# Patient Record
Sex: MALE | Race: WHITE | NOT HISPANIC OR LATINO | Employment: PART TIME | URBAN - METROPOLITAN AREA
[De-identification: names, ages, dates, MRNs, and addresses within clinical notes are randomized per-mention and may not be internally consistent; named-entity substitution may affect disease eponyms.]

---

## 2023-01-01 ENCOUNTER — HOSPITAL ENCOUNTER (INPATIENT)
Facility: HOSPITAL | Age: 63
LOS: 3 days | Discharge: HOME | DRG: 322 | End: 2023-12-08
Attending: EMERGENCY MEDICINE | Admitting: HOSPITALIST

## 2023-01-01 ENCOUNTER — TELEPHONE (OUTPATIENT)
Dept: GASTROENTEROLOGY | Facility: CLINIC | Age: 63
End: 2023-01-01

## 2023-01-01 ENCOUNTER — OFFICE VISIT (OUTPATIENT)
Dept: CARDIOLOGY | Facility: CLINIC | Age: 63
End: 2023-01-01

## 2023-01-01 ENCOUNTER — ANESTHESIA EVENT (OUTPATIENT)
Dept: GASTROENTEROLOGY | Facility: HOSPITAL | Age: 63
DRG: 322 | End: 2023-01-01

## 2023-01-01 ENCOUNTER — APPOINTMENT (OUTPATIENT)
Dept: CARDIOLOGY | Facility: HOSPITAL | Age: 63
DRG: 322 | End: 2023-01-01

## 2023-01-01 ENCOUNTER — ANESTHESIA (OUTPATIENT)
Dept: GASTROENTEROLOGY | Facility: HOSPITAL | Age: 63
End: 2023-01-01

## 2023-01-01 ENCOUNTER — ANESTHESIA EVENT (OUTPATIENT)
Dept: GASTROENTEROLOGY | Facility: HOSPITAL | Age: 63
End: 2023-01-01

## 2023-01-01 ENCOUNTER — APPOINTMENT (OUTPATIENT)
Dept: GASTROENTEROLOGY | Facility: HOSPITAL | Age: 63
DRG: 322 | End: 2023-01-01

## 2023-01-01 ENCOUNTER — APPOINTMENT (OUTPATIENT)
Dept: RADIOLOGY | Facility: HOSPITAL | Age: 63
DRG: 322 | End: 2023-01-01

## 2023-01-01 ENCOUNTER — LAB (OUTPATIENT)
Dept: LAB | Facility: LAB | Age: 63
End: 2023-01-01

## 2023-01-01 ENCOUNTER — ANESTHESIA (OUTPATIENT)
Dept: GASTROENTEROLOGY | Facility: HOSPITAL | Age: 63
DRG: 322 | End: 2023-01-01

## 2023-01-01 VITALS
TEMPERATURE: 98.8 F | DIASTOLIC BLOOD PRESSURE: 68 MMHG | BODY MASS INDEX: 26.48 KG/M2 | HEART RATE: 108 BPM | SYSTOLIC BLOOD PRESSURE: 137 MMHG | WEIGHT: 184.97 LBS | RESPIRATION RATE: 18 BRPM | OXYGEN SATURATION: 94 % | HEIGHT: 70 IN

## 2023-01-01 VITALS
HEART RATE: 68 BPM | BODY MASS INDEX: 26.53 KG/M2 | DIASTOLIC BLOOD PRESSURE: 62 MMHG | WEIGHT: 185.3 LBS | SYSTOLIC BLOOD PRESSURE: 116 MMHG | HEIGHT: 70 IN

## 2023-01-01 DIAGNOSIS — R42 DIZZINESS: ICD-10-CM

## 2023-01-01 DIAGNOSIS — R09.89 CAROTID BRUIT, UNSPECIFIED LATERALITY: ICD-10-CM

## 2023-01-01 DIAGNOSIS — Z95.5 PRESENCE OF STENT IN CORONARY ARTERY: ICD-10-CM

## 2023-01-01 DIAGNOSIS — Z98.61 CAD S/P PERCUTANEOUS CORONARY ANGIOPLASTY: ICD-10-CM

## 2023-01-01 DIAGNOSIS — Z86.73 HISTORY OF CVA (CEREBROVASCULAR ACCIDENT): ICD-10-CM

## 2023-01-01 DIAGNOSIS — F17.200 CURRENT SMOKER: ICD-10-CM

## 2023-01-01 DIAGNOSIS — I10 ESSENTIAL HYPERTENSION: ICD-10-CM

## 2023-01-01 DIAGNOSIS — I21.4 NSTEMI (NON-ST ELEVATED MYOCARDIAL INFARCTION) (MULTI): ICD-10-CM

## 2023-01-01 DIAGNOSIS — I65.29 CAROTID ATHEROSCLEROSIS, UNSPECIFIED LATERALITY: ICD-10-CM

## 2023-01-01 DIAGNOSIS — I25.10 CAD S/P PERCUTANEOUS CORONARY ANGIOPLASTY: ICD-10-CM

## 2023-01-01 DIAGNOSIS — D50.9 IRON DEFICIENCY ANEMIA, UNSPECIFIED IRON DEFICIENCY ANEMIA TYPE: ICD-10-CM

## 2023-01-01 DIAGNOSIS — D64.9 ANEMIA, UNSPECIFIED TYPE: ICD-10-CM

## 2023-01-01 DIAGNOSIS — N18.31 STAGE 3A CHRONIC KIDNEY DISEASE (MULTI): ICD-10-CM

## 2023-01-01 DIAGNOSIS — I10 ESSENTIAL (PRIMARY) HYPERTENSION: ICD-10-CM

## 2023-01-01 DIAGNOSIS — E78.2 MIXED HYPERLIPIDEMIA: ICD-10-CM

## 2023-01-01 DIAGNOSIS — E78.5 DYSLIPIDEMIA: ICD-10-CM

## 2023-01-01 DIAGNOSIS — I21.4 NSTEMI (NON-ST ELEVATED MYOCARDIAL INFARCTION) (MULTI): Primary | ICD-10-CM

## 2023-01-01 DIAGNOSIS — I25.2 OLD MYOCARDIAL INFARCTION: ICD-10-CM

## 2023-01-01 LAB
ALBUMIN SERPL BCP-MCNC: 4.4 G/DL (ref 3.4–5)
ALP SERPL-CCNC: 76 U/L (ref 33–136)
ALT SERPL W P-5'-P-CCNC: 23 U/L (ref 10–52)
ANION GAP SERPL CALC-SCNC: 11 MMOL/L (ref 10–20)
ANION GAP SERPL CALC-SCNC: 12 MMOL/L (ref 10–20)
ANION GAP SERPL CALC-SCNC: 13 MMOL/L (ref 10–20)
ANION GAP SERPL CALC-SCNC: 13 MMOL/L (ref 10–20)
ANION GAP SERPL CALC-SCNC: 8 MMOL/L (ref 10–20)
AORTIC VALVE MEAN GRADIENT: 9
AORTIC VALVE PEAK VELOCITY: 1.97
APTT PPP: 35 SECONDS (ref 27–38)
AST SERPL W P-5'-P-CCNC: 17 U/L (ref 9–39)
ATRIAL RATE: 75 BPM
ATRIAL RATE: 83 BPM
ATRIAL RATE: 88 BPM
AV PEAK GRADIENT: 15.5
AVA (PEAK VEL): 1.9
AVA (VTI): 1.99
BASOPHILS # BLD AUTO: 0.02 X10*3/UL (ref 0–0.1)
BASOPHILS NFR BLD AUTO: 0.3 %
BILIRUB SERPL-MCNC: 0.4 MG/DL (ref 0–1.2)
BNP SERPL-MCNC: 30 PG/ML (ref 0–99)
BUN SERPL-MCNC: 14 MG/DL (ref 6–23)
BUN SERPL-MCNC: 15 MG/DL (ref 6–23)
BUN SERPL-MCNC: 15 MG/DL (ref 6–23)
BUN SERPL-MCNC: 18 MG/DL (ref 6–23)
BUN SERPL-MCNC: 20 MG/DL (ref 6–23)
CALCIUM SERPL-MCNC: 8.9 MG/DL (ref 8.6–10.3)
CALCIUM SERPL-MCNC: 9 MG/DL (ref 8.6–10.3)
CALCIUM SERPL-MCNC: 9 MG/DL (ref 8.6–10.3)
CALCIUM SERPL-MCNC: 9.2 MG/DL (ref 8.6–10.3)
CALCIUM SERPL-MCNC: 9.4 MG/DL (ref 8.6–10.3)
CARDIAC TROPONIN I PNL SERPL HS: 223 NG/L (ref 0–20)
CARDIAC TROPONIN I PNL SERPL HS: 273 NG/L (ref 0–20)
CARDIAC TROPONIN I PNL SERPL HS: 477 NG/L (ref 0–20)
CHLORIDE SERPL-SCNC: 107 MMOL/L (ref 98–107)
CHLORIDE SERPL-SCNC: 108 MMOL/L (ref 98–107)
CHLORIDE SERPL-SCNC: 110 MMOL/L (ref 98–107)
CHOLEST SERPL-MCNC: 114 MG/DL (ref 0–199)
CHOLESTEROL/HDL RATIO: 3.7
CO2 SERPL-SCNC: 21 MMOL/L (ref 21–32)
CO2 SERPL-SCNC: 21 MMOL/L (ref 21–32)
CO2 SERPL-SCNC: 22 MMOL/L (ref 21–32)
CO2 SERPL-SCNC: 22 MMOL/L (ref 21–32)
CO2 SERPL-SCNC: 24 MMOL/L (ref 21–32)
CREAT SERPL-MCNC: 1.18 MG/DL (ref 0.5–1.3)
CREAT SERPL-MCNC: 1.27 MG/DL (ref 0.5–1.3)
CREAT SERPL-MCNC: 1.32 MG/DL (ref 0.5–1.3)
CREAT SERPL-MCNC: 1.39 MG/DL (ref 0.5–1.3)
CREAT SERPL-MCNC: 1.4 MG/DL (ref 0.5–1.3)
D DIMER PPP FEU-MCNC: 442 NG/ML FEU
EJECTION FRACTION APICAL 4 CHAMBER: 64.7
EJECTION FRACTION: 63
EOSINOPHIL # BLD AUTO: 0 X10*3/UL (ref 0–0.7)
EOSINOPHIL NFR BLD AUTO: 0 %
ERYTHROCYTE [DISTWIDTH] IN BLOOD BY AUTOMATED COUNT: 16.3 % (ref 11.5–14.5)
ERYTHROCYTE [DISTWIDTH] IN BLOOD BY AUTOMATED COUNT: 16.4 % (ref 11.5–14.5)
ERYTHROCYTE [DISTWIDTH] IN BLOOD BY AUTOMATED COUNT: 16.5 % (ref 11.5–14.5)
ERYTHROCYTE [DISTWIDTH] IN BLOOD BY AUTOMATED COUNT: 22.5 % (ref 11.5–14.5)
FERRITIN SERPL-MCNC: 11 NG/ML (ref 20–300)
FOLATE SERPL-MCNC: >22.3 NG/ML
GFR SERPL CREATININE-BSD FRML MDRD: 57 ML/MIN/1.73M*2
GFR SERPL CREATININE-BSD FRML MDRD: 57 ML/MIN/1.73M*2
GFR SERPL CREATININE-BSD FRML MDRD: 61 ML/MIN/1.73M*2
GFR SERPL CREATININE-BSD FRML MDRD: 64 ML/MIN/1.73M*2
GFR SERPL CREATININE-BSD FRML MDRD: 70 ML/MIN/1.73M*2
GLUCOSE SERPL-MCNC: 116 MG/DL (ref 74–99)
GLUCOSE SERPL-MCNC: 129 MG/DL (ref 74–99)
GLUCOSE SERPL-MCNC: 83 MG/DL (ref 74–99)
GLUCOSE SERPL-MCNC: 92 MG/DL (ref 74–99)
GLUCOSE SERPL-MCNC: 94 MG/DL (ref 74–99)
HCT VFR BLD AUTO: 27.6 % (ref 41–52)
HCT VFR BLD AUTO: 30.6 % (ref 41–52)
HCT VFR BLD AUTO: 32.3 % (ref 41–52)
HCT VFR BLD AUTO: 32.6 % (ref 41–52)
HCT VFR BLD AUTO: 33.8 % (ref 41–52)
HCT VFR BLD AUTO: 44.5 % (ref 41–52)
HDLC SERPL-MCNC: 31 MG/DL
HGB BLD-MCNC: 13 G/DL (ref 13.5–17.5)
HGB BLD-MCNC: 8.2 G/DL (ref 13.5–17.5)
HGB BLD-MCNC: 9.1 G/DL (ref 13.5–17.5)
HGB BLD-MCNC: 9.5 G/DL (ref 13.5–17.5)
HGB BLD-MCNC: 9.6 G/DL (ref 13.5–17.5)
HGB BLD-MCNC: 9.8 G/DL (ref 13.5–17.5)
HOLD SPECIMEN: NORMAL
IMM GRANULOCYTES # BLD AUTO: 0.02 X10*3/UL (ref 0–0.7)
IMM GRANULOCYTES NFR BLD AUTO: 0.3 % (ref 0–0.9)
INR PPP: 1.1 (ref 0.9–1.1)
IRON SATN MFR SERPL: 4 % (ref 25–45)
IRON SERPL-MCNC: 14 UG/DL (ref 35–150)
LABORATORY COMMENT REPORT: NORMAL
LDLC SERPL CALC-MCNC: 59 MG/DL
LEFT ATRIUM VOLUME AREA LENGTH INDEX BSA: 30
LEFT VENTRICLE INTERNAL DIMENSION DIASTOLE: 4.79 (ref 3.5–6)
LEFT VENTRICULAR OUTFLOW TRACT DIAMETER: 2
LYMPHOCYTES # BLD AUTO: 0.99 X10*3/UL (ref 1.2–4.8)
LYMPHOCYTES NFR BLD AUTO: 13 %
MAGNESIUM SERPL-MCNC: 1.84 MG/DL (ref 1.6–2.4)
MAGNESIUM SERPL-MCNC: 2.01 MG/DL (ref 1.6–2.4)
MAGNESIUM SERPL-MCNC: 2.03 MG/DL (ref 1.6–2.4)
MAGNESIUM SERPL-MCNC: 2.05 MG/DL (ref 1.6–2.4)
MCH RBC QN AUTO: 22.6 PG (ref 26–34)
MCH RBC QN AUTO: 22.8 PG (ref 26–34)
MCH RBC QN AUTO: 22.9 PG (ref 26–34)
MCH RBC QN AUTO: 23 PG (ref 26–34)
MCH RBC QN AUTO: 23 PG (ref 26–34)
MCH RBC QN AUTO: 24.6 PG (ref 26–34)
MCHC RBC AUTO-ENTMCNC: 29 G/DL (ref 32–36)
MCHC RBC AUTO-ENTMCNC: 29.1 G/DL (ref 32–36)
MCHC RBC AUTO-ENTMCNC: 29.2 G/DL (ref 32–36)
MCHC RBC AUTO-ENTMCNC: 29.7 G/DL (ref 32–36)
MCV RBC AUTO: 77 FL (ref 80–100)
MCV RBC AUTO: 78 FL (ref 80–100)
MCV RBC AUTO: 84 FL (ref 80–100)
MITRAL VALVE E/A RATIO: 1.07
MITRAL VALVE E/E' RATIO: 6.93
MONOCYTES # BLD AUTO: 0.55 X10*3/UL (ref 0.1–1)
MONOCYTES NFR BLD AUTO: 7.2 %
NEUTROPHILS # BLD AUTO: 6.02 X10*3/UL (ref 1.2–7.7)
NEUTROPHILS NFR BLD AUTO: 79.2 %
NON HDL CHOLESTEROL: 83 MG/DL (ref 0–149)
NRBC BLD-RTO: 0 /100 WBCS (ref 0–0)
P AXIS: 49 DEGREES
P AXIS: 53 DEGREES
P AXIS: 68 DEGREES
P OFFSET: 188 MS
P OFFSET: 194 MS
P OFFSET: 199 MS
P ONSET: 143 MS
P ONSET: 149 MS
P ONSET: 152 MS
PATH REPORT.FINAL DX SPEC: NORMAL
PATH REPORT.GROSS SPEC: NORMAL
PATH REPORT.TOTAL CANCER: NORMAL
PLATELET # BLD AUTO: 178 X10*3/UL (ref 150–450)
PLATELET # BLD AUTO: 203 X10*3/UL (ref 150–450)
PLATELET # BLD AUTO: 204 X10*3/UL (ref 150–450)
PLATELET # BLD AUTO: 207 X10*3/UL (ref 150–450)
PLATELET # BLD AUTO: 209 X10*3/UL (ref 150–450)
PLATELET # BLD AUTO: 230 X10*3/UL (ref 150–450)
POTASSIUM SERPL-SCNC: 3.6 MMOL/L (ref 3.5–5.3)
POTASSIUM SERPL-SCNC: 3.9 MMOL/L (ref 3.5–5.3)
POTASSIUM SERPL-SCNC: 3.9 MMOL/L (ref 3.5–5.3)
POTASSIUM SERPL-SCNC: 4 MMOL/L (ref 3.5–5.3)
POTASSIUM SERPL-SCNC: 4 MMOL/L (ref 3.5–5.3)
PR INTERVAL: 148 MS
PR INTERVAL: 158 MS
PR INTERVAL: 166 MS
PROT SERPL-MCNC: 6.8 G/DL (ref 6.4–8.2)
PROTHROMBIN TIME: 12.2 SECONDS (ref 9.8–12.8)
Q ONSET: 226 MS
Q ONSET: 226 MS
Q ONSET: 228 MS
QRS COUNT: 12 BEATS
QRS COUNT: 14 BEATS
QRS COUNT: 15 BEATS
QRS DURATION: 130 MS
QRS DURATION: 134 MS
QRS DURATION: 138 MS
QT INTERVAL: 400 MS
QT INTERVAL: 402 MS
QT INTERVAL: 426 MS
QTC CALCULATION(BAZETT): 470 MS
QTC CALCULATION(BAZETT): 475 MS
QTC CALCULATION(BAZETT): 486 MS
QTC FREDERICIA: 446 MS
QTC FREDERICIA: 456 MS
QTC FREDERICIA: 458 MS
R AXIS: -18 DEGREES
R AXIS: -43 DEGREES
R AXIS: -52 DEGREES
RBC # BLD AUTO: 3.56 X10*6/UL (ref 4.5–5.9)
RBC # BLD AUTO: 3.98 X10*6/UL (ref 4.5–5.9)
RBC # BLD AUTO: 4.17 X10*6/UL (ref 4.5–5.9)
RBC # BLD AUTO: 4.17 X10*6/UL (ref 4.5–5.9)
RBC # BLD AUTO: 4.33 X10*6/UL (ref 4.5–5.9)
RBC # BLD AUTO: 5.28 X10*6/UL (ref 4.5–5.9)
RIGHT VENTRICLE FREE WALL PEAK S': 14.3
RIGHT VENTRICLE PEAK SYSTOLIC PRESSURE: 34.4
SODIUM SERPL-SCNC: 136 MMOL/L (ref 136–145)
SODIUM SERPL-SCNC: 137 MMOL/L (ref 136–145)
SODIUM SERPL-SCNC: 138 MMOL/L (ref 136–145)
SODIUM SERPL-SCNC: 138 MMOL/L (ref 136–145)
SODIUM SERPL-SCNC: 139 MMOL/L (ref 136–145)
T AXIS: -27 DEGREES
T AXIS: -47 DEGREES
T AXIS: 1 DEGREES
T OFFSET: 426 MS
T OFFSET: 427 MS
T OFFSET: 441 MS
TIBC SERPL-MCNC: 340 UG/DL (ref 240–445)
TRICUSPID ANNULAR PLANE SYSTOLIC EXCURSION: 2.3
TRIGL SERPL-MCNC: 122 MG/DL (ref 0–149)
UFH PPP CHRO-ACNC: 0.1 IU/ML
UFH PPP CHRO-ACNC: 0.2 IU/ML
UFH PPP CHRO-ACNC: 0.3 IU/ML
UFH PPP CHRO-ACNC: 0.6 IU/ML
UIBC SERPL-MCNC: 326 UG/DL (ref 110–370)
VENTRICULAR RATE: 75 BPM
VENTRICULAR RATE: 83 BPM
VENTRICULAR RATE: 88 BPM
VIT B12 SERPL-MCNC: 637 PG/ML (ref 211–911)
VLDL: 24 MG/DL (ref 0–40)
WBC # BLD AUTO: 6 X10*3/UL (ref 4.4–11.3)
WBC # BLD AUTO: 6.4 X10*3/UL (ref 4.4–11.3)
WBC # BLD AUTO: 6.6 X10*3/UL (ref 4.4–11.3)
WBC # BLD AUTO: 7.6 X10*3/UL (ref 4.4–11.3)
WBC # BLD AUTO: 7.6 X10*3/UL (ref 4.4–11.3)
WBC # BLD AUTO: 7.7 X10*3/UL (ref 4.4–11.3)

## 2023-01-01 PROCEDURE — 0DBC8ZX EXCISION OF ILEOCECAL VALVE, VIA NATURAL OR ARTIFICIAL OPENING ENDOSCOPIC, DIAGNOSTIC: ICD-10-PCS | Performed by: INTERNAL MEDICINE

## 2023-01-01 PROCEDURE — 80048 BASIC METABOLIC PNL TOTAL CA: CPT | Performed by: NURSE PRACTITIONER

## 2023-01-01 PROCEDURE — 36415 COLL VENOUS BLD VENIPUNCTURE: CPT | Performed by: HOSPITALIST

## 2023-01-01 PROCEDURE — 80061 LIPID PANEL: CPT | Performed by: NURSE PRACTITIONER

## 2023-01-01 PROCEDURE — S4991 NICOTINE PATCH NONLEGEND: HCPCS | Performed by: INTERNAL MEDICINE

## 2023-01-01 PROCEDURE — 2500000002 HC RX 250 W HCPCS SELF ADMINISTERED DRUGS (ALT 637 FOR MEDICARE OP, ALT 636 FOR OP/ED): Performed by: INTERNAL MEDICINE

## 2023-01-01 PROCEDURE — 99222 1ST HOSP IP/OBS MODERATE 55: CPT | Performed by: NURSE PRACTITIONER

## 2023-01-01 PROCEDURE — 96366 THER/PROPH/DIAG IV INF ADDON: CPT

## 2023-01-01 PROCEDURE — C1760 CLOSURE DEV, VASC: HCPCS | Performed by: INTERNAL MEDICINE

## 2023-01-01 PROCEDURE — 93010 ELECTROCARDIOGRAM REPORT: CPT | Performed by: INTERNAL MEDICINE

## 2023-01-01 PROCEDURE — 2500000005 HC RX 250 GENERAL PHARMACY W/O HCPCS: Performed by: INTERNAL MEDICINE

## 2023-01-01 PROCEDURE — 2500000004 HC RX 250 GENERAL PHARMACY W/ HCPCS (ALT 636 FOR OP/ED): Performed by: INTERNAL MEDICINE

## 2023-01-01 PROCEDURE — 99152 MOD SED SAME PHYS/QHP 5/>YRS: CPT | Performed by: INTERNAL MEDICINE

## 2023-01-01 PROCEDURE — 82728 ASSAY OF FERRITIN: CPT | Mod: ELYLAB | Performed by: NURSE PRACTITIONER

## 2023-01-01 PROCEDURE — 3078F DIAST BP <80 MM HG: CPT | Performed by: INTERNAL MEDICINE

## 2023-01-01 PROCEDURE — 3074F SYST BP LT 130 MM HG: CPT | Performed by: INTERNAL MEDICINE

## 2023-01-01 PROCEDURE — 85520 HEPARIN ASSAY: CPT | Performed by: INTERNAL MEDICINE

## 2023-01-01 PROCEDURE — 85027 COMPLETE CBC AUTOMATED: CPT | Performed by: NURSE PRACTITIONER

## 2023-01-01 PROCEDURE — 4A023N7 MEASUREMENT OF CARDIAC SAMPLING AND PRESSURE, LEFT HEART, PERCUTANEOUS APPROACH: ICD-10-PCS | Performed by: INTERNAL MEDICINE

## 2023-01-01 PROCEDURE — 2550000001 HC RX 255 CONTRASTS: Performed by: INTERNAL MEDICINE

## 2023-01-01 PROCEDURE — 85347 COAGULATION TIME ACTIVATED: CPT | Performed by: INTERNAL MEDICINE

## 2023-01-01 PROCEDURE — 36415 COLL VENOUS BLD VENIPUNCTURE: CPT

## 2023-01-01 PROCEDURE — 99232 SBSQ HOSP IP/OBS MODERATE 35: CPT | Performed by: HOSPITALIST

## 2023-01-01 PROCEDURE — 93005 ELECTROCARDIOGRAM TRACING: CPT

## 2023-01-01 PROCEDURE — 85027 COMPLETE CBC AUTOMATED: CPT | Performed by: HOSPITALIST

## 2023-01-01 PROCEDURE — 2500000004 HC RX 250 GENERAL PHARMACY W/ HCPCS (ALT 636 FOR OP/ED): Performed by: HOSPITALIST

## 2023-01-01 PROCEDURE — 0DBP8ZX EXCISION OF RECTUM, VIA NATURAL OR ARTIFICIAL OPENING ENDOSCOPIC, DIAGNOSTIC: ICD-10-PCS | Performed by: INTERNAL MEDICINE

## 2023-01-01 PROCEDURE — 82374 ASSAY BLOOD CARBON DIOXIDE: CPT | Performed by: HOSPITALIST

## 2023-01-01 PROCEDURE — B2111ZZ FLUOROSCOPY OF MULTIPLE CORONARY ARTERIES USING LOW OSMOLAR CONTRAST: ICD-10-PCS | Performed by: INTERNAL MEDICINE

## 2023-01-01 PROCEDURE — 0DB78ZX EXCISION OF STOMACH, PYLORUS, VIA NATURAL OR ARTIFICIAL OPENING ENDOSCOPIC, DIAGNOSTIC: ICD-10-PCS | Performed by: INTERNAL MEDICINE

## 2023-01-01 PROCEDURE — 80048 BASIC METABOLIC PNL TOTAL CA: CPT | Performed by: HOSPITALIST

## 2023-01-01 PROCEDURE — 83735 ASSAY OF MAGNESIUM: CPT | Performed by: HOSPITALIST

## 2023-01-01 PROCEDURE — 99291 CRITICAL CARE FIRST HOUR: CPT | Mod: 25 | Performed by: EMERGENCY MEDICINE

## 2023-01-01 PROCEDURE — 99223 1ST HOSP IP/OBS HIGH 75: CPT | Performed by: INTERNAL MEDICINE

## 2023-01-01 PROCEDURE — B2151ZZ FLUOROSCOPY OF LEFT HEART USING LOW OSMOLAR CONTRAST: ICD-10-PCS | Performed by: INTERNAL MEDICINE

## 2023-01-01 PROCEDURE — 85610 PROTHROMBIN TIME: CPT | Performed by: EMERGENCY MEDICINE

## 2023-01-01 PROCEDURE — 2720000007 HC OR 272 NO HCPCS: Performed by: INTERNAL MEDICINE

## 2023-01-01 PROCEDURE — 83735 ASSAY OF MAGNESIUM: CPT | Performed by: EMERGENCY MEDICINE

## 2023-01-01 PROCEDURE — 2500000001 HC RX 250 WO HCPCS SELF ADMINISTERED DRUGS (ALT 637 FOR MEDICARE OP): Performed by: INTERNAL MEDICINE

## 2023-01-01 PROCEDURE — 94760 N-INVAS EAR/PLS OXIMETRY 1: CPT

## 2023-01-01 PROCEDURE — 3700000002 HC GENERAL ANESTHESIA TIME - EACH INCREMENTAL 1 MINUTE

## 2023-01-01 PROCEDURE — 99233 SBSQ HOSP IP/OBS HIGH 50: CPT | Performed by: INTERNAL MEDICINE

## 2023-01-01 PROCEDURE — 71045 X-RAY EXAM CHEST 1 VIEW: CPT | Mod: FY

## 2023-01-01 PROCEDURE — 84484 ASSAY OF TROPONIN QUANT: CPT | Performed by: EMERGENCY MEDICINE

## 2023-01-01 PROCEDURE — C1874 STENT, COATED/COV W/DEL SYS: HCPCS | Performed by: INTERNAL MEDICINE

## 2023-01-01 PROCEDURE — 2780000003 HC OR 278 NO HCPCS: Performed by: INTERNAL MEDICINE

## 2023-01-01 PROCEDURE — 45380 COLONOSCOPY AND BIOPSY: CPT | Performed by: INTERNAL MEDICINE

## 2023-01-01 PROCEDURE — 85025 COMPLETE CBC W/AUTO DIFF WBC: CPT | Performed by: EMERGENCY MEDICINE

## 2023-01-01 PROCEDURE — 82607 VITAMIN B-12: CPT | Performed by: NURSE PRACTITIONER

## 2023-01-01 PROCEDURE — 85520 HEPARIN ASSAY: CPT | Performed by: EMERGENCY MEDICINE

## 2023-01-01 PROCEDURE — 027034Z DILATION OF CORONARY ARTERY, ONE ARTERY WITH DRUG-ELUTING INTRALUMINAL DEVICE, PERCUTANEOUS APPROACH: ICD-10-PCS | Performed by: INTERNAL MEDICINE

## 2023-01-01 PROCEDURE — 0DB48ZX EXCISION OF ESOPHAGOGASTRIC JUNCTION, VIA NATURAL OR ARTIFICIAL OPENING ENDOSCOPIC, DIAGNOSTIC: ICD-10-PCS | Performed by: INTERNAL MEDICINE

## 2023-01-01 PROCEDURE — 1200000002 HC GENERAL ROOM WITH TELEMETRY DAILY

## 2023-01-01 PROCEDURE — 99153 MOD SED SAME PHYS/QHP EA: CPT | Performed by: INTERNAL MEDICINE

## 2023-01-01 PROCEDURE — 3700000001 HC GENERAL ANESTHESIA TIME - INITIAL BASE CHARGE

## 2023-01-01 PROCEDURE — 93306 TTE W/DOPPLER COMPLETE: CPT

## 2023-01-01 PROCEDURE — 83880 ASSAY OF NATRIURETIC PEPTIDE: CPT | Performed by: EMERGENCY MEDICINE

## 2023-01-01 PROCEDURE — 36415 COLL VENOUS BLD VENIPUNCTURE: CPT | Performed by: EMERGENCY MEDICINE

## 2023-01-01 PROCEDURE — 96375 TX/PRO/DX INJ NEW DRUG ADDON: CPT

## 2023-01-01 PROCEDURE — 2500000001 HC RX 250 WO HCPCS SELF ADMINISTERED DRUGS (ALT 637 FOR MEDICARE OP): Performed by: NURSE PRACTITIONER

## 2023-01-01 PROCEDURE — 36415 COLL VENOUS BLD VENIPUNCTURE: CPT | Performed by: NURSE PRACTITIONER

## 2023-01-01 PROCEDURE — 2500000001 HC RX 250 WO HCPCS SELF ADMINISTERED DRUGS (ALT 637 FOR MEDICARE OP): Performed by: EMERGENCY MEDICINE

## 2023-01-01 PROCEDURE — 93458 L HRT ARTERY/VENTRICLE ANGIO: CPT | Performed by: INTERNAL MEDICINE

## 2023-01-01 PROCEDURE — C1769 GUIDE WIRE: HCPCS | Performed by: INTERNAL MEDICINE

## 2023-01-01 PROCEDURE — C1725 CATH, TRANSLUMIN NON-LASER: HCPCS | Performed by: INTERNAL MEDICINE

## 2023-01-01 PROCEDURE — 36415 COLL VENOUS BLD VENIPUNCTURE: CPT | Performed by: INTERNAL MEDICINE

## 2023-01-01 PROCEDURE — 71045 X-RAY EXAM CHEST 1 VIEW: CPT | Performed by: RADIOLOGY

## 2023-01-01 PROCEDURE — 3008F BODY MASS INDEX DOCD: CPT | Performed by: INTERNAL MEDICINE

## 2023-01-01 PROCEDURE — 93880 EXTRACRANIAL BILAT STUDY: CPT | Performed by: RADIOLOGY

## 2023-01-01 PROCEDURE — 2500000004 HC RX 250 GENERAL PHARMACY W/ HCPCS (ALT 636 FOR OP/ED): Performed by: EMERGENCY MEDICINE

## 2023-01-01 PROCEDURE — 43239 EGD BIOPSY SINGLE/MULTIPLE: CPT | Performed by: INTERNAL MEDICINE

## 2023-01-01 PROCEDURE — 96365 THER/PROPH/DIAG IV INF INIT: CPT

## 2023-01-01 PROCEDURE — 80053 COMPREHEN METABOLIC PANEL: CPT | Performed by: EMERGENCY MEDICINE

## 2023-01-01 PROCEDURE — 88305 TISSUE EXAM BY PATHOLOGIST: CPT | Performed by: PATHOLOGY

## 2023-01-01 PROCEDURE — 85730 THROMBOPLASTIN TIME PARTIAL: CPT | Performed by: EMERGENCY MEDICINE

## 2023-01-01 PROCEDURE — 2500000004 HC RX 250 GENERAL PHARMACY W/ HCPCS (ALT 636 FOR OP/ED): Performed by: NURSE ANESTHETIST, CERTIFIED REGISTERED

## 2023-01-01 PROCEDURE — G0269 OCCLUSIVE DEVICE IN VEIN ART: HCPCS | Mod: TC | Performed by: INTERNAL MEDICINE

## 2023-01-01 PROCEDURE — 88305 TISSUE EXAM BY PATHOLOGIST: CPT | Mod: TC,SUR,ELYLAB | Performed by: INTERNAL MEDICINE

## 2023-01-01 PROCEDURE — 85379 FIBRIN DEGRADATION QUANT: CPT | Performed by: EMERGENCY MEDICINE

## 2023-01-01 PROCEDURE — 99214 OFFICE O/P EST MOD 30 MIN: CPT | Performed by: INTERNAL MEDICINE

## 2023-01-01 PROCEDURE — 84484 ASSAY OF TROPONIN QUANT: CPT | Performed by: INTERNAL MEDICINE

## 2023-01-01 PROCEDURE — 7100000010 HC PHASE TWO TIME - EACH INCREMENTAL 1 MINUTE: Performed by: INTERNAL MEDICINE

## 2023-01-01 PROCEDURE — 7100000009 HC PHASE TWO TIME - INITIAL BASE CHARGE: Performed by: INTERNAL MEDICINE

## 2023-01-01 PROCEDURE — C9601 PERC DRUG-EL COR STENT BRAN: HCPCS | Performed by: INTERNAL MEDICINE

## 2023-01-01 PROCEDURE — 85027 COMPLETE CBC AUTOMATED: CPT

## 2023-01-01 PROCEDURE — 92928 PRQ TCAT PLMT NTRAC ST 1 LES: CPT | Performed by: INTERNAL MEDICINE

## 2023-01-01 PROCEDURE — 85520 HEPARIN ASSAY: CPT | Performed by: HOSPITALIST

## 2023-01-01 PROCEDURE — 96376 TX/PRO/DX INJ SAME DRUG ADON: CPT

## 2023-01-01 PROCEDURE — G0378 HOSPITAL OBSERVATION PER HR: HCPCS

## 2023-01-01 PROCEDURE — 93306 TTE W/DOPPLER COMPLETE: CPT | Performed by: INTERNAL MEDICINE

## 2023-01-01 PROCEDURE — 99239 HOSP IP/OBS DSCHRG MGMT >30: CPT | Performed by: HOSPITALIST

## 2023-01-01 PROCEDURE — 82746 ASSAY OF FOLIC ACID SERUM: CPT | Performed by: NURSE PRACTITIONER

## 2023-01-01 PROCEDURE — C1887 CATHETER, GUIDING: HCPCS | Performed by: INTERNAL MEDICINE

## 2023-01-01 PROCEDURE — 83540 ASSAY OF IRON: CPT | Performed by: NURSE PRACTITIONER

## 2023-01-01 PROCEDURE — 93880 EXTRACRANIAL BILAT STUDY: CPT

## 2023-01-01 DEVICE — STENT ONYXNG35022UX ONYX 3.50X22RX
Type: IMPLANTABLE DEVICE | Status: FUNCTIONAL
Brand: ONYX FRONTIER™

## 2023-01-01 RX ORDER — LABETALOL HYDROCHLORIDE 5 MG/ML
20 INJECTION, SOLUTION INTRAVENOUS ONCE
Status: COMPLETED | OUTPATIENT
Start: 2023-01-01 | End: 2023-01-01

## 2023-01-01 RX ORDER — HEPARIN SODIUM 10000 [USP'U]/100ML
0-4000 INJECTION, SOLUTION INTRAVENOUS CONTINUOUS
Status: DISCONTINUED | OUTPATIENT
Start: 2023-01-01 | End: 2023-01-01

## 2023-01-01 RX ORDER — AMLODIPINE BESYLATE 10 MG/1
TABLET ORAL
Qty: 90 TABLET | Refills: 3 | Status: ON HOLD | OUTPATIENT
Start: 2023-01-01 | End: 2023-01-01 | Stop reason: SDUPTHER

## 2023-01-01 RX ORDER — ASPIRIN 81 MG/1
81 TABLET ORAL DAILY
Status: DISCONTINUED | OUTPATIENT
Start: 2023-01-01 | End: 2023-01-01 | Stop reason: HOSPADM

## 2023-01-01 RX ORDER — CLOPIDOGREL BISULFATE 75 MG/1
75 TABLET ORAL DAILY
Status: DISCONTINUED | OUTPATIENT
Start: 2023-01-01 | End: 2023-01-01 | Stop reason: HOSPADM

## 2023-01-01 RX ORDER — PROPOFOL 10 MG/ML
INJECTION, EMULSION INTRAVENOUS AS NEEDED
Status: DISCONTINUED | OUTPATIENT
Start: 2023-01-01 | End: 2023-01-01

## 2023-01-01 RX ORDER — POLYETHYLENE GLYCOL 3350, SODIUM CHLORIDE, SODIUM BICARBONATE, POTASSIUM CHLORIDE 420; 11.2; 5.72; 1.48 G/4L; G/4L; G/4L; G/4L
4000 POWDER, FOR SOLUTION ORAL ONCE
Status: COMPLETED | OUTPATIENT
Start: 2023-01-01 | End: 2023-01-01

## 2023-01-01 RX ORDER — MULTIVITAMIN
1 TABLET ORAL DAILY
COMMUNITY
Start: 2022-01-25

## 2023-01-01 RX ORDER — FENTANYL CITRATE 50 UG/ML
INJECTION, SOLUTION INTRAMUSCULAR; INTRAVENOUS AS NEEDED
Status: DISCONTINUED | OUTPATIENT
Start: 2023-01-01 | End: 2023-01-01 | Stop reason: HOSPADM

## 2023-01-01 RX ORDER — ATORVASTATIN CALCIUM 40 MG/1
40 TABLET, FILM COATED ORAL DAILY
COMMUNITY
End: 2024-01-01

## 2023-01-01 RX ORDER — NAPROXEN SODIUM 220 MG/1
324 TABLET, FILM COATED ORAL ONCE
Status: COMPLETED | OUTPATIENT
Start: 2023-01-01 | End: 2023-01-01

## 2023-01-01 RX ORDER — MIDAZOLAM HYDROCHLORIDE 1 MG/ML
INJECTION INTRAMUSCULAR; INTRAVENOUS AS NEEDED
Status: DISCONTINUED | OUTPATIENT
Start: 2023-01-01 | End: 2023-01-01 | Stop reason: HOSPADM

## 2023-01-01 RX ORDER — CLOPIDOGREL BISULFATE 75 MG/1
75 TABLET ORAL DAILY
Qty: 30 TABLET | Refills: 11 | Status: SHIPPED | OUTPATIENT
Start: 2023-01-01 | End: 2024-01-01 | Stop reason: SDUPTHER

## 2023-01-01 RX ORDER — ACETAMINOPHEN 325 MG/1
650 TABLET ORAL EVERY 4 HOURS PRN
Status: DISCONTINUED | OUTPATIENT
Start: 2023-01-01 | End: 2023-01-01 | Stop reason: HOSPADM

## 2023-01-01 RX ORDER — ACETAMINOPHEN 160 MG/5ML
650 SOLUTION ORAL EVERY 4 HOURS PRN
Status: DISCONTINUED | OUTPATIENT
Start: 2023-01-01 | End: 2023-01-01 | Stop reason: HOSPADM

## 2023-01-01 RX ORDER — CLOPIDOGREL BISULFATE 300 MG/1
TABLET, FILM COATED ORAL AS NEEDED
Status: DISCONTINUED | OUTPATIENT
Start: 2023-01-01 | End: 2023-01-01 | Stop reason: HOSPADM

## 2023-01-01 RX ORDER — HEPARIN SODIUM 1000 [USP'U]/ML
INJECTION, SOLUTION INTRAVENOUS; SUBCUTANEOUS AS NEEDED
Status: DISCONTINUED | OUTPATIENT
Start: 2023-01-01 | End: 2023-01-01 | Stop reason: HOSPADM

## 2023-01-01 RX ORDER — LIDOCAINE HYDROCHLORIDE 20 MG/ML
INJECTION, SOLUTION INFILTRATION; PERINEURAL AS NEEDED
Status: DISCONTINUED | OUTPATIENT
Start: 2023-01-01 | End: 2023-01-01 | Stop reason: HOSPADM

## 2023-01-01 RX ORDER — NITROGLYCERIN 0.4 MG/1
0.4 TABLET SUBLINGUAL EVERY 5 MIN PRN
Qty: 25 TABLET | Refills: 5 | Status: SHIPPED | OUTPATIENT
Start: 2023-01-01 | End: 2024-01-01

## 2023-01-01 RX ORDER — AMLODIPINE BESYLATE 10 MG/1
5 TABLET ORAL DAILY
Qty: 30 TABLET | Refills: 5 | Status: SHIPPED | OUTPATIENT
Start: 2023-01-01 | End: 2024-01-01

## 2023-01-01 RX ORDER — CLOPIDOGREL BISULFATE 75 MG/1
75 TABLET ORAL DAILY
Status: ON HOLD | COMMUNITY
End: 2023-01-01 | Stop reason: SDUPTHER

## 2023-01-01 RX ORDER — AMLODIPINE BESYLATE 10 MG/1
1 TABLET ORAL DAILY
Status: ON HOLD | COMMUNITY
Start: 2023-01-01 | End: 2023-01-01 | Stop reason: WASHOUT

## 2023-01-01 RX ORDER — LOSARTAN POTASSIUM 50 MG/1
50 TABLET ORAL DAILY
COMMUNITY
Start: 2019-01-14 | End: 2024-01-01 | Stop reason: ALTCHOICE

## 2023-01-01 RX ORDER — ACETAMINOPHEN 650 MG/1
650 SUPPOSITORY RECTAL EVERY 4 HOURS PRN
Status: DISCONTINUED | OUTPATIENT
Start: 2023-01-01 | End: 2023-01-01 | Stop reason: HOSPADM

## 2023-01-01 RX ORDER — ATORVASTATIN CALCIUM 20 MG/1
40 TABLET, FILM COATED ORAL DAILY
Status: DISCONTINUED | OUTPATIENT
Start: 2023-01-01 | End: 2023-01-01 | Stop reason: HOSPADM

## 2023-01-01 RX ORDER — HEPARIN SODIUM 5000 [USP'U]/ML
2000-4000 INJECTION, SOLUTION INTRAVENOUS; SUBCUTANEOUS EVERY 4 HOURS PRN
Status: DISCONTINUED | OUTPATIENT
Start: 2023-01-01 | End: 2023-01-01

## 2023-01-01 RX ORDER — NAPROXEN SODIUM 220 MG/1
TABLET, FILM COATED ORAL AS NEEDED
Status: DISCONTINUED | OUTPATIENT
Start: 2023-01-01 | End: 2023-01-01 | Stop reason: HOSPADM

## 2023-01-01 RX ORDER — SODIUM CHLORIDE 9 MG/ML
100 INJECTION, SOLUTION INTRAVENOUS CONTINUOUS
Status: ACTIVE | OUTPATIENT
Start: 2023-01-01 | End: 2023-01-01

## 2023-01-01 RX ORDER — ASPIRIN 81 MG/1
81 TABLET ORAL DAILY
Status: DISCONTINUED | OUTPATIENT
Start: 2023-01-01 | End: 2023-01-01

## 2023-01-01 RX ORDER — NITROGLYCERIN 0.4 MG/1
0.4 TABLET SUBLINGUAL EVERY 5 MIN PRN
Status: DISCONTINUED | OUTPATIENT
Start: 2023-01-01 | End: 2023-01-01 | Stop reason: HOSPADM

## 2023-01-01 RX ORDER — ASPIRIN 81 MG/1
1 TABLET ORAL DAILY
COMMUNITY
Start: 2022-01-25

## 2023-01-01 RX ORDER — SODIUM CHLORIDE, SODIUM LACTATE, POTASSIUM CHLORIDE, CALCIUM CHLORIDE 600; 310; 30; 20 MG/100ML; MG/100ML; MG/100ML; MG/100ML
INJECTION, SOLUTION INTRAVENOUS CONTINUOUS PRN
Status: DISCONTINUED | OUTPATIENT
Start: 2023-01-01 | End: 2023-01-01

## 2023-01-01 RX ORDER — HEPARIN SODIUM 5000 [USP'U]/ML
4000 INJECTION, SOLUTION INTRAVENOUS; SUBCUTANEOUS ONCE
Status: COMPLETED | OUTPATIENT
Start: 2023-01-01 | End: 2023-01-01

## 2023-01-01 RX ORDER — POLYETHYLENE GLYCOL 3350 17 G/17G
238 POWDER, FOR SOLUTION ORAL ONCE AS NEEDED
Status: DISCONTINUED | OUTPATIENT
Start: 2023-01-01 | End: 2023-01-01 | Stop reason: HOSPADM

## 2023-01-01 RX ORDER — POLYETHYLENE GLYCOL 3350 17 G/17G
17 POWDER, FOR SOLUTION ORAL DAILY PRN
Status: DISCONTINUED | OUTPATIENT
Start: 2023-01-01 | End: 2023-01-01 | Stop reason: HOSPADM

## 2023-01-01 RX ORDER — NITROGLYCERIN 0.4 MG/1
0.4 TABLET SUBLINGUAL EVERY 5 MIN PRN
COMMUNITY
Start: 2022-01-25 | End: 2023-01-01 | Stop reason: SDUPTHER

## 2023-01-01 RX ORDER — METOPROLOL TARTRATE 25 MG/1
25 TABLET, FILM COATED ORAL 2 TIMES DAILY
Status: DISCONTINUED | OUTPATIENT
Start: 2023-01-01 | End: 2023-01-01 | Stop reason: HOSPADM

## 2023-01-01 RX ORDER — METOPROLOL TARTRATE 25 MG/1
25 TABLET, FILM COATED ORAL 2 TIMES DAILY
COMMUNITY
End: 2024-01-01

## 2023-01-01 RX ORDER — ALUMINUM HYDROXIDE, MAGNESIUM HYDROXIDE, AND SIMETHICONE 1200; 120; 1200 MG/30ML; MG/30ML; MG/30ML
30 SUSPENSION ORAL 4 TIMES DAILY PRN
Status: DISCONTINUED | OUTPATIENT
Start: 2023-01-01 | End: 2023-01-01 | Stop reason: HOSPADM

## 2023-01-01 RX ORDER — IBUPROFEN 200 MG
1 TABLET ORAL DAILY
Status: DISCONTINUED | OUTPATIENT
Start: 2023-01-01 | End: 2023-01-01 | Stop reason: HOSPADM

## 2023-01-01 RX ORDER — ONDANSETRON HYDROCHLORIDE 2 MG/ML
4 INJECTION, SOLUTION INTRAVENOUS EVERY 4 HOURS PRN
Status: DISCONTINUED | OUTPATIENT
Start: 2023-01-01 | End: 2023-01-01 | Stop reason: HOSPADM

## 2023-01-01 RX ORDER — PHENYLEPHRINE HCL IN 0.9% NACL 1 MG/10 ML
SYRINGE (ML) INTRAVENOUS AS NEEDED
Status: DISCONTINUED | OUTPATIENT
Start: 2023-01-01 | End: 2023-01-01

## 2023-01-01 RX ORDER — FERROUS GLUCONATE 325 MG
38 TABLET ORAL
Qty: 30 TABLET | Refills: 0 | Status: SHIPPED | OUTPATIENT
Start: 2023-01-01 | End: 2024-01-01

## 2023-01-01 RX ADMIN — METOPROLOL TARTRATE 25 MG: 25 TABLET, FILM COATED ORAL at 20:31

## 2023-01-01 RX ADMIN — PROPOFOL 100 MG: 10 INJECTION, EMULSION INTRAVENOUS at 08:52

## 2023-01-01 RX ADMIN — CLOPIDOGREL BISULFATE 75 MG: 75 TABLET, FILM COATED ORAL at 09:46

## 2023-01-01 RX ADMIN — Medication 200 MCG: at 08:45

## 2023-01-01 RX ADMIN — METOPROLOL TARTRATE 25 MG: 25 TABLET, FILM COATED ORAL at 22:57

## 2023-01-01 RX ADMIN — METOPROLOL TARTRATE 25 MG: 25 TABLET, FILM COATED ORAL at 19:55

## 2023-01-01 RX ADMIN — TICAGRELOR 180 MG: 90 TABLET ORAL at 16:37

## 2023-01-01 RX ADMIN — ATORVASTATIN CALCIUM 40 MG: 20 TABLET, FILM COATED ORAL at 09:07

## 2023-01-01 RX ADMIN — NICOTINE 1 PATCH: 14 PATCH, EXTENDED RELEASE TRANSDERMAL at 08:20

## 2023-01-01 RX ADMIN — HEPARIN SODIUM 2000 UNITS: 5000 INJECTION INTRAVENOUS; SUBCUTANEOUS at 02:35

## 2023-01-01 RX ADMIN — PROPOFOL 200 MG: 10 INJECTION, EMULSION INTRAVENOUS at 08:35

## 2023-01-01 RX ADMIN — ATORVASTATIN CALCIUM 40 MG: 20 TABLET, FILM COATED ORAL at 08:20

## 2023-01-01 RX ADMIN — METOPROLOL TARTRATE 25 MG: 25 TABLET, FILM COATED ORAL at 08:20

## 2023-01-01 RX ADMIN — METOPROLOL TARTRATE 25 MG: 25 TABLET, FILM COATED ORAL at 22:10

## 2023-01-01 RX ADMIN — Medication 200 MCG: at 08:56

## 2023-01-01 RX ADMIN — NICOTINE 1 PATCH: 14 PATCH, EXTENDED RELEASE TRANSDERMAL at 09:07

## 2023-01-01 RX ADMIN — SODIUM CHLORIDE, SODIUM LACTATE, POTASSIUM CHLORIDE, AND CALCIUM CHLORIDE: 600; 310; 30; 20 INJECTION, SOLUTION INTRAVENOUS at 08:31

## 2023-01-01 RX ADMIN — Medication 200 MCG: at 09:00

## 2023-01-01 RX ADMIN — Medication 100 MCG: at 08:42

## 2023-01-01 RX ADMIN — POLYETHYLENE GLYCOL 3350, SODIUM CHLORIDE, SODIUM BICARBONATE AND POTASSIUM CHLORIDE WITH LEMON FLAVOR 4000 ML: 420; 11.2; 5.72; 1.48 POWDER, FOR SOLUTION ORAL at 16:28

## 2023-01-01 RX ADMIN — CLOPIDOGREL BISULFATE 75 MG: 75 TABLET, FILM COATED ORAL at 09:07

## 2023-01-01 RX ADMIN — CLOPIDOGREL BISULFATE 75 MG: 75 TABLET, FILM COATED ORAL at 08:20

## 2023-01-01 RX ADMIN — NICOTINE 1 PATCH: 14 PATCH, EXTENDED RELEASE TRANSDERMAL at 09:46

## 2023-01-01 RX ADMIN — LABETALOL HYDROCHLORIDE 20 MG: 5 INJECTION, SOLUTION INTRAVENOUS at 17:43

## 2023-01-01 RX ADMIN — PROPOFOL 50 MG: 10 INJECTION, EMULSION INTRAVENOUS at 08:46

## 2023-01-01 RX ADMIN — ASPIRIN 324 MG: 81 TABLET, CHEWABLE ORAL at 16:35

## 2023-01-01 RX ADMIN — METOPROLOL TARTRATE 25 MG: 25 TABLET, FILM COATED ORAL at 09:46

## 2023-01-01 RX ADMIN — HEPARIN SODIUM 1000 UNITS/HR: 10000 INJECTION, SOLUTION INTRAVENOUS at 16:45

## 2023-01-01 RX ADMIN — METOPROLOL TARTRATE 25 MG: 25 TABLET, FILM COATED ORAL at 09:07

## 2023-01-01 RX ADMIN — IRON SUCROSE 100 MG: 20 INJECTION, SOLUTION INTRAVENOUS at 12:13

## 2023-01-01 RX ADMIN — ATORVASTATIN CALCIUM 40 MG: 20 TABLET, FILM COATED ORAL at 13:05

## 2023-01-01 RX ADMIN — PROPOFOL 100 MG: 10 INJECTION, EMULSION INTRAVENOUS at 08:38

## 2023-01-01 RX ADMIN — METOPROLOL TARTRATE 25 MG: 25 TABLET, FILM COATED ORAL at 13:05

## 2023-01-01 RX ADMIN — NICOTINE 1 PATCH: 14 PATCH, EXTENDED RELEASE TRANSDERMAL at 13:05

## 2023-01-01 RX ADMIN — HEPARIN SODIUM 4000 UNITS: 5000 INJECTION INTRAVENOUS; SUBCUTANEOUS at 16:42

## 2023-01-01 RX ADMIN — NITROGLYCERIN 1 INCH: 20 OINTMENT TOPICAL at 16:36

## 2023-01-01 RX ADMIN — Medication 300 MCG: at 08:49

## 2023-01-01 RX ADMIN — ATORVASTATIN CALCIUM 40 MG: 20 TABLET, FILM COATED ORAL at 09:46

## 2023-01-01 RX ADMIN — ASPIRIN 81 MG: 81 TABLET, COATED ORAL at 09:07

## 2023-01-01 RX ADMIN — HEPARIN SODIUM 1200 UNITS/HR: 10000 INJECTION, SOLUTION INTRAVENOUS at 22:52

## 2023-01-01 SDOH — SOCIAL STABILITY: SOCIAL INSECURITY: WITHIN THE LAST YEAR, HAVE YOU BEEN HUMILIATED OR EMOTIONALLY ABUSED IN OTHER WAYS BY YOUR PARTNER OR EX-PARTNER?: NO

## 2023-01-01 SDOH — HEALTH STABILITY: PHYSICAL HEALTH: ON AVERAGE, HOW MANY MINUTES DO YOU ENGAGE IN EXERCISE AT THIS LEVEL?: 30 MIN

## 2023-01-01 SDOH — SOCIAL STABILITY: SOCIAL INSECURITY: ARE YOU OR HAVE YOU BEEN THREATENED OR ABUSED PHYSICALLY, EMOTIONALLY, OR SEXUALLY BY ANYONE?: NO

## 2023-01-01 SDOH — ECONOMIC STABILITY: FOOD INSECURITY: WITHIN THE PAST 12 MONTHS, THE FOOD YOU BOUGHT JUST DIDN'T LAST AND YOU DIDN'T HAVE MONEY TO GET MORE.: NEVER TRUE

## 2023-01-01 SDOH — SOCIAL STABILITY: SOCIAL INSECURITY
WITHIN THE LAST YEAR, HAVE TO BEEN RAPED OR FORCED TO HAVE ANY KIND OF SEXUAL ACTIVITY BY YOUR PARTNER OR EX-PARTNER?: NO

## 2023-01-01 SDOH — ECONOMIC STABILITY: GENERAL

## 2023-01-01 SDOH — ECONOMIC STABILITY: INCOME INSECURITY: HOW HARD IS IT FOR YOU TO PAY FOR THE VERY BASICS LIKE FOOD, HOUSING, MEDICAL CARE, AND HEATING?: NOT HARD AT ALL

## 2023-01-01 SDOH — SOCIAL STABILITY: SOCIAL NETWORK: ARE YOU MARRIED, WIDOWED, DIVORCED, SEPARATED, NEVER MARRIED, OR LIVING WITH A PARTNER?: LIVING WITH PARTNER

## 2023-01-01 SDOH — HEALTH STABILITY: MENTAL HEALTH: CURRENT SMOKER: 1

## 2023-01-01 SDOH — SOCIAL STABILITY: SOCIAL INSECURITY: DO YOU FEEL ANYONE HAS EXPLOITED OR TAKEN ADVANTAGE OF YOU FINANCIALLY OR OF YOUR PERSONAL PROPERTY?: NO

## 2023-01-01 SDOH — SOCIAL STABILITY: SOCIAL INSECURITY: WITHIN THE LAST YEAR, HAVE YOU BEEN AFRAID OF YOUR PARTNER OR EX-PARTNER?: NO

## 2023-01-01 SDOH — ECONOMIC STABILITY: HOUSING INSECURITY
IN THE LAST 12 MONTHS, WAS THERE A TIME WHEN YOU DID NOT HAVE A STEADY PLACE TO SLEEP OR SLEPT IN A SHELTER (INCLUDING NOW)?: NO

## 2023-01-01 SDOH — ECONOMIC STABILITY: FOOD INSECURITY: WITHIN THE PAST 12 MONTHS, YOU WORRIED THAT YOUR FOOD WOULD RUN OUT BEFORE YOU GOT MONEY TO BUY MORE.: NEVER TRUE

## 2023-01-01 SDOH — SOCIAL STABILITY: SOCIAL INSECURITY: ARE THERE ANY APPARENT SIGNS OF INJURIES/BEHAVIORS THAT COULD BE RELATED TO ABUSE/NEGLECT?: NO

## 2023-01-01 SDOH — ECONOMIC STABILITY: TRANSPORTATION INSECURITY
IN THE PAST 12 MONTHS, HAS THE LACK OF TRANSPORTATION KEPT YOU FROM MEDICAL APPOINTMENTS OR FROM GETTING MEDICATIONS?: NO

## 2023-01-01 SDOH — ECONOMIC STABILITY: INCOME INSECURITY: IN THE LAST 12 MONTHS, WAS THERE A TIME WHEN YOU WERE NOT ABLE TO PAY THE MORTGAGE OR RENT ON TIME?: NO

## 2023-01-01 SDOH — ECONOMIC STABILITY: HOUSING INSECURITY: IN THE LAST 12 MONTHS, HOW MANY PLACES HAVE YOU LIVED?: 1

## 2023-01-01 SDOH — SOCIAL STABILITY: SOCIAL INSECURITY
WITHIN THE LAST YEAR, HAVE YOU BEEN KICKED, HIT, SLAPPED, OR OTHERWISE PHYSICALLY HURT BY YOUR PARTNER OR EX-PARTNER?: NO

## 2023-01-01 SDOH — SOCIAL STABILITY: SOCIAL INSECURITY: WERE YOU ABLE TO COMPLETE ALL THE BEHAVIORAL HEALTH SCREENINGS?: YES

## 2023-01-01 SDOH — SOCIAL STABILITY: SOCIAL INSECURITY: HAS ANYONE EVER THREATENED TO HURT YOUR FAMILY OR YOUR PETS?: NO

## 2023-01-01 SDOH — HEALTH STABILITY: PHYSICAL HEALTH: ON AVERAGE, HOW MANY DAYS PER WEEK DO YOU ENGAGE IN MODERATE TO STRENUOUS EXERCISE (LIKE A BRISK WALK)?: 4 DAYS

## 2023-01-01 SDOH — ECONOMIC STABILITY: TRANSPORTATION INSECURITY
IN THE PAST 12 MONTHS, HAS LACK OF TRANSPORTATION KEPT YOU FROM MEETINGS, WORK, OR FROM GETTING THINGS NEEDED FOR DAILY LIVING?: NO

## 2023-01-01 SDOH — SOCIAL STABILITY: SOCIAL INSECURITY: DO YOU FEEL UNSAFE GOING BACK TO THE PLACE WHERE YOU ARE LIVING?: NO

## 2023-01-01 SDOH — SOCIAL STABILITY: SOCIAL INSECURITY: ABUSE: ADULT

## 2023-01-01 SDOH — SOCIAL STABILITY: SOCIAL INSECURITY: HAVE YOU HAD THOUGHTS OF HARMING ANYONE ELSE?: NO

## 2023-01-01 SDOH — SOCIAL STABILITY: SOCIAL INSECURITY: DOES ANYONE TRY TO KEEP YOU FROM HAVING/CONTACTING OTHER FRIENDS OR DOING THINGS OUTSIDE YOUR HOME?: NO

## 2023-01-01 SDOH — SOCIAL STABILITY: SOCIAL NETWORK

## 2023-01-01 ASSESSMENT — PAIN SCALES - GENERAL
PAINLEVEL_OUTOF10: 4
PAINLEVEL_OUTOF10: 0 - NO PAIN
PAINLEVEL_OUTOF10: 1
PAINLEVEL_OUTOF10: 0 - NO PAIN
PAIN_LEVEL: 0
PAINLEVEL_OUTOF10: 0 - NO PAIN
PAINLEVEL_OUTOF10: 0 - NO PAIN
PAINLEVEL_OUTOF10: 2
PAINLEVEL_OUTOF10: 0 - NO PAIN

## 2023-01-01 ASSESSMENT — HEART SCORE
AGE: 45-64
ECG: NON-SPECIFIC REPOLARIZATION DISTURBANCE
RISK FACTORS: >2 RISK FACTORS OR HX OF ATHEROSCLEROTIC DISEASE
HISTORY: HIGHLY SUSPICIOUS
TROPONIN: 1-3 TIMES NORMAL LIMIT
HEART SCORE: 7

## 2023-01-01 ASSESSMENT — LIFESTYLE VARIABLES
SUBSTANCE_ABUSE_PAST_12_MONTHS: NO
AUDIT-C TOTAL SCORE: 1
HOW MANY STANDARD DRINKS CONTAINING ALCOHOL DO YOU HAVE ON A TYPICAL DAY: 1 OR 2
HOW OFTEN DO YOU HAVE A DRINK CONTAINING ALCOHOL: MONTHLY OR LESS
REASON UNABLE TO ASSESS: NO
SKIP TO QUESTIONS 9-10: 1
EVER FELT BAD OR GUILTY ABOUT YOUR DRINKING: NO
HAVE PEOPLE ANNOYED YOU BY CRITICIZING YOUR DRINKING: NO
PRESCIPTION_ABUSE_PAST_12_MONTHS: NO
AUDIT-C TOTAL SCORE: 1
HOW OFTEN DO YOU HAVE 6 OR MORE DRINKS ON ONE OCCASION: NEVER
HAVE YOU EVER FELT YOU SHOULD CUT DOWN ON YOUR DRINKING: NO
EVER HAD A DRINK FIRST THING IN THE MORNING TO STEADY YOUR NERVES TO GET RID OF A HANGOVER: NO

## 2023-01-01 ASSESSMENT — COGNITIVE AND FUNCTIONAL STATUS - GENERAL
MOBILITY SCORE: 24
DAILY ACTIVITIY SCORE: 24
PATIENT BASELINE BEDBOUND: NO
MOBILITY SCORE: 24
DAILY ACTIVITIY SCORE: 24
MOBILITY SCORE: 24
DAILY ACTIVITIY SCORE: 24
MOBILITY SCORE: 24
MOBILITY SCORE: 24
DAILY ACTIVITIY SCORE: 24
MOBILITY SCORE: 24
MOBILITY SCORE: 24

## 2023-01-01 ASSESSMENT — ACTIVITIES OF DAILY LIVING (ADL)
HEARING - LEFT EAR: FUNCTIONAL
GROOMING: INDEPENDENT
PATIENT'S MEMORY ADEQUATE TO SAFELY COMPLETE DAILY ACTIVITIES?: YES
BATHING: INDEPENDENT
WALKS IN HOME: INDEPENDENT
ASSISTIVE_DEVICE: DENTURES UPPER;DENTURES LOWER
DRESSING YOURSELF: INDEPENDENT
JUDGMENT_ADEQUATE_SAFELY_COMPLETE_DAILY_ACTIVITIES: YES
ADEQUATE_TO_COMPLETE_ADL: YES
TOILETING: INDEPENDENT
HEARING - RIGHT EAR: FUNCTIONAL
LACK_OF_TRANSPORTATION: NO
LACK_OF_TRANSPORTATION: NO
FEEDING YOURSELF: INDEPENDENT

## 2023-01-01 ASSESSMENT — PAIN DESCRIPTION - LOCATION
LOCATION: CHEST
LOCATION: CHEST

## 2023-01-01 ASSESSMENT — PATIENT HEALTH QUESTIONNAIRE - PHQ9
1. LITTLE INTEREST OR PLEASURE IN DOING THINGS: NOT AT ALL
SUM OF ALL RESPONSES TO PHQ9 QUESTIONS 1 & 2: 0
2. FEELING DOWN, DEPRESSED OR HOPELESS: NOT AT ALL

## 2023-01-01 ASSESSMENT — COLUMBIA-SUICIDE SEVERITY RATING SCALE - C-SSRS
2. HAVE YOU ACTUALLY HAD ANY THOUGHTS OF KILLING YOURSELF?: NO
1. IN THE PAST MONTH, HAVE YOU WISHED YOU WERE DEAD OR WISHED YOU COULD GO TO SLEEP AND NOT WAKE UP?: NO
6. HAVE YOU EVER DONE ANYTHING, STARTED TO DO ANYTHING, OR PREPARED TO DO ANYTHING TO END YOUR LIFE?: NO

## 2023-01-01 ASSESSMENT — PAIN DESCRIPTION - DESCRIPTORS: DESCRIPTORS: TIGHTNESS

## 2023-01-01 ASSESSMENT — PAIN DESCRIPTION - PAIN TYPE: TYPE: ACUTE PAIN

## 2023-02-14 ENCOUNTER — HOSPITAL ENCOUNTER (OUTPATIENT)
Dept: MRI IMAGING | Age: 63
Discharge: HOME | End: 2023-02-16
Payer: COMMERCIAL

## 2023-02-14 DIAGNOSIS — S66.912S STRAIN OF LEFT WRIST, SEQUELA: ICD-10-CM

## 2023-02-14 PROCEDURE — 73218 MRI UPPER EXTREMITY W/O DYE: CPT

## 2023-10-13 PROBLEM — I10 ESSENTIAL HYPERTENSION: Status: ACTIVE | Noted: 2023-01-01

## 2023-10-13 PROBLEM — I25.10 CAD S/P PERCUTANEOUS CORONARY ANGIOPLASTY: Status: ACTIVE | Noted: 2023-01-01

## 2023-10-13 PROBLEM — N18.30 CHRONIC KIDNEY DISEASE, STAGE 3 (MULTI): Status: ACTIVE | Noted: 2023-01-01

## 2023-10-13 PROBLEM — Z86.73 HISTORY OF CVA (CEREBROVASCULAR ACCIDENT): Status: ACTIVE | Noted: 2023-01-01

## 2023-10-13 PROBLEM — T14.8XXA BRUISING: Status: ACTIVE | Noted: 2023-01-01

## 2023-10-13 PROBLEM — Z98.61 CAD S/P PERCUTANEOUS CORONARY ANGIOPLASTY: Status: ACTIVE | Noted: 2023-01-01

## 2023-10-13 PROBLEM — Z95.5 PRESENCE OF STENT IN CORONARY ARTERY: Status: ACTIVE | Noted: 2023-01-01

## 2023-10-13 PROBLEM — E78.5 DYSLIPIDEMIA: Status: ACTIVE | Noted: 2023-01-01

## 2023-10-13 PROBLEM — I65.29 CAROTID ATHEROSCLEROSIS: Status: ACTIVE | Noted: 2023-01-01

## 2023-10-13 PROBLEM — I73.9 PAD (PERIPHERAL ARTERY DISEASE) (CMS-HCC): Status: ACTIVE | Noted: 2023-01-01

## 2023-10-13 PROBLEM — E78.2 MIXED HYPERLIPIDEMIA: Status: ACTIVE | Noted: 2023-01-01

## 2023-10-13 PROBLEM — R07.9 CHEST PAIN: Status: ACTIVE | Noted: 2023-01-01

## 2023-10-13 PROBLEM — Z86.73 HISTORY OF TRANSIENT ISCHEMIC ATTACK: Status: ACTIVE | Noted: 2023-01-01

## 2023-10-13 PROBLEM — R94.30 ABNORMAL RESULTS OF CARDIOVASCULAR FUNCTION STUDIES: Status: ACTIVE | Noted: 2023-01-01

## 2023-10-13 PROBLEM — R42 DIZZINESS: Status: ACTIVE | Noted: 2023-01-01

## 2023-10-13 PROBLEM — T82.898A: Status: ACTIVE | Noted: 2023-01-01

## 2023-10-13 PROBLEM — I25.2 HISTORY OF ACUTE MYOCARDIAL INFARCTION: Status: ACTIVE | Noted: 2023-01-01

## 2023-10-13 PROBLEM — I73.9 INTERMITTENT CLAUDICATION (CMS-HCC): Status: ACTIVE | Noted: 2023-01-01

## 2023-10-13 PROBLEM — R47.81 SLURRED SPEECH: Status: ACTIVE | Noted: 2023-01-01

## 2023-10-13 PROBLEM — F17.200 CURRENT SMOKER: Status: ACTIVE | Noted: 2023-01-01

## 2023-10-13 PROBLEM — R09.89 CAROTID BRUIT: Status: ACTIVE | Noted: 2023-01-01

## 2023-12-04 PROBLEM — I21.4 NSTEMI (NON-ST ELEVATED MYOCARDIAL INFARCTION) (MULTI): Status: ACTIVE | Noted: 2023-01-01

## 2023-12-04 NOTE — Clinical Note
Multiple views of the left coronary artery obtained using power injection. Rate = 4 mL/sec. Total volume = 8 mL.

## 2023-12-04 NOTE — H&P
Medical Group History and Physical    ASSESSMENT/PLAN:     NSTEMI  CAD s/p PCI  -typical central chest pain radiating to left arm occurring at relative rest  -trops elevated 223-273  -received asa, brilinta load in ED  -cont heparin drip  -metoprolol  -cardiology consultation, discussed w/ pt likely need for LHC  -echo (please order in am after seen by cards)    HTN urgency  -improved with IV tx in ED  -resume home orals, adjust as needed    HLD  Carotid stenosis  -resume home meds once verified    Tobacco use disorder  -nicotine patch  -counseled cessation      VTE Prophylaxis: heparin      HISTORY OF PRESENT ILLNESS:   Chief Complaint: chest pain    History Of Present Illness:    Ranjit Zurita is a 63 y.o. male with a significant past medical history of CAD status post multiple prior PCI, hypertension, hyperlipidemia, who is presenting to the emergency department with chest pain.  Says that pain began after lunch while he was at work earlier today.  The pain was central and radiated into his left arm.  He says that he was not exerting himself at the time.  He had some associated shortness of breath and the chest pain was bad.  Pain and dyspnea have now resolved after nitro in the emergency department.  He reports that for the past 2 months he has had intermittent epigastric pain that does not radiate.  It usually occurs 30 minutes after eating and he thought that this was GERD or something similar so he did not pay much attention to it.  He has not experienced any recent exertional chest pain leading up to the event today.  Denies any lower extremity swelling.  He takes aspirin and Plavix at home and denies missing any doses.  Otherwise he denies any abdominal pain, nausea vomiting, diarrhea.  No neurologic complaints.  No fever.       Review of systems: 10 point review of systems is otherwise negative except as mentioned above.    PAST HISTORIES:       Past Medical History:  He has a past medical history of  Atherosclerotic heart disease of native coronary artery without angina pectoris, Encounter for screening for malignant neoplasm of colon, Neuralgia and neuritis, unspecified, Pain in leg, unspecified, Pain in unspecified foot, Pain in unspecified shoulder, Personal history of other diseases of the circulatory system, Personal history of other diseases of urinary system, Personal history of other endocrine, nutritional and metabolic disease, Personal history of other endocrine, nutritional and metabolic disease, and Personal history of other specified conditions.    Past Surgical History:  He has a past surgical history that includes Other surgical history (05/28/2019).      Social History:  He has no history on file for tobacco use, alcohol use, and drug use.  Smokes 1/2-1ppd; no etoh or drugs; active/independent at baseline    Family History:  No family history on file.     Allergies:  Cyclobenzaprine and Losartan      OBJECTIVE:       Last Recorded Vitals:  Vitals:    12/04/23 1635 12/04/23 1702 12/04/23 1744 12/04/23 1759   BP: (!) 209/87 (!) 185/80 (!) 194/70 143/67   Pulse: 95 90 93 82   Resp:  16 15 15   SpO2:  99% 98% 97%   Weight:       Height:           Last I/O:  No intake/output data recorded.    Physical Exam  Vitals reviewed.   Constitutional:       Appearance: Normal appearance.   HENT:      Head: Normocephalic and atraumatic.      Nose: Nose normal.      Mouth/Throat:      Mouth: Mucous membranes are moist.      Pharynx: Oropharynx is clear.   Eyes:      Extraocular Movements: Extraocular movements intact.      Conjunctiva/sclera: Conjunctivae normal.      Pupils: Pupils are equal, round, and reactive to light.   Cardiovascular:      Rate and Rhythm: Regular rhythm.      Heart sounds: No murmur heard.     No gallop.   Pulmonary:      Effort: Pulmonary effort is normal. No respiratory distress.      Breath sounds: Normal breath sounds. No wheezing, rhonchi or rales.   Abdominal:      General: Abdomen  is flat. Bowel sounds are normal. There is no distension.      Palpations: Abdomen is soft.      Tenderness: There is no abdominal tenderness. There is no guarding or rebound.   Musculoskeletal:         General: Normal range of motion.   Skin:     General: Skin is warm and dry.   Neurological:      General: No focal deficit present.      Mental Status: He is alert and oriented to person, place, and time.      Cranial Nerves: No cranial nerve deficit.      Sensory: No sensory deficit.      Motor: No weakness.   Psychiatric:         Mood and Affect: Mood normal.         Behavior: Behavior normal.           Inpatient Medications:     PRN medications: heparin    Outpatient Medications:  Prior to Admission medications    Medication Sig Start Date End Date Taking? Authorizing Provider   amLODIPine (Norvasc) 10 mg tablet TAKE 1 TABLET MY MOUTH DAILY AS DIRECTED. 10/13/23   Mac Lomas MD   amLODIPine (Norvasc) 10 mg tablet Take 1 tablet (10 mg) by mouth once daily. 10/7/23   Historical Provider, MD   aspirin 81 mg EC tablet Take 1 tablet (81 mg) by mouth once daily. 1/25/22   Historical Provider, MD   atorvastatin (Lipitor) 40 mg tablet Take 1 tablet (40 mg) by mouth once daily.    Historical Provider, MD   clopidogrel (Plavix) 75 mg tablet Take 1 tablet (75 mg) by mouth once daily.    Historical Provider, MD   metoprolol tartrate (Lopressor) 25 mg tablet Take 1 tablet (25 mg) by mouth 2 times a day.    Historical Provider, MD   multivitamin tablet Take 1 tablet by mouth once daily. 1/25/22   Historical Provider, MD   nitroglycerin (Nitrostat) 0.4 mg SL tablet Place 1 tablet (0.4 mg) under the tongue every 5 minutes if needed for chest pain (up to 3 doses). 1/25/22   Historical Provider, MD       LABS AND IMAGING:     Last Labs:  CBC - 12/4/2023:  2:31 PM  7.6 9.5 204    32.6      CMP - 12/4/2023:  2:31 PM  9.4 6.8 17 --- 0.4   _ 4.4 23 76      PTT - 12/4/2023:  4:13 PM  1.1   12.2 35     Troponin I, High  Sensitivity   Date/Time Value Ref Range Status   12/04/2023 04:13  (HH) 0 - 20 ng/L Final     Comment:     Previous result verified on 12/4/2023 1516 on specimen/case [id number] called with component [facility] for procedure Troponin I, High Sensitivity, Initial with value 223 ng/L.   12/04/2023 02:31  (HH) 0 - 20 ng/L Final     BNP   Date/Time Value Ref Range Status   12/04/2023 02:31 PM 30 0 - 99 pg/mL Final

## 2023-12-04 NOTE — Clinical Note
Single view of the right femoral artery obtained using power injection. Rate = 4 mL/sec. Total volume = 8 mL.

## 2023-12-04 NOTE — ED PROCEDURE NOTE
Procedure  Critical Care    Performed by: Massimo BROOKS MD  Authorized by: Massimo BROOKS MD    Critical care provider statement:     Critical care time (minutes):  32    Critical care time was exclusive of:  Separately billable procedures and treating other patients    Critical care was necessary to treat or prevent imminent or life-threatening deterioration of the following conditions:  Cardiac failure (NSTEMI)    Critical care was time spent personally by me on the following activities:  Blood draw for specimens, discussions with primary provider, evaluation of patient's response to treatment, examination of patient, ordering and performing treatments and interventions, ordering and review of laboratory studies, ordering and review of radiographic studies, pulse oximetry and re-evaluation of patient's condition    Care discussed with: admitting provider                 Massimo BROOKS MD  12/04/23 8997

## 2023-12-04 NOTE — Clinical Note
Single view of the right coronary artery obtained using power injection. Rate = 4 mL/sec. Total volume = 8 mL. Post stenting angio taken

## 2023-12-04 NOTE — Clinical Note
Single view of the right coronary artery obtained using power injection. Rate = 4 mL/sec. Total volume = 8 mL.

## 2023-12-04 NOTE — Clinical Note
Single view of the left ventricle obtained using power injection. Rate = 20 mL/sec. Total volume = 10 mL.

## 2023-12-04 NOTE — ED PROVIDER NOTES
HPI   Chief Complaint   Patient presents with    Chest Pain     Started 30 min ago took nitroglycerinHAS STENTS         History provided by:  Patient  63-year-old male presents with 1 day history of central chest pain radiating to his left shoulder.  This started approximately 1 hour ago while working.  He denies any strenuous activity.  Patient took 2 nitroglycerin at home which has eased up the pain.  No fever, chills or cough.  No shortness of breath.  No back or flank pain.  No nausea or vomiting.  No lightheadedness or dizziness.  No abdominal pain.  No leg swelling.  No trauma or travel.  No sick contacts.                    No data recorded HEART Score: 7                Patient History   Past Medical History:   Diagnosis Date    Atherosclerotic heart disease of native coronary artery without angina pectoris     3-vessel CAD    Encounter for screening for malignant neoplasm of colon     Encounter for screening colonoscopy    Neuralgia and neuritis, unspecified     Neuralgia    Pain in leg, unspecified     Lower extremity pain    Pain in unspecified foot     Foot pain    Pain in unspecified shoulder     Shoulder pain    Personal history of other diseases of the circulatory system     History of hypertension    Personal history of other diseases of urinary system     History of chronic kidney disease    Personal history of other endocrine, nutritional and metabolic disease     History of high cholesterol    Personal history of other endocrine, nutritional and metabolic disease     History of hyperlipidemia    Personal history of other specified conditions     History of fatigue     Past Surgical History:   Procedure Laterality Date    OTHER SURGICAL HISTORY  05/28/2019    Coronary artery stent placement     No family history on file.  Social History     Tobacco Use    Smoking status: Not on file    Smokeless tobacco: Not on file   Substance Use Topics    Alcohol use: Not on file    Drug use: Not on file        Physical Exam   ED Triage Vitals [12/04/23 1419]   Temp Heart Rate Resp BP   -- 99 -- 119/72      SpO2 Temp src Heart Rate Source Patient Position   95 % -- -- --      BP Location FiO2 (%)     -- --       Physical Exam  Physical Exam:    ED Triage Vitals [12/04/23 1419]   Temp Heart Rate Resp BP   -- 99 -- 119/72      SpO2 Temp src Heart Rate Source Patient Position   95 % -- -- --      BP Location FiO2 (%)     -- --         Constitutional: Vital signs per nursing notes.  Well developed, well nourished.  No acute distress.    Psychiatric: alert and oriented to person, place, and time; no abnormalities of mood or affect; memory intact  Eyes: PERRL; conjunctivae and lids normal; EOMI  ENT: otoscopic exam of external canal and TM´s normal; nasal mucosa, turbinates, and septum normal; mouth, tongue, and pharynx normal; pharynx without edema, exudate, or injection  Respiratory: normal respiratory effort and excursion; no rales, rhonchi, or wheezes; equal but diminished air entry  Cardiovascular: regular rate and rhythm; no murmurs, rubs or gallops; symmetric pulses; no edema; normal capillary refill; distal pulses present  Neurological: normal speech; CN II-XII grossly intact; normal motor and sensory function; no nystagmus  GI: no masses, tenderness, rebound or guarding; no palpable, pulsatile mass; no organomegaly; no hernia; normal bowel sounds; (-) Cm´s sign; (-) McBurney´s sign; (-) CVA tenderness  Lymphatic: no adenopathy of neck  Musculoskeletal: normal gait and station; normal digits and nails; no gross tendon or ligament injury; normal to palpation; normal strength/tone; neurovascular status intact; (-) Aranza´s sign  Skin: normal to inspection; normal to palpation; no rash  GCS: 15    ED Course & MDM   Diagnoses as of 12/04/23 1735   NSTEMI (non-ST elevated myocardial infarction) (CMS/HCC)   Anemia, unspecified type       Medical Decision Making  Medical Decision Making:    Differential Diagnoses  Considered: ACS, arrhythmia, pneumonia, PE, GERD     EKG: My interpretation of EKG is sinus tachycardia 103 bpm with right bundle branch block and nonspecific ST-T changes           My interpretation of second EKG is normal sinus rhythm at 95 bpm with right bundle branch block and nonspecific ST-T changes.  There is no significant change from the initial EKG.    Labs Reviewed   CBC WITH AUTO DIFFERENTIAL - Abnormal       Result Value    WBC 7.6      nRBC 0.0      RBC 4.17 (*)     Hemoglobin 9.5 (*)     Hematocrit 32.6 (*)     MCV 78 (*)     MCH 22.8 (*)     MCHC 29.1 (*)     RDW 16.3 (*)     Platelets 204      Neutrophils % 79.2      Immature Granulocytes %, Automated 0.3      Lymphocytes % 13.0      Monocytes % 7.2      Eosinophils % 0.0      Basophils % 0.3      Neutrophils Absolute 6.02      Immature Granulocytes Absolute, Automated 0.02      Lymphocytes Absolute 0.99 (*)     Monocytes Absolute 0.55      Eosinophils Absolute 0.00      Basophils Absolute 0.02     COMPREHENSIVE METABOLIC PANEL - Abnormal    Glucose 129 (*)     Sodium 138      Potassium 3.6      Chloride 107      Bicarbonate 22      Anion Gap 13      Urea Nitrogen 20      Creatinine 1.40 (*)     eGFR 57 (*)     Calcium 9.4      Albumin 4.4      Alkaline Phosphatase 76      Total Protein 6.8      AST 17      Bilirubin, Total 0.4      ALT 23     SERIAL TROPONIN-INITIAL - Abnormal    Troponin I, High Sensitivity 223 (*)     Narrative:     Less than 99th percentile of normal range cutoff-  Female and children under 18 years old <14 ng/L; Male <21 ng/L: Negative  Repeat testing should be performed if clinically indicated.     Female and children under 18 years old 14-50 ng/L; Male 21-50 ng/L:  Consistent with possible cardiac damage and possible increased clinical   risk. Serial measurements may help to assess extent of myocardial damage.     >50 ng/L: Consistent with cardiac damage, increased clinical risk and  myocardial infarction. Serial measurements  may help assess extent of   myocardial damage.      NOTE: Children less than 1 year old may have higher baseline troponin   levels and results should be interpreted in conjunction with the overall   clinical context.     NOTE: Troponin I testing is performed using a different   testing methodology at [facility] than at other   [facility]. Direct result comparisons should only   be made within the same method.   SERIAL TROPONIN, 1 HOUR - Abnormal    Troponin I, High Sensitivity 273 (*)     Narrative:     Less than 99th percentile of normal range cutoff-  Female and children under 18 years old <14 ng/L; Male <21 ng/L: Negative  Repeat testing should be performed if clinically indicated.     Female and children under 18 years old 14-50 ng/L; Male 21-50 ng/L:  Consistent with possible cardiac damage and possible increased clinical   risk. Serial measurements may help to assess extent of myocardial damage.     >50 ng/L: Consistent with cardiac damage, increased clinical risk and  myocardial infarction. Serial measurements may help assess extent of   myocardial damage.      NOTE: Children less than 1 year old may have higher baseline troponin   levels and results should be interpreted in conjunction with the overall   clinical context.     NOTE: Troponin I testing is performed using a different   testing methodology at [facility] than at other   [facility]. Direct result comparisons should only   be made within the same method.   MAGNESIUM - Normal    Magnesium 1.84     B-TYPE NATRIURETIC PEPTIDE - Normal    BNP 30      Narrative:        <100 pg/mL - Heart failure unlikely  100-299 pg/mL - Intermediate probability of acute heart                  failure exacerbation. Correlate with clinical                  context and patient history.    >=300 pg/mL - Heart Failure likely. Correlate with clinical                  context and patient history.    BNP testing is performed using  different testing methodology at [facility] than at other [facility]. Direct result comparisons should only be made within the same method.      PROTIME-INR - Normal    Protime 12.2      INR 1.1     D-DIMER, NON VTE - Normal    D-Dimer Non VTE, Quant (ng/mL FEU) 442      Narrative:     The D-Dimer assay is reported in ng/mL Fibrinogen Equivalent Units (FEU). The results of this assay should NOT be used for the exclusion of Deep Vein Thrombosis and/or Pulmonary Embolism.   APTT - Normal    aPTT 35      Narrative:     The APTT is no longer used for monitoring Unfractionated Heparin Therapy. For monitoring Heparin Therapy, use the Heparin Assay.   TROPONIN SERIES- (INITIAL, 1 HR)    Narrative:     The following orders were created for panel order Troponin I Series, High Sensitivity (0, 1 HR).  Procedure                               Abnormality         Status                     ---------                               -----------         ------                     Troponin I, High Sensiti...[[id number]]  Abnormal            Final result               Troponin, High Sensitivi...[[id number]]  Abnormal            Final result                 Please view results for these tests on the individual orders.       XR chest 1 view   Final Result   1.  No active cardiopulmonary process.             Signed by: Bam Manzanares 12/4/2023 3:58 PM   Dictation workstation:   [id number]          Diagnostic testing considered:         Review of recent and relevant records:    ED Medication Administration:   ED Medication Administration from 12/04/2023 1400 to 12/04/2023 1735         Date/Time Order Dose Route Action Action by     12/04/2023 1635 EST aspirin chewable tablet 324 mg 324 mg oral Given STEPHANE Naranjo     12/04/2023 1636 EST nitroglycerin (Bro-Bid) 2 % ointment 1 inch 1 inch transdermal Given Jose A N     12/04/2023 1637 EST ticagrelor (Brilinta) tablet 180 mg 180 mg oral Given Jose A N     12/04/2023 1642  EST heparin (porcine) injection 4,000 Units 4,000 Units intravenous Given STEPHANE Naranjo     12/04/2023 1645 EST heparin 25,000 Units in dextrose 5% 250 mL (100 Units/mL) infusion (premix) 1,000 Units/hr intravenous New Bag STEPHANE Naranjo            Prescription Medication Consideration/Given:     Social Determinants of Health Significantly Affecting Care:      Summary:    BP (!) 185/80 (12/04/23 1702)    Temp      Pulse 90 (12/04/23 1702)   Resp 16 (12/04/23 1702)    SpO2 99 % (12/04/23 1702)      Abnormal Labs Reviewed   CBC WITH AUTO DIFFERENTIAL - Abnormal; Notable for the following components:       Result Value    RBC 4.17 (*)     Hemoglobin 9.5 (*)     Hematocrit 32.6 (*)     MCV 78 (*)     MCH 22.8 (*)     MCHC 29.1 (*)     RDW 16.3 (*)     Lymphocytes Absolute 0.99 (*)     All other components within normal limits   COMPREHENSIVE METABOLIC PANEL - Abnormal; Notable for the following components:    Glucose 129 (*)     Creatinine 1.40 (*)     eGFR 57 (*)     All other components within normal limits   SERIAL TROPONIN-INITIAL - Abnormal; Notable for the following components:    Troponin I, High Sensitivity 223 (*)     All other components within normal limits    Narrative:     Less than 99th percentile of normal range cutoff-  Female and children under 18 years old <14 ng/L; Male <21 ng/L: Negative  Repeat testing should be performed if clinically indicated.     Female and children under 18 years old 14-50 ng/L; Male 21-50 ng/L:  Consistent with possible cardiac damage and possible increased clinical   risk. Serial measurements may help to assess extent of myocardial damage.     >50 ng/L: Consistent with cardiac damage, increased clinical risk and  myocardial infarction. Serial measurements may help assess extent of   myocardial damage.      NOTE: Children less than 1 year old may have higher baseline troponin   levels and results should be interpreted in conjunction with the overall   clinical context.     NOTE:  Troponin I testing is performed using a different   testing methodology at [facility] than at other   system hospitals. Direct result comparisons should only   be made within the same method.   SERIAL TROPONIN, 1 HOUR - Abnormal; Notable for the following components:    Troponin I, High Sensitivity 273 (*)     All other components within normal limits    Narrative:     Less than 99th percentile of normal range cutoff-  Female and children under 18 years old <14 ng/L; Male <21 ng/L: Negative  Repeat testing should be performed if clinically indicated.     Female and children under 18 years old 14-50 ng/L; Male 21-50 ng/L:  Consistent with possible cardiac damage and possible increased clinical   risk. Serial measurements may help to assess extent of myocardial damage.     >50 ng/L: Consistent with cardiac damage, increased clinical risk and  myocardial infarction. Serial measurements may help assess extent of   myocardial damage.      NOTE: Children less than 1 year old may have higher baseline troponin   levels and results should be interpreted in conjunction with the overall   clinical context.     NOTE: Troponin I testing is performed using a different   testing methodology at [facility] than at other   [facility] hospitals. Direct result comparisons should only   be made within the same method.     Diagnostic evaluation was completed.  BNP was in the normal range at 30 so do not suspect acute congestive heart failure.  Initial troponin is elevated at 223.  This makes me suspicious for acute coronary syndrome.  Metabolic panel was unremarkable except an elevated creatinine of 1.  4 0.  Liver function tests are in the normal range.  Sodium potassium is normal.  D-dimer is in the normal range so do not suspect PE.  INR is in the normal range so there is no evidence of coagulopathy.  CBC shows normal white blood cell count of 7.6 so do not suspect an overwhelming infectious process.  There is  some anemia with a hemoglobin of 9.5.  I do not suspect acute blood loss.  Chest x-ray shows no  active cardiopulmonary process.    I suspect the patient is likely suffering a non-ST elevation MI.  He has been treated with aspirin, nitroglycerin, Brilinta and heparin drip.  He will be hospitalized for further work-up and evaluation and possible cardiac catheterization after consultation with cardiology.    I discussed the results and plan for hospitalization with the patient and/or family/friend if present.  Questions were addressed.  Patient and/or family/friend expressed understanding.    The patient's condition requires ongoing treatment and evaluation necessitating hospital admission.  I have reviewed the patient's history, physical exam, and test information with the admitting physician,    Dr. Mares               , who agrees to hospitalize the patient.  He will consult interventional cardiology for possible cardiac catheterization.        Diagnoses as of 12/04/23 1735   NSTEMI (non-ST elevated myocardial infarction) (CMS/[facility])   Anemia, unspecified type         Procedure  Procedures     Massimo BROOKS MD  12/04/23 1719       Massimo BROOKS MD  12/04/23 1720       Massimo BROOKS MD  12/04/23 1735

## 2023-12-04 NOTE — Clinical Note
Angioplasty of the proximal right coronary artery lesion. Inflation 1: Pressure = 10 sandra; Duration = 30 sec.

## 2023-12-05 NOTE — CONSULTS
Inpatient consult to Cardiology  Consult performed by: Aditya Sanchez DO  Consult ordered by: Mohit Mares MD  Reason for consult: chest pain      Cardiology Consult Note      Date:   12/5/2023  Patient name:  Ranjit Zurita  Date of admission:  12/4/2023  2:32 PM  MRN:   [id number]  YOB: 1960  Time of Consult:  10:26 AM    Consulting Cardiologist: Dr. Aditya Valdez, APRN, CNP  Primary Cardiologist:  Dr. Mac Lomas    Referring Provider: Dr. Montenegro      Admission Diagnosis:     NSTEMI (non-ST elevated myocardial infarction) (CMS/[facility])  CAD      History of Present Illness:      63-year-old male with past medical history of CAD status post multiple prior PCI's, hypertension, hyperlipidemia, current nicotine abuse, carotid arthrosclerosis, CKD stage III, hyperlipidemia, hypertension who presents [facility] emergency department after symptoms of midsternal chest pain.  The patient reports that yesterday he was at work and he started having midsternal to left-sided chest pain.  He did take 2 nitro which seemed to help his pain.  He denied any shortness of breath.  He reports that his pain is similar to his previous heart attack.  He presented to the emergency department.  Initial EKG showed sinus tachycardia with a rate of 103 with a right bundle branch block and nonspecific ST-T changes.  Repeat EKG was similar.  Initial troponin was in the 200 range however this morning it viola to 400.  He was placed on a heparin infusion and admitted to the medical team.  He normally follows with Dr. Mac Lomas.  Cardiology was consulted for elevated troponin and chest pain.       Allergies:     Allergies   Allergen Reactions    Cyclobenzaprine Hives    Losartan Myalgia     Joint pain all over         Past Medical History:     Past Medical History:   Diagnosis Date    Atherosclerotic heart disease of native coronary artery without angina  pectoris     3-vessel CAD    Encounter for screening for malignant neoplasm of colon     Encounter for screening colonoscopy    Neuralgia and neuritis, unspecified     Neuralgia    Pain in leg, unspecified     Lower extremity pain    Pain in unspecified foot     Foot pain    Pain in unspecified shoulder     Shoulder pain    Personal history of other diseases of the circulatory system     History of hypertension    Personal history of other diseases of urinary system     History of chronic kidney disease    Personal history of other endocrine, nutritional and metabolic disease     History of high cholesterol    Personal history of other endocrine, nutritional and metabolic disease     History of hyperlipidemia    Personal history of other specified conditions     History of fatigue       Past Surgical History:     Past Surgical History:   Procedure Laterality Date    OTHER SURGICAL HISTORY  05/28/2019    Coronary artery stent placement       Family History:   Mother had history of malignant neoplasm of breast, father history of congestive heart failure and hypertension  Brother had history of cardiac device and atrial fibrillation    Social History:   Current nicotine abuse, smokes a half a pack of cigarettes per day.  Denies alcohol use  Denies any recreational drug abuse  Daily caffeine consumption    CURRENT INPATIENT MEDICATIONS    aspirin, 81 mg, oral, Daily  atorvastatin, 40 mg, oral, Daily  clopidogrel, 75 mg, oral, Daily  metoprolol tartrate, 25 mg, oral, BID  nicotine, 1 patch, transdermal, Daily      heparin, 0-4,000 Units/hr, Last Rate: 1,400 Units/hr (12/05/23 6991)      Current Outpatient Medications   Medication Instructions    amLODIPine (Norvasc) 10 mg tablet TAKE 1 TABLET MY MOUTH DAILY AS DIRECTED.    amLODIPine (Norvasc) 10 mg tablet Take 1 tablet (10 mg) by mouth once daily.    aspirin 81 mg EC tablet 1 tablet, oral, Daily    atorvastatin (LIPITOR) 40 mg, oral, Daily    clopidogrel (PLAVIX) 75 mg,  "oral, Daily    metoprolol tartrate (LOPRESSOR) 25 mg, oral, 2 times daily    multivitamin tablet 1 tablet, oral, Daily    nitroglycerin (Nitrostat) 0.4 mg SL tablet Place 1 tablet (0.4 mg) under the tongue every 5 minutes if needed for chest pain (up to 3 doses).        Review of Systems:      12 point review of systems was obtained in detail and is negative other than that detailed above.    Vital Signs:     Vitals:    12/04/23 2141 12/05/23 0035 12/05/23 0359 12/05/23 0748   BP: 136/68 136/61 119/66 144/63   BP Location:    Right arm   Patient Position:    Lying   Pulse: 82 74 72 72   Resp: 16 16 20 16   Temp: 36.9 °C (98.4 °F) 36.6 °C (97.9 °F) 36.2 °C (97.2 °F) 36.8 °C (98.2 °F)   TempSrc: Temporal   Temporal   SpO2: 97% 95% 95% 94%   Weight: 83.9 kg (184 lb 15.5 oz)      Height: 1.778 m (5' 10\")        No intake or output data in the 24 hours ending 12/05/23 1026    Wt Readings from Last 4 Encounters:   12/04/23 83.9 kg (184 lb 15.5 oz)   03/01/23 87.6 kg (193 lb 3 oz)   03/02/22 87.7 kg (193 lb 5 oz)       Physical Examination:     GENERAL APPEARANCE: Well developed, well nourished, in no acute distress.  CHEST: Symmetric and non-tender.  INTEGUMENT: Skin warm and dry, without gross excoriationis or lesions.  HEENT: No gross abnormalities of conjunctiva, teeth, gums, oral mucosa  NECK: Supple, no JVD, no bruit. Thyroid not palpable. Carotid upstrokes normal.  NEURO/PSHCY: Alert and oriented x3; appropriate behavior and responses and responses, grossly normal cerebellar function with normal balance and coordination  LUNGS: Clear to auscultation bilaterally; normal respiratory effort.  HEART: Rate and rhythm regular with no evident murmur; no gallop appreciated. There are no rubs, clicks or heaves. PMI nondisplaced.  ABDOMEN: Soft, nontender, no palpable hepatosplenomegaly, no mases, no bruits. Abdominal aorta not noted to be enlarged.  MUSCULOSKELETAL: Ambulatory with normal tandem gait.  EXTREMITIES: Warm with " good color, no clubbing or cyanois. There is no edema noted.  PERIPHERAL VASCULAR: Pulses present and equally palpable; 2+ throughout. No femoral bruits.      Lab:     CBC:   Results from last 7 days   Lab Units 12/04/23  1431   WBC AUTO x10*3/uL 7.6   RBC AUTO x10*6/uL 4.17*   HEMOGLOBIN g/dL 9.5*   HEMATOCRIT % 32.6*   MCV fL 78*   MCH pg 22.8*   MCHC g/dL 29.1*   RDW % 16.3*   PLATELETS AUTO x10*3/uL 204     CMP:    Results from last 7 days   Lab Units 12/04/23  1431   SODIUM mmol/L 138   POTASSIUM mmol/L 3.6   CHLORIDE mmol/L 107   CO2 mmol/L 22   BUN mg/dL 20   CREATININE mg/dL 1.40*   GLUCOSE mg/dL 129*   PROTEIN TOTAL g/dL 6.8   CALCIUM mg/dL 9.4   BILIRUBIN TOTAL mg/dL 0.4   ALK PHOS U/L 76   AST U/L 17   ALT U/L 23     BMP:    Results from last 7 days   Lab Units 12/04/23  1431   SODIUM mmol/L 138   POTASSIUM mmol/L 3.6   CHLORIDE mmol/L 107   CO2 mmol/L 22   BUN mg/dL 20   CREATININE mg/dL 1.40*   CALCIUM mg/dL 9.4   GLUCOSE mg/dL 129*     Magnesium:  Results from last 7 days   Lab Units 12/04/23  1431   MAGNESIUM mg/dL 1.84     Troponin:    Results from last 7 days   Lab Units 12/04/23  2209 12/04/23  1613 12/04/23  1431   TROPHS ng/L 477* 273* 223*     BNP:   Results from last 7 days   Lab Units 12/04/23  1431   BNP pg/mL 30     Lipid Panel:         Diagnostic Studies:       ECG 12 lead    Result Date: 12/5/2023  Normal sinus rhythm Right bundle branch block T wave abnormality, consider inferior ischemia Abnormal ECG When compared with ECG of 04-DEC-2023 16:19, T wave inversion now evident in Anterior leads    XR chest 1 view    Result Date: 12/4/2023  Interpreted By:  Bam Manzanares, STUDY: XR CHEST 1 VIEW;  12/4/2023 3:51 pm   INDICATION: Signs/Symptoms:Chest Pain.   COMPARISON: 10/27/2017   ACCESSION NUMBER(S): [id number]   ORDERING CLINICIAN: NERI HAUSROD   FINDINGS:   The cardiac silhouette is unremarkable. Costophrenic angles are sharp. Lungs are clear. The trachea is midline. There is no  pneumothorax. No acute osseous abnormality is seen.       1.  No active cardiopulmonary process.     Signed by: Bam Manzanares 12/4/2023 3:58 PM Dictation workstation:   [id number]      Radiology:     XR chest 1 view   Final Result   1.  No active cardiopulmonary process.             Signed by: Bam Manzanares 12/4/2023 3:58 PM   Dictation workstation:   [id number]      Cardiac catheterization - coronary    (Results Pending)   Transthoracic Echo (TTE) Complete    (Results Pending)   Vascular US Carotid Artery Duplex Bilateral    (Results Pending)       Problem List:     Patient Active Problem List   Diagnosis    Abnormal results of cardiovascular function studies    Bruising    CAD S/P percutaneous coronary angioplasty    Carotid atherosclerosis    Carotid bruit    Carotid stent occlusion (CMS/HCC)    Chest pain    Chronic kidney disease, stage 3 (CMS/HCC)    Current smoker    Dizziness    Dyslipidemia    Essential hypertension    History of acute myocardial infarction    History of CVA (cerebrovascular accident)    History of transient ischemic attack    Intermittent claudication (CMS/HCC)    Mixed hyperlipidemia    PAD (peripheral artery disease) (CMS/HCC)    Presence of stent in coronary artery    Slurred speech    NSTEMI (non-ST elevated myocardial infarction) (CMS/HCC)       Assessment:   63-year-old gentleman seen evaluate the bedside in conjunction with Gadiel Valdez RN, CNP in the telemetry unit.    Bedside examination evaluation interview were performed by me.    Chart was reviewed in detail discussed the patient at length including prior cardiovascular evaluations current symptomatology and findings and need for catheterization.    IMPRESSION:   1. Coronary artery disease, active angina with findings consistent with non-STEMI  2. Non-ST-segment elevation myocardial infarction in 2017.  3. Multivessel percutaneous coronary intervention including unprotected left main stenting with drug-eluting stent,  October 30, 2017.  4. Carotid artery disease, remote left internal carotid artery stenting, in-stent restenosis with subsequent balloon angioplasty with good results.  5. Moderate atherosclerotic disease and proximal left subclavian artery.  6. Remote cerebrovascular accident with no significant residual disease.  7. Mixed hyperlipidemia.  8. Essential hypertension.  9. Tobacco abuse  10. Overweight.  11. Chronic kidney disease, stage III.    Plan:   Commendation:  Admit to medicine team, continue telemetry monitoring per unit protocol  Supplemental O2  Monitor electrolytes, keep potassium greater than 4 and magnesium greater than 2  Continue aspirin, statin and beta-blocker  Continue heparin infusion, trend assays.  Monitor for signs of bleeding  Troponin elevation in the setting of acute chest pain similar to prior MI.  N.p.o.  Plan for [facility] today  Check echocardiogram  Patient was supposed to have a carotid ultrasound done however was unable to make appointment.  Will plan for carotid ultrasound was in the hospital due to his significant stenosis in the past.  Lipid panel  Nitro morphine for chest pain  Resume cardiac diet after heart catheterization and bedrest  Gentle hydration with known CKD stage III  Offer nicotine patch for smoking cessation  Further recommendations post [facility]    Discussion  This is a 63-year-old gentleman with a longstanding history of coronary disease prior multivessel angioplasty and stenting carotid disease hypertension hyperlipidemia and ongoing tobacco abuse.  He presents to the hospital with recurrent angina in the face of elevating troponin and abnormal EKG.  Catheterization has been recommended.  Patient agrees.  All risk complication alternatives were discussed.  Disposition will follow.    Aditya Sanchez DO,FACC    Gadiel Valdez [facility]  Adult Gerontology Acute Care Nurse Practitioner  [facility]  [phone]    Of note,  this documentation is completed using the Dragon Dictation system (voice recognition software). There may be spelling and/or grammatical errors that were not corrected prior to final submission.      Electronically signed by Aditya Sanchez DO on 12/5/2023 at 10:26 AM

## 2023-12-05 NOTE — CONSULTS
Department of Internal Medicine  Gastroenterology  Consult note      Reason for Consult: Cardiology request for further workup of anemia, needs cardiac stent    Chief Complaint: Chest pain    History of Present Illness      The patient is a 63 y.o. male with signficant past medical history of CAD status post multiple prior PCI- on ASA and PLAVIX at home, hypertension, hyperlipidemia who presents for chest pain with onset 12/4 radiating to the left arm with some associated SOB.    Patient noted to have an NSTEMI with elevated troponin.  He was given a loading dose of 324 mg ASA and Brilinta 12/4 and started on heparin drip in ED.  Patient had cardiac cath 12/5 noting 90% occlusion of the RCA, needs stenting. Cardiology requesting anemia workup since the patient will need to continue dual antiplatelet therapy at home. no abdominal pain, nausea vomiting, diarrhea.  No fever.  No reported hematemesis, melena or hematochezia.  Patient denies a history of anemia in the past.  Patient does report 8 pound unintentional weight loss over the past few weeks.  No constipation.  No regular NSAID use.  Current smoker.  Reports he used to drink anywhere from 6-24 beers daily for 35+ years, but quit about 10 years ago.    Heparin drip since discontinued.  Currently has to remain on Plavix per hospitalist.    Hemoglobin upon arrival 9.5->8.2-> 9.1 today, No previous labs available for comparison          Allergies  Cyclobenzaprine and Losartan    Current Medication    Current Facility-Administered Medications:     acetaminophen (Tylenol) tablet 650 mg, 650 mg, oral, q4h PRN **OR** acetaminophen (Tylenol) oral liquid 650 mg, 650 mg, oral, q4h PRN **OR** acetaminophen (Tylenol) suppository 650 mg, 650 mg, rectal, q4h PRN, Mohit Mares MD    [Held by provider] aspirin EC tablet 81 mg, 81 mg, oral, Daily, Mohit Mares MD, 81 mg at 12/05/23 0907    atorvastatin (Lipitor) tablet 40 mg, 40 mg, oral, Daily, Mohit Mares MD,  40 mg at 12/05/23 0907    clopidogrel (Plavix) tablet 75 mg, 75 mg, oral, Daily, Mohit Mares MD, 75 mg at 12/05/23 0907    metoprolol tartrate (Lopressor) tablet 25 mg, 25 mg, oral, BID, Mohit Mares MD, 25 mg at 12/05/23 0907    nicotine (Nicoderm CQ) 14 mg/24 hr patch 1 patch, 1 patch, transdermal, Daily, Mohit Mares MD, 1 patch at 12/05/23 0907    nitroglycerin (Nitrostat) SL tablet 0.4 mg, 0.4 mg, sublingual, q5 min PRN, Mohit Mares MD    polyethylene glycol (Glycolax, Miralax) packet 17 g, 17 g, oral, Daily PRN, Mohit Mares MD    sodium chloride 0.9% infusion, 100 mL/hr, intravenous, Continuous, Candace Kendrick, APRN-CNP    Past Medical History  Active Ambulatory Problems     Diagnosis Date Noted    Abnormal results of cardiovascular function studies 10/13/2023    Bruising 10/13/2023    CAD S/P percutaneous coronary angioplasty 10/13/2023    Carotid atherosclerosis 10/13/2023    Carotid bruit 10/13/2023    Carotid stent occlusion (CMS/HCC) 10/13/2023    Chest pain 10/13/2023    Chronic kidney disease, stage 3 (CMS/HCC) 10/13/2023    Current smoker 10/13/2023    Dizziness 10/13/2023    Dyslipidemia 10/13/2023    Essential hypertension 10/13/2023    History of acute myocardial infarction 10/13/2023    History of CVA (cerebrovascular accident) 10/13/2023    History of transient ischemic attack 10/13/2023    Intermittent claudication (CMS/HCC) 10/13/2023    Mixed hyperlipidemia 10/13/2023    PAD (peripheral artery disease) (CMS/HCC) 10/13/2023    Presence of stent in coronary artery 10/13/2023    Slurred speech 10/13/2023     Resolved Ambulatory Problems     Diagnosis Date Noted    No Resolved Ambulatory Problems     Past Medical History:   Diagnosis Date    Atherosclerotic heart disease of native coronary artery without angina pectoris     Encounter for screening for malignant neoplasm of colon     Neuralgia and neuritis, unspecified     Pain in leg, unspecified     Pain in unspecified  "foot     Pain in unspecified shoulder     Personal history of other diseases of the circulatory system     Personal history of other diseases of urinary system     Personal history of other endocrine, nutritional and metabolic disease     Personal history of other endocrine, nutritional and metabolic disease     Personal history of other specified conditions        Past Surgical History  Past Surgical History:   Procedure Laterality Date    OTHER SURGICAL HISTORY  05/28/2019    Coronary artery stent placement       Family History  No family history on file.  No family history of stomach or colon cancer    Social History  TOBACCO: Current smoker  ETOH: History of heavy alcohol use for 35+ years anywhere from 6-24 beers a day, but quit about 10 years ago now rarely drinks alcohol per patient  DRUGS:  has no history on file for drug use.  MARITAL STATUS:   OCCUPATION:    Review of Systems  All 10 systems reviewed with the patient/family and negative except for what is mentioned in HPI      PHYSICAL EXAM  VS: /78   Pulse 72   Temp 36.8 °C (98.2 °F) (Temporal)   Resp 14   Ht 1.778 m (5' 10\")   Wt 83.9 kg (184 lb 15.5 oz)   SpO2 99%   BMI 26.54 kg/m²  Body mass index is 26.54 kg/m².  Physical Exam  Vitals reviewed.   Constitutional:       General: He is not in acute distress.     Appearance: Normal appearance.   HENT:      Head: Normocephalic.      Nose: Nose normal.      Mouth/Throat:      Mouth: Mucous membranes are moist.      Pharynx: Oropharynx is clear.   Eyes:      Extraocular Movements: Extraocular movements intact.      Conjunctiva/sclera: Conjunctivae normal.      Pupils: Pupils are equal, round, and reactive to light.   Cardiovascular:      Rate and Rhythm: Normal rate and regular rhythm.      Pulses: Normal pulses.      Heart sounds: Normal heart sounds.   Pulmonary:      Effort: Pulmonary effort is normal.      Breath sounds: Normal breath sounds.   Abdominal:      General: There is no " distension.      Palpations: Abdomen is soft.      Tenderness: There is no abdominal tenderness. There is no guarding or rebound.   Musculoskeletal:         General: Normal range of motion.      Cervical back: Normal range of motion.   Skin:     General: Skin is warm and dry.   Neurological:      General: No focal deficit present.      Mental Status: He is alert and oriented to person, place, and time.   Psychiatric:         Mood and Affect: Mood normal.         Behavior: Behavior normal.          DATA  Recent blood work and relevant radiology and endoscopic studies were reviewed and discussed with the patient   Results from last 7 days   Lab Units 12/05/23  1219   WBC AUTO x10*3/uL 6.0   RBC AUTO x10*6/uL 3.56*   HEMOGLOBIN g/dL 8.2*   HEMATOCRIT % 27.6*   MCV fL 78*   MCHC g/dL 29.7*   RDW % 16.5*   PLATELETS AUTO x10*3/uL 178       Results from last 72 hours   Lab Units 12/05/23  1237 12/04/23  1431   SODIUM mmol/L 139 138   POTASSIUM mmol/L 3.9 3.6   CHLORIDE mmol/L 108* 107   CO2 mmol/L 24 22   BUN mg/dL 15 20   CREATININE mg/dL 1.18 1.40*   CALCIUM mg/dL 8.9 9.4   PROTEIN TOTAL g/dL  --  6.8   BILIRUBIN TOTAL mg/dL  --  0.4   ALK PHOS U/L  --  76   AST U/L  --  17   ALT U/L  --  23       Results from last 72 hours   Lab Units 12/04/23  1431   INR  1.1                 ENDOSCOPIC REVIEW  No prior EGD or colonoscopy per patient      IMPRESSION/RECOMMENDATIONS  The patient is a 63 y.o. male with signficant past medical history of CAD status post multiple prior PCI- on ASA and PLAVIX at home, hypertension, hyperlipidemia who presents for chest pain with onset 12/4. Patient noted to have an NSTEMI     He was given a loading dose of 324 mg ASA and Brilinta 12/4 and started on heparin drip in ED.  Cardiology requesting anemia workup since the patient will need to continue dual antiplatelet therapy at home after placement of Drug eluding stent.    Heparin drip since discontinued. Currently still on Plavix and has to  remain on this per hospitalist        #Microcytic anemia- iron studies consistent with iron deficiency anemia.  No overt GI bleeding.  Hemoglobin stable, hgb upon arrival 9.5->8.2-> 9.1 today, No previous labs available for comparison  Initially hypertensive, currently HDS  #Acute NSTEMI -90% occlusion of the RCA on cardiac cath 12/5, needs stenting  # History of CAD with multiple prior PCI- has already been on ASA and Plavix for 10+ years        Plan  - EGD and colonoscopy tomorrow  -trend hgb and hct  -monitor consistency and color of stool. Monitor for signs of overt GI bleeding  -tranfuse for hgb < 8.0  -avoid NSAIDs                Plan discussed with Dr. Bragg.  GI will continue to follow  Total of _50___spent on visit and overall professional care of the patient.    (Electronically signed byCON Stevenson on 12/5/2023 at 1:20 PM)      Inpatient consult to Gastroenterology  Consult performed by: CON Stevenson  Consult ordered by: Matt Del Valle MD

## 2023-12-05 NOTE — CONSULTS
Social Work Note    Attempted to meet with pt x2 today. Pt has been off the floor for medical procedure. Pt is listed as uninsured. SW to follow up to confirm insurance needs and provide resource information.    

## 2023-12-05 NOTE — PROGRESS NOTES
Patient is stable status post C under the care of Dr. Lomas.  Discussed results of procedure with patient.  Pictures provided.  Findings of the LHC revealed 90% stenosis in the proximal RCA, patent previous stent in the RCA and LAD.  Mild disease elsewhere and an LVEF of 75%.  Patient did not undergo PCI of the RCA today due to his anemia with hemoglobin of 9.8 and hypochromic microcytic indices on admission.  Repeat hemoglobin this afternoon reveals a hemoglobin of 8.2.  Will hold aspirin at this time.  Continue Plavix for now.  Dr. Lomas is recommending patient be evaluated for possible GI bleed/anemia workup prior to performing PCI of the RCA.  Hospitalist service notified of recommendations.  GI service consulted.  Further recommendations pending clinical course.  Continue to monitor on telemetry.  All questions answered.  Patient verbalized understanding.

## 2023-12-05 NOTE — POST-PROCEDURE NOTE
Physician Transition of Care Summary  Invasive Cardiovascular Lab    Procedure Date: 12/5/2023  Attending:    Beverly Lomas - Primary  Resident/Fellow/Other Assistant: Surgeon(s) and Role:    Pre Procedure Diagnosis:   CAD, NSTEMI    Post Procedure Diagnosis:   90% proximal large dominant RCA, patent stent of mid RCA, patent stent of proximal LAD, left ventricular ejection fraction 75%, recommend PCI of RCA depending on results of CBC that was drawn stat in the Cath Lab, hemoglobin was 9.8 with hypochromic microcytic indices on admission    Complications:   None    Stents/Implants:   None    Anticoagulation/Antiplatelet Plan:   As before    Estimated Blood Loss:   0 mL    Electronically signed by: Mac Lomsa MD, 12/5/2023 11:59 AM    Anesthesia: Moderate                            anesthesia Staff: None

## 2023-12-05 NOTE — PROGRESS NOTES
"PROGRESS NOTE    ASSESSMENT AND PLAN:    Non-ST elevation MI  Coronary artery disease status post PCI  Hypertensive urgency  -Presented to the emergency room with symptoms of angina.  -Underwent a cardiac cath and had a 90% stenosis to RCA.  -Given his anemia, plan for evaluation by GI prior to placement of drug-eluting stent  -Aspirin currently held, continue Plavix.  -Currently chest pain-free    Anemia  -Currently has no active bleeding.  -Seen by GI, advised continuation of trending hemoglobin hematocrit.  -Mildly low, TIBC within normal range.  Pending ferritin.        SUBJECTIVE:   Admit Date: 12/4/2023    Interval History: Feels okay, no active complaints.       OBJECTIVE:   Vitals: /63 (BP Location: Right arm, Patient Position: Lying)   Pulse 72   Temp 36.8 °C (98.2 °F) (Temporal)   Resp 16   Ht 1.778 m (5' 10\")   Wt 83.9 kg (184 lb 15.5 oz)   SpO2 94%   BMI 26.54 kg/m²    Wt Readings from Last 3 Encounters:   12/04/23 83.9 kg (184 lb 15.5 oz)   03/01/23 87.6 kg (193 lb 3 oz)   03/02/22 87.7 kg (193 lb 5 oz)      24HR INTAKE/OUTPUT:  No intake or output data in the 24 hours ending 12/05/23 1051    PHYSICAL EXAM:   GENERAL: Laying in bed, does not appear to be in any distress.   HEENT: HEAD: Normocephalic atraumatic.  Neck: Supple.  Eyes: Pupils are reactive to direct light.   CVS: S1, S2 heard. Regular rate and rhythm  LUNGS: Clear to auscultate bilaterally. No wheezing or rhonchi appreciated.  ABDOMEN: Soft, nontender to palpate. Positive bowel sounds. No guarding or rebound appreciated.  NEUROLOGICAL: No focal neurological deficits appreciated. Cranial nerves are grossly intact.  EXTREMITIES: No edema appreciated. R groin site intact.   SKIN:  Grossly intact, warm and dry.      LABS/IMAGING AND MEDICATIONS:   Scheduled Meds:aspirin, 81 mg, oral, Daily  atorvastatin, 40 mg, oral, Daily  clopidogrel, 75 mg, oral, Daily  metoprolol tartrate, 25 mg, oral, BID  nicotine, 1 patch, transdermal, " "Daily      PRN Meds:PRN medications: acetaminophen **OR** acetaminophen **OR** acetaminophen, heparin, nitroglycerin, polyethylene glycol    No lab exists for component: \"CBC\"   No lab exists for component: \"CMP\"   No lab exists for component: \"TROPONIN\"  Results from last 7 days   Lab Units 12/04/23  1431   BNP pg/mL 30     Results from last 7 days   Lab Units 12/04/23  1431   INR  1.1     No lab exists for component: \"LIPIDS\"       No lab exists for component: \"URINALYSIS\"          BMP:  Results from last 7 days   Lab Units 12/04/23  1431   SODIUM mmol/L 138   POTASSIUM mmol/L 3.6   CHLORIDE mmol/L 107   CO2 mmol/L 22   BUN mg/dL 20   CREATININE mg/dL 1.40*       CBC:  Results from last 7 days   Lab Units 12/04/23  1431   WBC AUTO x10*3/uL 7.6   RBC AUTO x10*6/uL 4.17*   HEMOGLOBIN g/dL 9.5*   HEMATOCRIT % 32.6*   MCV fL 78*   MCH pg 22.8*   MCHC g/dL 29.1*   RDW % 16.3*   PLATELETS AUTO x10*3/uL 204       Cardiac Enzymes:   Results from last 7 days   Lab Units 12/04/23  2209 12/04/23  1613 12/04/23  1431   TROPHS ng/L 477* 273* 223*         Hepatic Function Panel:  Results from last 7 days   Lab Units 12/04/23  1431   ALK PHOS U/L 76   ALT U/L 23   AST U/L 17   PROTEIN TOTAL g/dL 6.8   BILIRUBIN TOTAL mg/dL 0.4       Magnesium:  Results from last 7 days   Lab Units 12/04/23  1431   MAGNESIUM mg/dL 1.84       Pro-BNP:  No results found for: \"PROBNP\"    INR:  Results from last 7 days   Lab Units 12/04/23  1431   PROTIME seconds 12.2   INR  1.1       TSH:  No results found for: \"TSH\"    Lipid Profile:        No lab exists for component: \"LABVLDL\"    HgbA1C:        Lactate Level:  No lab exists for component: \"LACTA\"    CMP:  Results from last 7 days   Lab Units 12/04/23  1431   SODIUM mmol/L 138   POTASSIUM mmol/L 3.6   CHLORIDE mmol/L 107   CO2 mmol/L 22   BUN mg/dL 20   CREATININE mg/dL 1.40*   GLUCOSE mg/dL 129*   CALCIUM mg/dL 9.4   PROTEIN TOTAL g/dL 6.8   BILIRUBIN TOTAL mg/dL 0.4   ALK PHOS U/L 76   AST U/L " 17   ALT U/L 23

## 2023-12-06 NOTE — PROGRESS NOTES
12/06/23 0941   Discharge Planning   Living Arrangements Spouse/significant other   Support Systems Spouse/significant other   Assistance Needed none PTA   Type of Residence Private residence   Number of Stairs to Enter Residence 2   Number of Stairs Within Residence 12   Do you have animals or pets at home? Yes   Home or Post Acute Services None   Patient expects to be discharged to: HOME   Does the patient need discharge transport arranged? No   Financial Resource Strain   How hard is it for you to pay for the very basics like food, housing, medical care, and heating? Not hard   Housing Stability   In the last 12 months, was there a time when you were not able to pay the mortgage or rent on time? N   In the last 12 months, how many places have you lived? 1   In the last 12 months, was there a time when you did not have a steady place to sleep or slept in a shelter (including now)? N   Transportation Needs   In the past 12 months, has lack of transportation kept you from medical appointments or from getting medications? no   In the past 12 months, has lack of transportation kept you from meetings, work, or from getting things needed for daily living? No     Admitted for NSTEMI. Went for [facility] but was unable to have stent placed d/t possible GI bleed. GI consulted.    Listed as Self pay, SW aware. Plans for HOME no needs.

## 2023-12-06 NOTE — PROGRESS NOTES
[facility] Progress Note           Rounding LUANN/Cardiologist:  Aditya Sanchez DO, Dr. Aditya Sanchez  Primary Cardiologist: Dr. Mac Lomas    Date:  12/6/2023  Patient:  Ranjit Zurita  YOB: 1960  MRN:  [id number]   Admit Date:  12/4/2023      SUBJECTIVE:    Ranjit Zurita was seen and examined today at bedside.     He denies any chest pain or shortness of breath.     LHC showed lesion in the RCA however concern with GI bleed so no stent was placed    No active bleeding.  Hemoglobin stable        VITALS:     Vitals:    12/05/23 1858 12/06/23 0430 12/06/23 0742 12/06/23 0815   BP: 134/64 142/63 131/63 154/70   BP Location:   Right arm    Patient Position: Sitting  Lying    Pulse: 82 75 81 85   Resp: 19 19 20    Temp: 36.9 °C (98.4 °F) 36.7 °C (98.1 °F) 37.1 °C (98.8 °F)    TempSrc:   Temporal    SpO2: 94% 94% 92% 94%   Weight:       Height:           Intake/Output Summary (Last 24 hours) at 12/6/2023 1046  Last data filed at 12/6/2023 0742  Gross per 24 hour   Intake 1188.17 ml   Output 376 ml   Net 812.17 ml       Wt Readings from Last 4 Encounters:   12/04/23 83.9 kg (184 lb 15.5 oz)   03/01/23 87.6 kg (193 lb 3 oz)   03/02/22 87.7 kg (193 lb 5 oz)       CURRENT HOSPITAL MEDICATIONS:   [Held by provider] aspirin, 81 mg, oral, Daily  atorvastatin, 40 mg, oral, Daily  clopidogrel, 75 mg, oral, Daily  iron sucrose, 100 mg, intravenous, Once  metoprolol tartrate, 25 mg, oral, BID  nicotine, 1 patch, transdermal, Daily         Current Outpatient Medications   Medication Instructions    amLODIPine (Norvasc) 10 mg tablet TAKE 1 TABLET MY MOUTH DAILY AS DIRECTED.    amLODIPine (Norvasc) 10 mg tablet Take 1 tablet (10 mg) by mouth once daily.    aspirin 81 mg EC tablet 1 tablet, oral, Daily    atorvastatin (LIPITOR) 40 mg, oral, Daily    clopidogrel (PLAVIX) 75 mg, oral, Daily    metoprolol tartrate (LOPRESSOR) 25 mg, oral, 2 times daily    multivitamin tablet 1 tablet, oral, Daily     nitroglycerin (Nitrostat) 0.4 mg SL tablet Place 1 tablet (0.4 mg) under the tongue every 5 minutes if needed for chest pain (up to 3 doses).        PHYSICAL EXAMINATION:   GENERAL:  Well developed, well nourished, in no acute distress.  CHEST:  Symmetric and nontender.  NEURO/PSYCH:  Alert and oriented times three with approppriate behavior and responses.  NECK:  Supple, no JVD, no bruit.  LUNGS:  Clear to auscultation bilaterally, normal respiratory effort.  HEART:  Rate and rhythm regular with no evident murmur, no gallop appreciated.        There are no rubs, clicks or heaves.  EXTREMITIES:  Warm with good color, no clubbing or cyanosis.  There is no edema noted.  PERIPHERAL VASCULAR:  Pulses present and equally palpable; 2+ throughout.      LAB DATA:     CBC:   Results from last 7 days   Lab Units 12/06/23  0611 12/05/23  1219 12/04/23  1431   WBC AUTO x10*3/uL 6.4 6.0 7.6   RBC AUTO x10*6/uL 3.98* 3.56* 4.17*   HEMOGLOBIN g/dL 9.1* 8.2* 9.5*   HEMATOCRIT % 30.6* 27.6* 32.6*   MCV fL 77* 78* 78*   MCH pg 22.9* 23.0* 22.8*   MCHC g/dL 29.7* 29.7* 29.1*   RDW % 16.4* 16.5* 16.3*   PLATELETS AUTO x10*3/uL 203 178 204     CMP:    Results from last 7 days   Lab Units 12/06/23  0611 12/05/23  1237 12/04/23  1431   SODIUM mmol/L 136 139 138   POTASSIUM mmol/L 3.9 3.9 3.6   CHLORIDE mmol/L 110* 108* 107   CO2 mmol/L 22 24 22   BUN mg/dL 15 15 20   CREATININE mg/dL 1.27 1.18 1.40*   GLUCOSE mg/dL 116* 83 129*   PROTEIN TOTAL g/dL  --   --  6.8   CALCIUM mg/dL 9.0 8.9 9.4   BILIRUBIN TOTAL mg/dL  --   --  0.4   ALK PHOS U/L  --   --  76   AST U/L  --   --  17   ALT U/L  --   --  23     BMP:    Results from last 7 days   Lab Units 12/06/23  0611 12/05/23  1237 12/04/23  1431   SODIUM mmol/L 136 139 138   POTASSIUM mmol/L 3.9 3.9 3.6   CHLORIDE mmol/L 110* 108* 107   CO2 mmol/L 22 24 22   BUN mg/dL 15 15 20   CREATININE mg/dL 1.27 1.18 1.40*   CALCIUM mg/dL 9.0 8.9 9.4   GLUCOSE mg/dL 116* 83 129*      Magnesium:  Results from last 7 days   Lab Units 12/06/23  0611 12/04/23  1431   MAGNESIUM mg/dL 2.03 1.84     Troponin:    Results from last 7 days   Lab Units 12/04/23  2209 12/04/23  1613 12/04/23  1431   TROPHS ng/L 477* 273* 223*     BNP:   Results from last 7 days   Lab Units 12/04/23  1431   BNP pg/mL 30     Lipid Panel:  Results from last 7 days   Lab Units 12/05/23  1237   HDL mg/dL 31.0   CHOLESTEROL/HDL RATIO  3.7   VLDL mg/dL 24   TRIGLYCERIDES mg/dL 122   NON HDL CHOL. mg/dL 83        DIAGNOSTIC TESTING:   @No results found for this or any previous visit.    Vascular US Carotid Artery Duplex Bilateral    Result Date: 12/5/2023  Interpreted By:  Mio Dobson, STUDY: [facility] US CAROTID ARTERY DUPLEX BILATERAL;  12/5/2023 3:20 pm   INDICATION: Signs/Symptoms:remove CVA, stenosis.   COMPARISON: Duplex carotid ultrasound 16 February 2021   ACCESSION NUMBER(S): [id number]   ORDERING CLINICIAN: YAMILA GILLETTE   TECHNIQUE: Vascular ultrasound of the extracranial carotid system was performed bilaterally.  Gray scale, color Doppler and spectral Doppler waveform analysis was performed   FINDINGS: RIGHT: On grayscale US, intimal thickening throughout the common carotid; two adjacent moderate calcified atherosclerotic narrowings in the bulb without associated elevated velocity; another calcified atherosclerotic stenosis, mild-to-moderate in the most proximal cervical ICA, again without associated elevated velocity   PEAK SYSTOLIC VELOCITY TABLE, IN CM / SEC: CCA 71 ICA 97    ICA/CCA PSV Ratio: 1.4   VERTEBRAL ARTERY: Patent, with appropriate antegrade direction of flow, with normal waveform   LEFT: On grayscale US, intimal thickening throughout the common carotid; moderate to severe calcified atherosclerotic narrowing of the distal common carotid; mixed calcified and noncalcified moderate atherosclerotic narrowing at the bulb without associated elevated velocity; patent cervical ICA stent   PEAK SYSTOLIC  VELOCITY TABLE, IN CM / SEC: CCA 93  ECA 60   ICA/CCA PSV Ratio: 1.6   VERTEBRAL ARTERY: Patent, with appropriate antegrade direction of flow, with normal waveform       ATHEROSCLEROTIC NARROWINGS IN BILATERAL CAROTID BULBS AND PROXIMAL CERVICAL INTERNAL CAROTID ARTERIES, MORE SEVERE ON THE LEFT, NONE OF WHICH WITH PEAK SYSTOLIC VELOCITY OVER 180 CM/SEC ON TODAY'S EXAM   ESTIMATED CERVICAL RIGHT INTERNAL CAROTID ARTERY STENOSIS: 0-49%, BASED ON PLAQUE / MORPHOLOGIC STENOSIS ON STATIC / GRAYSCALE ULTRASOUND, NOTING NO ELEVATED PEAK SYSTOLIC VELOCITIES IN THE CERVICAL ICA   ESTIMATED CERVICAL LEFT INTERNAL CAROTID ARTERY STENOSIS: 0-49%, BASED ON PLAQUE / MORPHOLOGIC STENOSIS ON STATIC / GRAYSCALE ULTRASOUND, NOTING NO ELEVATED PEAK SYSTOLIC VELOCITIES IN THE CERVICAL ICA   The velocity criteria are extrapolated from diameter data as defined by the Society of Radiologists in Ultrasound Consensus Conference Radiology 2003; 229;340-346   MACRO: None   Signed by: Mio Dobson 12/5/2023 3:30 PM Dictation workstation:   [id number]        [place] [id number]   Tel [phone] Fax [phone]     TRANSTHORACIC ECHOCARDIOGRAM REPORT        Patient Name:      EDNA VELASCO    Reading Physician:    [id number] Aditya Sanchez DO  Study Date:        12/5/2023           Ordering Provider:    [id number] YAMILA GILLETTE  MRN/PID:           [id number]            Fellow:  Accession#:        [id number]        Nurse:  Date of Birth/Age: 1960 / 63 years Sonographer:          Светлана Anguiano RDCS  Gender:            M                   Additional Staff:  Height:            177.80 cm           Admit Date:           12/4/2023  Weight:            83.46 kg            Admission Status:     Inpatient -  Routine  BSA:               2.01 m2             Department Location:  [facility] Echo                                                               Lab  Blood Pressure: 144 /63 mmHg     Study Type:    TRANSTHORACIC ECHO (TTE) COMPLETE  Diagnosis/ICD: Non ST elevation (NSTEMI) myocardial infarction-I21.4  Indication:    NSTEMI  CPT Codes:     Echo Complete w Full [id number]     Patient History:  Smoker:            Current.  Pertinent History: CAD, HTN and Hyperlipidemia.     Study Detail: The following Echo studies were performed: 2D, color flow, M-Mode                and Doppler. Technically challenging study due to body habitus.        PHYSICIAN INTERPRETATION:  Left Ventricle: Left ventricular systolic function is normal, with an estimated ejection fraction of 60%. There are no regional wall motion abnormalities. The left ventricular cavity size is normal. Spectral Doppler shows a normal pattern of left ventricular diastolic filling.  LV Wall Scoring:  All segments are normal.     Left Atrium: The left atrium is normal in size.  Right Ventricle: The right ventricle is normal in size. There is normal right ventricular global systolic function.  Right Atrium: The right atrium is normal in size.  Aortic Valve: The aortic valve appears structurally normal. The aortic valve appears tricuspid. There is mild aortic valve cusp calcification. There is no evidence of aortic valve stenosis.  There is no evidence of aortic valve regurgitation. The peak instantaneous gradient of the aortic valve is 15.5 mmHg. The mean gradient of the aortic valve is 9.0 mmHg.  Mitral Valve: The mitral valve is normal in structure. There is no evidence of mitral valve stenosis. There is normal mitral valve leaflet mobility. There is trace mitral valve regurgitation.  Tricuspid Valve: The tricuspid valve is structurally normal. There is normal tricuspid valve leaflet mobility. There is trace tricuspid regurgitation.  Pulmonic Valve: The  pulmonic valve is structurally normal. There is no indication of pulmonic valve regurgitation.  Pericardium: There is no pericardial effusion noted.  Aorta: The aortic root is normal.  Pulmonary Artery: The main pulmonary artery is normal in size, and position, with normal bifurcation into the left and right pulmonary arteries. The tricuspid regurgitant velocity is 2.80 m/s, and with an estimated right atrial pressure of 3 mmHg, the estimated pulmonary artery pressure is mildly elevated with the RVSP at 34.4 mmHg.  Systemic Veins: The inferior vena cava appears to be of normal size.  In comparison to the previous echocardiogram(s): There are no prior studies on this patient for comparison purposes.        CONCLUSIONS:   1. Left ventricular systolic function is normal with a 60% estimated ejection fraction.   2. There is no evidence of mitral valve stenosis.   3. Trace mitral valve regurgitation.   4. Trace tricuspid regurgitation is visualized.   5. Aortic valve stenosis is not present.   6. The main pulmonary artery is normal in size, and position, with normal bifurcation into the left and right pulmonary arteries.     RECOMMENDATIONS:  Technically suboptimal and limited study, therefore accuracy of above interpretation could be substantially diminished. Clinical correlation is advised. Consider additional imaging modalities if clinically indicated.    Cardiac Cath Post Procedure Notes:  Post Procedure Diagnosis: Single vessel disease.  Blood Loss:               Estimated blood loss during the procedure was 0 mls.  Specimens Removed:        Number of specimen(s) removed: none.     ____________________________________________________________________________________  CONCLUSIONS:   1. Mid Left Main: 10% stenosis.   2. Left Anterior Descending Artery: contains patent previously placed stents.   3. Proximal and mid LAD Lesion: The percent stenosis is 30%.   4. Proximal and mid CX Lesion: The percent stenosis is  10-30%.   5. Right Coronary Artery: contains patent previously placed stents.   6. Proximal RCA Lesion: The percent stenosis is 90%.   7. Prox PLVB RCA Lesion: The percent stenosis is 50 %.   8. The Left Ventricular Ejection Fraction is 75%.     ICD 10 Codes:  Non ST elevation (NSTEMI) myocardial infarction-I21.4     CPT Codes:  Left Heart Cath (visualization of coronaries) and LV-[id number]; Moderate Sedation Services 1st additional 15 minutes patient >5 years-[id number]     [id number] Mac Mathew MD  Performing Physician  Electronically signed by [id number] Mac Mathew MD on 12/5/2023 at 12:17:02    ECG 12 lead    Result Date: 12/5/2023  Normal sinus rhythm Right bundle branch block T wave abnormality, consider inferior ischemia Abnormal ECG When compared with ECG of 04-DEC-2023 16:19, T wave inversion now evident in Anterior leads    XR chest 1 view    Result Date: 12/4/2023  Interpreted By:  Bam Manzanares, STUDY: XR CHEST 1 VIEW;  12/4/2023 3:51 pm   INDICATION: Signs/Symptoms:Chest Pain.   COMPARISON: 10/27/2017   ACCESSION NUMBER(S): [id number]   ORDERING CLINICIAN: NERI BERMUDEZ   FINDINGS:   The cardiac silhouette is unremarkable. Costophrenic angles are sharp. Lungs are clear. The trachea is midline. There is no pneumothorax. No acute osseous abnormality is seen.       1.  No active cardiopulmonary process.     Signed by: Bam Manzanares 12/4/2023 3:58 PM Dictation workstation:   [id number]       Vascular US Carotid Artery Duplex Bilateral   Final Result   ATHEROSCLEROTIC NARROWINGS IN BILATERAL CAROTID BULBS AND PROXIMAL   CERVICAL INTERNAL CAROTID ARTERIES, MORE SEVERE ON THE LEFT, NONE OF   WHICH WITH PEAK SYSTOLIC VELOCITY OVER 180 CM/SEC ON TODAY'S EXAM        ESTIMATED CERVICAL RIGHT INTERNAL CAROTID ARTERY STENOSIS: 0-49%,   BASED ON PLAQUE / MORPHOLOGIC STENOSIS ON STATIC / GRAYSCALE   ULTRASOUND, NOTING NO ELEVATED PEAK SYSTOLIC VELOCITIES IN THE   CERVICAL ICA        ESTIMATED CERVICAL LEFT  INTERNAL CAROTID ARTERY STENOSIS: 0-49%,   BASED ON PLAQUE / MORPHOLOGIC STENOSIS ON STATIC / GRAYSCALE   ULTRASOUND, NOTING NO ELEVATED PEAK SYSTOLIC VELOCITIES IN THE   CERVICAL ICA        The velocity criteria are extrapolated from diameter data as defined   by the Society of Radiologists in Ultrasound Consensus Conference   Radiology 2003; 229;340-346        MACRO:   None        Signed by: Mio Dobson 12/5/2023 3:30 PM   Dictation workstation:   [id number]      Transthoracic Echo (TTE) Complete   Final Result      Cardiac catheterization - coronary   Final Result      XR chest 1 view   Final Result   1.  No active cardiopulmonary process.             Signed by: Bam Manzanares 12/4/2023 3:58 PM   Dictation workstation:   [id number]            RADIOLOGY:     Vascular US Carotid Artery Duplex Bilateral   Final Result   ATHEROSCLEROTIC NARROWINGS IN BILATERAL CAROTID BULBS AND PROXIMAL   CERVICAL INTERNAL CAROTID ARTERIES, MORE SEVERE ON THE LEFT, NONE OF   WHICH WITH PEAK SYSTOLIC VELOCITY OVER 180 CM/SEC ON TODAY'S EXAM        ESTIMATED CERVICAL RIGHT INTERNAL CAROTID ARTERY STENOSIS: 0-49%,   BASED ON PLAQUE / MORPHOLOGIC STENOSIS ON STATIC / GRAYSCALE   ULTRASOUND, NOTING NO ELEVATED PEAK SYSTOLIC VELOCITIES IN THE   CERVICAL ICA        ESTIMATED CERVICAL LEFT INTERNAL CAROTID ARTERY STENOSIS: 0-49%,   BASED ON PLAQUE / MORPHOLOGIC STENOSIS ON STATIC / GRAYSCALE   ULTRASOUND, NOTING NO ELEVATED PEAK SYSTOLIC VELOCITIES IN THE   CERVICAL ICA        The velocity criteria are extrapolated from diameter data as defined   by the Society of Radiologists in Ultrasound Consensus Conference   Radiology 2003; 229;340-346        MACRO:   None        Signed by: Mio Dobson 12/5/2023 3:30 PM   Dictation workstation:   [id number]      Transthoracic Echo (TTE) Complete   Final Result      Cardiac catheterization - coronary   Final Result      XR chest 1 view   Final Result   1.  No active cardiopulmonary process.              Signed by: Bam Manzanares 12/4/2023 3:58 PM   Dictation workstation:   [id number]          PROBLEM LIST     Patient Active Problem List   Diagnosis    Abnormal results of cardiovascular function studies    Bruising    CAD S/P percutaneous coronary angioplasty    Carotid atherosclerosis    Carotid bruit    Carotid stent occlusion (CMS/HCC)    Chest pain    Chronic kidney disease, stage 3 (CMS/HCC)    Current smoker    Dizziness    Dyslipidemia    Essential hypertension    History of acute myocardial infarction    History of CVA (cerebrovascular accident)    History of transient ischemic attack    Intermittent claudication (CMS/HCC)    Mixed hyperlipidemia    PAD (peripheral artery disease) (CMS/HCC)    Presence of stent in coronary artery    Slurred speech    NSTEMI (non-ST elevated myocardial infarction) (CMS/[facility])       ASSESSMENT:   63-year-old gentleman seen evaluate the bedside in conjunction with Gadiel Valdez RN, CNP in the telemetry unit.     Bedside examination evaluation interview were performed by me.     Chart was reviewed in detail discussed the patient at length including prior cardiovascular evaluations current symptomatology and findings and need for catheterization.     IMPRESSION:   1. Coronary artery disease, active angina with findings consistent with non-STEMI  2. Non-ST-segment elevation myocardial infarction in 2017.  3. Multivessel percutaneous coronary intervention including unprotected left main stenting with drug-eluting stent, October 30, 2017.  4. Carotid artery disease, remote left internal carotid artery stenting, in-stent restenosis with subsequent balloon angioplasty with good results.  5. Moderate atherosclerotic disease and proximal left subclavian artery.  6. Remote cerebrovascular accident with no significant residual disease.  7. Mixed hyperlipidemia.  8. Essential hypertension.  9. Tobacco abuse  10. Overweight.  11. Chronic kidney disease, stage III.        PLAN:    Recommendation:  Admit to medicine team, continue telemetry monitoring per unit protocol  Supplemental O2  Monitor electrolytes, keep potassium greater than 4 and magnesium greater than 2  Continue aspirin, statin and beta-blocker  Troponin elevation in the setting of acute chest pain similar to prior MI.  N.p.o.  Went for cath yesterday however there was concern for possible GI bleed with drop in hemoglobin.  Hemoglobin stable today.  No active signs of bleeding.  Defer to GI when appropriate to stent RCA.  If deemed okay by GI we will plan to stent on this admission.  Lipid panel  Gentle hydration with known CKD stage III  Offer nicotine patch for smoking cessation  Further recommendations post [facility]    12/6  63-year-old gentleman seen evaluate the bedside in conjunction with Gadiel Valdez RN, CNP in the telemetry unit.     Bedside examination evaluation interview were performed by me.     Chart was reviewed in detail discussed the patient at length including prior cardiovascular evaluations current symptomatology and findings and need for catheterization.         IMPRESSION:   1. Coronary artery disease, active angina with findings consistent with non-STEMI  2. Non-ST-segment elevation myocardial infarction in 2017.  3. Multivessel percutaneous coronary intervention including unprotected left main stenting with drug-eluting stent, October 30, 2017.  4. Carotid artery disease, remote left internal carotid artery stenting, in-stent restenosis with subsequent balloon angioplasty with good results.  5. Moderate atherosclerotic disease and proximal left subclavian artery.  6. Remote cerebrovascular accident with no significant residual disease.  7. Mixed hyperlipidemia.  8. Essential hypertension.  9. Tobacco abuse  10. Overweight.  11. Chronic kidney disease, stage III.    Recommendation:  Admit to medicine team, continue telemetry monitoring per unit protocol  Supplemental O2  Monitor electrolytes, keep potassium  greater than 4 and magnesium greater than 2  Continue aspirin, statin and beta-blocker  Troponin elevation in the setting of acute chest pain similar to prior MI.  N.p.o.  Went for cath yesterday however there was concern for possible GI bleed with drop in hemoglobin.  Hemoglobin stable today.  No active signs of bleeding.  Defer to GI when appropriate to stent RCA.  If deemed okay by GI we will plan to stent on this admission.  Lipid panel  Gentle hydration with known CKD stage III  Offer nicotine patch for smoking cessation  Further recommendations post [facility]    Discussion:  Patient underwent heart cath yesterday.  Patient does have a discrete lesion in the right coronary artery which would benefit from intervention but because of low hemoglobin hematocrit Dr. Carreon's decision was to have GI see him first to be sure that it would be reasonable to continue on dual antiplatelet medications.  We are awaiting their input.  In the meantime we will continue his other meds.  Potentially patient can have this done prior to his departure from the hospital.     Aditya Sanchez DO,[facility]     Gadiel Valdez [facility]  Adult Gerontology Acute Care Nurse Practitioner  [facility]  [phone]     Of note, this documentation is completed using the Dragon Dictation system (voice recognition software). There may be spelling and/or grammatical errors that were not corrected prior to final submission.       Please do not hesitate to call with questions.  Electronically signed by Aditya Sanchez DO, on 12/6/2023 at 10:46 AM

## 2023-12-06 NOTE — PROGRESS NOTES
"PROGRESS NOTE    ASSESSMENT AND PLAN:    Non-ST elevation MI  Coronary artery disease status post PCI  Hypertensive urgency  -Presented to the emergency room with symptoms of angina.  -Underwent a cardiac cath and had a 90% stenosis to RCA.  -Given his anemia, plan for evaluation by GI prior to placement of drug-eluting stent  -Aspirin currently held, continue Plavix.  -Currently chest pain-free     Iron deficiency anemia  -Currently has no active bleeding.  -Seen by GI, advised continuation of trending hemoglobin hematocrit.  -Plan for IV iron today  -Seen by GI, plan for colonoscopy, he does have a history of upper GI bleed.           SUBJECTIVE:   Admit Date: 12/4/2023    Interval History: Feels okay, had an episode of chest pain this morning which resolved on its own      OBJECTIVE:   Vitals: /68   Pulse 73   Temp 36.4 °C (97.5 °F)   Resp 16   Ht 1.778 m (5' 10\")   Wt 83.9 kg (184 lb 15.5 oz)   SpO2 94%   BMI 26.54 kg/m²    Wt Readings from Last 3 Encounters:   12/04/23 83.9 kg (184 lb 15.5 oz)   03/01/23 87.6 kg (193 lb 3 oz)   03/02/22 87.7 kg (193 lb 5 oz)      24HR INTAKE/OUTPUT:    Intake/Output Summary (Last 24 hours) at 12/6/2023 1545  Last data filed at 12/6/2023 1300  Gross per 24 hour   Intake 1200 ml   Output --   Net 1200 ml       PHYSICAL EXAM:   GENERAL: Laying in bed, does not appear to be in any distress.   HEENT: HEAD: Normocephalic atraumatic.  Neck: Supple.  Eyes: Pupils are reactive to direct light.   CVS: S1, S2 heard. Regular rate and rhythm  LUNGS: Clear to auscultate bilaterally. No wheezing or rhonchi appreciated.  ABDOMEN: Soft, nontender to palpate. Positive bowel sounds. No guarding or rebound appreciated.  NEUROLOGICAL: No focal neurological deficits appreciated. Cranial nerves are grossly intact.  EXTREMITIES: No edema appreciated.  SKIN:  Grossly intact, warm and dry.      LABS/IMAGING AND MEDICATIONS:   Scheduled Meds:[Held by provider] aspirin, 81 mg, oral, " "Daily  atorvastatin, 40 mg, oral, Daily  clopidogrel, 75 mg, oral, Daily  metoprolol tartrate, 25 mg, oral, BID  nicotine, 1 patch, transdermal, Daily  polyethylene glycol-electrolytes, 4,000 mL, oral, Once      PRN Meds:PRN medications: acetaminophen **OR** acetaminophen **OR** acetaminophen, nitroglycerin, ondansetron, polyethylene glycol, polyethylene glycol    No lab exists for component: \"CBC\"   No lab exists for component: \"CMP\"   No lab exists for component: \"TROPONIN\"  Results from last 7 days   Lab Units 12/04/23  1431   BNP pg/mL 30     Results from last 7 days   Lab Units 12/04/23  1431   INR  1.1         BMP:  Results from last 7 days   Lab Units 12/06/23  0611 12/05/23  1237 12/04/23  1431   SODIUM mmol/L 136 139 138   POTASSIUM mmol/L 3.9 3.9 3.6   CHLORIDE mmol/L 110* 108* 107   CO2 mmol/L 22 24 22   BUN mg/dL 15 15 20   CREATININE mg/dL 1.27 1.18 1.40*       CBC:  Results from last 7 days   Lab Units 12/06/23  0611 12/05/23  1219 12/04/23  1431   WBC AUTO x10*3/uL 6.4 6.0 7.6   RBC AUTO x10*6/uL 3.98* 3.56* 4.17*   HEMOGLOBIN g/dL 9.1* 8.2* 9.5*   HEMATOCRIT % 30.6* 27.6* 32.6*   MCV fL 77* 78* 78*   MCH pg 22.9* 23.0* 22.8*   MCHC g/dL 29.7* 29.7* 29.1*   RDW % 16.4* 16.5* 16.3*   PLATELETS AUTO x10*3/uL 203 178 204       Cardiac Enzymes:   Results from last 7 days   Lab Units 12/04/23  2209 12/04/23  1613 12/04/23  1431   TROPHS ng/L 477* 273* 223*         Hepatic Function Panel:  Results from last 7 days   Lab Units 12/04/23  1431   ALK PHOS U/L 76   ALT U/L 23   AST U/L 17   PROTEIN TOTAL g/dL 6.8   BILIRUBIN TOTAL mg/dL 0.4       Magnesium:  Results from last 7 days   Lab Units 12/06/23  0611   MAGNESIUM mg/dL 2.03       Pro-BNP:  No results found for: \"PROBNP\"    INR:  Results from last 7 days   Lab Units 12/04/23  1431   PROTIME seconds 12.2   INR  1.1       TSH:  No results found for: \"TSH\"    Lipid Profile:  Results from last 7 days   Lab Units 12/05/23  1237   TRIGLYCERIDES mg/dL 122   HDL " "mg/dL 31.0   LDL CALC mg/dL 59       HgbA1C:        Lactate Level:  No lab exists for component: \"LACTA\"    CMP:  Results from last 7 days   Lab Units 12/06/23  0611 12/05/23  1237 12/04/23  1431   SODIUM mmol/L 136 139 138   POTASSIUM mmol/L 3.9 3.9 3.6   CHLORIDE mmol/L 110* 108* 107   CO2 mmol/L 22 24 22   BUN mg/dL 15 15 20   CREATININE mg/dL 1.27 1.18 1.40*   GLUCOSE mg/dL 116* 83 129*   CALCIUM mg/dL 9.0 8.9 9.4   PROTEIN TOTAL g/dL  --   --  6.8   BILIRUBIN TOTAL mg/dL  --   --  0.4   ALK PHOS U/L  --   --  76   AST U/L  --   --  17   ALT U/L  --   --  23        "

## 2023-12-07 NOTE — CARE PLAN
Gastroenterology  Chart Update    Colonoscopy by Dr Bragg   The terminal ileum appeared normal.;  Polypoid mass (traversable) in the ileocecal valve; no bleeding was identified; performed cold forceps biopsy  The mass appeared villous and soft, and appeared on the backside of the ICV.  It was 40 mm.  8-10 biopsies taken.  Multiple small and large, extensive pancolonic diverticula; no bleeding was identified  Six sessile polyps measuring from 5 mm up to 10 mm in the transverse colon, descending colon and rectosigmoid  One 15 mm pedunculated polyp in the rectosigmoid 15 cm from the anal verge; performed cold forceps biopsy  None of the polyps were remove because the patient is currently taking DAPT.  Internal hemorrhoids observed during retroflexion; no bleeding was identified    EGD by Dr Bragg   The 2nd part of the duodenum appeared normal.  Mild abnormal mucosa  Performed forceps biopsies in the body of the stomach, incisura and antrum to rule out H. pylori  Small hiatal hernia  Schatzki ring in the GE junction  Single 5 mm x 7 mm mucosal nodule was visualized in the GE junction; performed cold forceps biopsy    PLAN  Await pathology results- if malignant refer to surgery; if benign he needs repeat colonoscopy for EMR off DAPT   Will need polypectomy at some point  Nothing activity bleeding  Placed on cardiac diet  Continue care per primary and cardiac teams    The GI/Liver consult service will sign off. Please call if there are any questions, concerns, or change of patient's GI condition. Thank you.     Patient should follow up with his/her primary GI provider. If they do not have a GI provider they can follow up with  Gastroenterology [phone]     Yancy Oseguera PA-C    
The patient's goals for the shift include no chest pain    The clinical goals for the shift include pt will have no signs of active bleeding    
12-Jun-2019 19:56

## 2023-12-07 NOTE — ANESTHESIA PREPROCEDURE EVALUATION
Patient: Ranjit Zurita    Procedure Information       Date/Time: 12/07/23 1540    Scheduled providers: Felix Olmos DO    Procedures:       EGD      COLONOSCOPY    Location: [facility]            Relevant Problems   Cardiovascular   (+) CAD S/P percutaneous coronary angioplasty   (+) Carotid atherosclerosis   (+) Carotid stent occlusion (CMS/HCC)   (+) Chest pain   (+) Essential hypertension   (+) Mixed hyperlipidemia   (+) NSTEMI (non-ST elevated myocardial infarction) (CMS/HCC)   (+) PAD (peripheral artery disease) (CMS/HCC)   (+) Presence of stent in coronary artery      /Renal   (+) Chronic kidney disease, stage 3 (CMS/HCC)      Neuro/Psych   (+) Carotid atherosclerosis   (+) Carotid stent occlusion (CMS/HCC)       Clinical information reviewed:                   NPO Detail:  No data recorded     Physical Exam    Airway  Mallampati: II     Cardiovascular - normal exam     Dental - normal exam     Pulmonary - normal exam     Abdominal - normal exam         Anesthesia Plan    ASA 3 - emergent     MAC     The patient is a current smoker.  Patient was previously instructed to abstain from smoking on day of procedure.  Patient did not smoke on day of procedure.    Anesthetic plan and risks discussed with patient.

## 2023-12-07 NOTE — ANESTHESIA PREPROCEDURE EVALUATION
Ranjit Zuirta is a 63 y.o. male here for:    EGD  Colonoscopy  With Dk Bragg MD  Iron deficiency anemia, unspecified iron deficiency anemia type  Iron deficiency anemia, unspecified iron deficiency anemia type    Relevant Problems   Cardiovascular  Echo 12/5/2023:  CONCLUSIONS:   1. Left ventricular systolic function is normal with a 60% estimated ejection fraction.   2. There is no evidence of mitral valve stenosis.   3. Trace mitral valve regurgitation.   4. Trace tricuspid regurgitation is visualized.   5. Aortic valve stenosis is not present.   6. The main pulmonary artery is normal in size, and position, with normal bifurcation into the left and right pulmonary arteries    Cardiac cath 12/5/2023:  CONCLUSIONS:   1. Mid Left Main: 10% stenosis.   2. Left Anterior Descending Artery: contains patent previously placed stents.   3. Proximal and mid LAD Lesion: The percent stenosis is 30%.   4. Proximal and mid CX Lesion: The percent stenosis is 10-30%.   5. Right Coronary Artery: contains patent previously placed stents.   6. Proximal RCA Lesion: The percent stenosis is 90%.   7. Prox PLVB RCA Lesion: The percent stenosis is 50 %.   8. The Left Ventricular Ejection Fraction is 75%.       (+) CAD S/P percutaneous coronary angioplasty   (+) Carotid atherosclerosis   (+) Carotid stent occlusion (CMS/HCC)   (+) Chest pain   (+) Essential hypertension   (+) Mixed hyperlipidemia   (+) NSTEMI (non-ST elevated myocardial infarction) (CMS/HCC)   (+) PAD (peripheral artery disease) (CMS/HCC)   (+) Presence of stent in coronary artery      /Renal   (+) Chronic kidney disease, stage 3 (CMS/HCC)      Neuro/Psych   (+) Carotid atherosclerosis   (+) Carotid stent occlusion (CMS/[facility])       Lab Results   Component Value Date    HGB 9.6 (L) 12/07/2023    HCT 32.3 (L) 12/07/2023    WBC 6.6 12/07/2023     12/07/2023     12/07/2023    K 4.0 12/07/2023     (H) 12/07/2023    CREATININE 1.32 (H) 12/07/2023     BUN 14 12/07/2023       Social History     Tobacco Use   Smoking Status Not on file   Smokeless Tobacco Not on file       Allergies   Allergen Reactions    Cyclobenzaprine Hives    Losartan Myalgia     Joint pain all over       Current Outpatient Medications   Medication Instructions    amLODIPine (Norvasc) 10 mg tablet TAKE 1 TABLET MY MOUTH DAILY AS DIRECTED.    amLODIPine (Norvasc) 10 mg tablet Take 1 tablet (10 mg) by mouth once daily.    aspirin 81 mg EC tablet 1 tablet, oral, Daily    atorvastatin (LIPITOR) 40 mg, oral, Daily    clopidogrel (PLAVIX) 75 mg, oral, Daily    metoprolol tartrate (LOPRESSOR) 25 mg, oral, 2 times daily    multivitamin tablet 1 tablet, oral, Daily    nitroglycerin (Nitrostat) 0.4 mg SL tablet Place 1 tablet (0.4 mg) under the tongue every 5 minutes if needed for chest pain (up to 3 doses).       Past Surgical History:   Procedure Laterality Date    CARDIAC CATHETERIZATION N/A 12/5/2023    Procedure: Left Heart Cath, With LV;  Surgeon: Mac Lomas MD;  Location: [facility];  Service: Cardiovascular;  Laterality: N/A;    OTHER SURGICAL HISTORY  05/28/2019    Coronary artery stent placement       No family history on file.    NPO Details:  No data recorded    Physical Exam    Airway  Mallampati: II  TM distance: >3 FB  Neck ROM: full     Cardiovascular   Rhythm: regular  Rate: normal     Dental - normal exam     Pulmonary   Breath sounds clear to auscultation     Abdominal            Anesthesia Plan    ASA 3     MAC     The patient is a current smoker.    intravenous induction   Anesthetic plan and risks discussed with patient.    Plan discussed with CRNA.

## 2023-12-07 NOTE — PROGRESS NOTES
On license of [facility] Heart Progress Note           Rounding LUANN/Cardiologist:  Aditya Sanchez DO, Dr. Aditya Sanchez  Primary Cardiologist: Dr. Mac Lomas    Date:  12/7/2023  Patient:  Ranjit Zurita  YOB: 1960  MRN:  [id number]   Admit Date:  12/4/2023      SUBJECTIVE:    Ranjit Zurita was seen and examined today at bedside.     Denies any chest pain or shortness of breath    Plan for EGD and colonoscopy today    PCI tomorrow        VITALS:     Vitals:    12/06/23 1940 12/07/23 0029 12/07/23 0319 12/07/23 0730   BP: 144/71 124/64 140/65 144/70   BP Location:    Right arm   Patient Position:    Lying   Pulse: 73 71 73 78   Resp: 16   16   Temp: 36.6 °C (97.9 °F) 37 °C (98.6 °F) 36.8 °C (98.2 °F) 36.3 °C (97.3 °F)   TempSrc:    Temporal   SpO2: 96% 93% 93% 93%   Weight:       Height:           Intake/Output Summary (Last 24 hours) at 12/7/2023 1016  Last data filed at 12/7/2023 0904  Gross per 24 hour   Intake 540 ml   Output --   Net 540 ml       Wt Readings from Last 4 Encounters:   12/04/23 83.9 kg (184 lb 15.5 oz)   03/01/23 87.6 kg (193 lb 3 oz)   03/02/22 87.7 kg (193 lb 5 oz)       CURRENT HOSPITAL MEDICATIONS:   [Held by provider] aspirin, 81 mg, oral, Daily  atorvastatin, 40 mg, oral, Daily  clopidogrel, 75 mg, oral, Daily  metoprolol tartrate, 25 mg, oral, BID  nicotine, 1 patch, transdermal, Daily         Current Outpatient Medications   Medication Instructions    amLODIPine (Norvasc) 10 mg tablet TAKE 1 TABLET MY MOUTH DAILY AS DIRECTED.    amLODIPine (Norvasc) 10 mg tablet Take 1 tablet (10 mg) by mouth once daily.    aspirin 81 mg EC tablet 1 tablet, oral, Daily    atorvastatin (LIPITOR) 40 mg, oral, Daily    clopidogrel (PLAVIX) 75 mg, oral, Daily    metoprolol tartrate (LOPRESSOR) 25 mg, oral, 2 times daily    multivitamin tablet 1 tablet, oral, Daily    nitroglycerin (Nitrostat) 0.4 mg SL tablet Place 1 tablet (0.4 mg) under the tongue every 5 minutes if needed for chest  pain (up to 3 doses).        PHYSICAL EXAMINATION:   GENERAL:  Well developed, well nourished, in no acute distress.  CHEST:  Symmetric and nontender.  NEURO/PSYCH:  Alert and oriented times three with approppriate behavior and responses.  NECK:  Supple, no JVD, no bruit.  LUNGS:  Clear to auscultation bilaterally, normal respiratory effort.  HEART:  Rate and rhythm regular with no evident murmur, no gallop appreciated.        There are no rubs, clicks or heaves.  EXTREMITIES:  Warm with good color, no clubbing or cyanosis.  There is no edema noted.  PERIPHERAL VASCULAR:  Pulses present and equally palpable; 2+ throughout.      LAB DATA:     CBC:   Results from last 7 days   Lab Units 12/07/23  0551 12/06/23  0611 12/05/23  1219   WBC AUTO x10*3/uL 6.6 6.4 6.0   RBC AUTO x10*6/uL 4.17* 3.98* 3.56*   HEMOGLOBIN g/dL 9.6* 9.1* 8.2*   HEMATOCRIT % 32.3* 30.6* 27.6*   MCV fL 78* 77* 78*   MCH pg 23.0* 22.9* 23.0*   MCHC g/dL 29.7* 29.7* 29.7*   RDW % 16.3* 16.4* 16.5*   PLATELETS AUTO x10*3/uL 207 203 178     CMP:    Results from last 7 days   Lab Units 12/07/23  0551 12/06/23  0611 12/05/23  1237 12/04/23  1431   SODIUM mmol/L 138 136 139 138   POTASSIUM mmol/L 4.0 3.9 3.9 3.6   CHLORIDE mmol/L 108* 110* 108* 107   CO2 mmol/L 21 22 24 22   BUN mg/dL 14 15 15 20   CREATININE mg/dL 1.32* 1.27 1.18 1.40*   GLUCOSE mg/dL 94 116* 83 129*   PROTEIN TOTAL g/dL  --   --   --  6.8   CALCIUM mg/dL 9.2 9.0 8.9 9.4   BILIRUBIN TOTAL mg/dL  --   --   --  0.4   ALK PHOS U/L  --   --   --  76   AST U/L  --   --   --  17   ALT U/L  --   --   --  23     BMP:    Results from last 7 days   Lab Units 12/07/23  0551 12/06/23  0611 12/05/23  1237   SODIUM mmol/L 138 136 139   POTASSIUM mmol/L 4.0 3.9 3.9   CHLORIDE mmol/L 108* 110* 108*   CO2 mmol/L 21 22 24   BUN mg/dL 14 15 15   CREATININE mg/dL 1.32* 1.27 1.18   CALCIUM mg/dL 9.2 9.0 8.9   GLUCOSE mg/dL 94 116* 83     Magnesium:  Results from last 7 days   Lab Units 12/07/23  0551  12/06/23  0611 12/04/23  1431   MAGNESIUM mg/dL 2.05 2.03 1.84     Troponin:    Results from last 7 days   Lab Units 12/04/23  2209 12/04/23  1613 12/04/23  1431   TROPHS ng/L 477* 273* 223*     BNP:   Results from last 7 days   Lab Units 12/04/23  1431   BNP pg/mL 30     Lipid Panel:  Results from last 7 days   Lab Units 12/05/23  1237   HDL mg/dL 31.0   CHOLESTEROL/HDL RATIO  3.7   VLDL mg/dL 24   TRIGLYCERIDES mg/dL 122   NON HDL CHOL. mg/dL 83        DIAGNOSTIC TESTING:       Vascular US Carotid Artery Duplex Bilateral    Result Date: 12/5/2023  Interpreted By:  Mio Dobson, STUDY: [facility] US CAROTID ARTERY DUPLEX BILATERAL;  12/5/2023 3:20 pm   INDICATION: Signs/Symptoms:remove CVA, stenosis.   COMPARISON: Duplex carotid ultrasound 16 February 2021   ACCESSION NUMBER(S): [id number]   ORDERING CLINICIAN: YAMILA GILLETTE   TECHNIQUE: Vascular ultrasound of the extracranial carotid system was performed bilaterally.  Gray scale, color Doppler and spectral Doppler waveform analysis was performed   FINDINGS: RIGHT: On grayscale US, intimal thickening throughout the common carotid; two adjacent moderate calcified atherosclerotic narrowings in the bulb without associated elevated velocity; another calcified atherosclerotic stenosis, mild-to-moderate in the most proximal cervical ICA, again without associated elevated velocity   PEAK SYSTOLIC VELOCITY TABLE, IN CM / SEC: CCA 71 ICA 97    ICA/CCA PSV Ratio: 1.4   VERTEBRAL ARTERY: Patent, with appropriate antegrade direction of flow, with normal waveform   LEFT: On grayscale US, intimal thickening throughout the common carotid; moderate to severe calcified atherosclerotic narrowing of the distal common carotid; mixed calcified and noncalcified moderate atherosclerotic narrowing at the bulb without associated elevated velocity; patent cervical ICA stent   PEAK SYSTOLIC VELOCITY TABLE, IN CM / SEC: CCA 93  ECA 60   ICA/CCA PSV Ratio: 1.6   VERTEBRAL ARTERY:  Patent, with appropriate antegrade direction of flow, with normal waveform       ATHEROSCLEROTIC NARROWINGS IN BILATERAL CAROTID BULBS AND PROXIMAL CERVICAL INTERNAL CAROTID ARTERIES, MORE SEVERE ON THE LEFT, NONE OF WHICH WITH PEAK SYSTOLIC VELOCITY OVER 180 CM/SEC ON TODAY'S EXAM   ESTIMATED CERVICAL RIGHT INTERNAL CAROTID ARTERY STENOSIS: 0-49%, BASED ON PLAQUE / MORPHOLOGIC STENOSIS ON STATIC / GRAYSCALE ULTRASOUND, NOTING NO ELEVATED PEAK SYSTOLIC VELOCITIES IN THE CERVICAL ICA   ESTIMATED CERVICAL LEFT INTERNAL CAROTID ARTERY STENOSIS: 0-49%, BASED ON PLAQUE / MORPHOLOGIC STENOSIS ON STATIC / GRAYSCALE ULTRASOUND, NOTING NO ELEVATED PEAK SYSTOLIC VELOCITIES IN THE CERVICAL ICA   The velocity criteria are extrapolated from diameter data as defined by the Society of Radiologists in Ultrasound Consensus Conference Radiology 2003; 229;340-346   MACRO: None   Signed by: Mio Dobson 12/5/2023 3:30 PM Dictation workstation:   [id number]        [place] [id number]   Tel [phone] Fax [phone]     TRANSTHORACIC ECHOCARDIOGRAM REPORT        Patient Name:      EDNA VELASCO    Reading Physician:    [id number] Aditya Sanchez DO  Study Date:        12/5/2023           Ordering Provider:    [id number] YAMILA GILLETTE  MRN/PID:           [id number]            Fellow:  Accession#:        [id number]        Nurse:  Date of Birth/Age: 1960 / 63 years Sonographer:          Светлана Anguiano RDCS  Gender:            M                   Additional Staff:  Height:            177.80 cm           Admit Date:           12/4/2023  Weight:            83.46 kg            Admission Status:     Inpatient - Routine  BSA:               2.01 m2             Department Location:  [facility] Echo                                                                Lab  Blood Pressure: 144 /63 mmHg     Study Type:    TRANSTHORACIC ECHO (TTE) COMPLETE  Diagnosis/ICD: Non ST elevation (NSTEMI) myocardial infarction-I21.4  Indication:    NSTEMI  CPT Codes:     Echo Complete w Full Doppler-60190     Patient History:  Smoker:            Current.  Pertinent History: CAD, HTN and Hyperlipidemia.     Study Detail: The following Echo studies were performed: 2D, color flow, M-Mode                and Doppler. Technically challenging study due to body habitus.        PHYSICIAN INTERPRETATION:  Left Ventricle: Left ventricular systolic function is normal, with an estimated ejection fraction of 60%. There are no regional wall motion abnormalities. The left ventricular cavity size is normal. Spectral Doppler shows a normal pattern of left ventricular diastolic filling.  LV Wall Scoring:  All segments are normal.     Left Atrium: The left atrium is normal in size.  Right Ventricle: The right ventricle is normal in size. There is normal right ventricular global systolic function.  Right Atrium: The right atrium is normal in size.  Aortic Valve: The aortic valve appears structurally normal. The aortic valve appears tricuspid. There is mild aortic valve cusp calcification. There is no evidence of aortic valve stenosis.  There is no evidence of aortic valve regurgitation. The peak instantaneous gradient of the aortic valve is 15.5 mmHg. The mean gradient of the aortic valve is 9.0 mmHg.  Mitral Valve: The mitral valve is normal in structure. There is no evidence of mitral valve stenosis. There is normal mitral valve leaflet mobility. There is trace mitral valve regurgitation.  Tricuspid Valve: The tricuspid valve is structurally normal. There is normal tricuspid valve leaflet mobility. There is trace tricuspid regurgitation.  Pulmonic Valve: The pulmonic valve is structurally normal. There is no indication of pulmonic valve  regurgitation.  Pericardium: There is no pericardial effusion noted.  Aorta: The aortic root is normal.  Pulmonary Artery: The main pulmonary artery is normal in size, and position, with normal bifurcation into the left and right pulmonary arteries. The tricuspid regurgitant velocity is 2.80 m/s, and with an estimated right atrial pressure of 3 mmHg, the estimated pulmonary artery pressure is mildly elevated with the RVSP at 34.4 mmHg.  Systemic Veins: The inferior vena cava appears to be of normal size.  In comparison to the previous echocardiogram(s): There are no prior studies on this patient for comparison purposes.        CONCLUSIONS:   1. Left ventricular systolic function is normal with a 60% estimated ejection fraction.   2. There is no evidence of mitral valve stenosis.   3. Trace mitral valve regurgitation.   4. Trace tricuspid regurgitation is visualized.   5. Aortic valve stenosis is not present.   6. The main pulmonary artery is normal in size, and position, with normal bifurcation into the left and right pulmonary arteries.     RECOMMENDATIONS:  Technically suboptimal and limited study, therefore accuracy of above interpretation could be substantially diminished. Clinical correlation is advised. Consider additional imaging modalities if clinically indicated.    Cardiac Cath Post Procedure Notes:  Post Procedure Diagnosis: Single vessel disease.  Blood Loss:               Estimated blood loss during the procedure was 0 mls.  Specimens Removed:        Number of specimen(s) removed: none.     ____________________________________________________________________________________  CONCLUSIONS:   1. Mid Left Main: 10% stenosis.   2. Left Anterior Descending Artery: contains patent previously placed stents.   3. Proximal and mid LAD Lesion: The percent stenosis is 30%.   4. Proximal and mid CX Lesion: The percent stenosis is 10-30%.   5. Right Coronary Artery: contains patent previously placed stents.   6.  Proximal RCA Lesion: The percent stenosis is 90%.   7. Prox PLVB RCA Lesion: The percent stenosis is 50 %.   8. The Left Ventricular Ejection Fraction is 75%.     ICD 10 Codes:  Non ST elevation (NSTEMI) myocardial infarction-I21.4     CPT Codes:  Left Heart Cath (visualization of coronaries) and LV-81616; Moderate Sedation Services 1st additional 15 minutes patient >5 years-[id number]     [id number] Mac Mathew MD  Performing Physician  Electronically signed by [id number] Mac Mathew MD on 12/5/2023 at 12:17:02    ECG 12 lead    Result Date: 12/5/2023  Normal sinus rhythm Right bundle branch block T wave abnormality, consider inferior ischemia Abnormal ECG When compared with ECG of 04-DEC-2023 16:19, T wave inversion now evident in Anterior leads    XR chest 1 view    Result Date: 12/4/2023  Interpreted By:  Bam Manzanares, STUDY: XR CHEST 1 VIEW;  12/4/2023 3:51 pm   INDICATION: Signs/Symptoms:Chest Pain.   COMPARISON: 10/27/2017   ACCESSION NUMBER(S): [id number]   ORDERING CLINICIAN: NERI BERMUDEZ   FINDINGS:   The cardiac silhouette is unremarkable. Costophrenic angles are sharp. Lungs are clear. The trachea is midline. There is no pneumothorax. No acute osseous abnormality is seen.       1.  No active cardiopulmonary process.     Signed by: Bam Manzanares 12/4/2023 3:58 PM Dictation workstation:   [id number]       Vascular US Carotid Artery Duplex Bilateral   Final Result   ATHEROSCLEROTIC NARROWINGS IN BILATERAL CAROTID BULBS AND PROXIMAL   CERVICAL INTERNAL CAROTID ARTERIES, MORE SEVERE ON THE LEFT, NONE OF   WHICH WITH PEAK SYSTOLIC VELOCITY OVER 180 CM/SEC ON TODAY'S EXAM        ESTIMATED CERVICAL RIGHT INTERNAL CAROTID ARTERY STENOSIS: 0-49%,   BASED ON PLAQUE / MORPHOLOGIC STENOSIS ON STATIC / GRAYSCALE   ULTRASOUND, NOTING NO ELEVATED PEAK SYSTOLIC VELOCITIES IN THE   CERVICAL ICA        ESTIMATED CERVICAL LEFT INTERNAL CAROTID ARTERY STENOSIS: 0-49%,   BASED ON PLAQUE / MORPHOLOGIC STENOSIS ON  STATIC / GRAYSCALE   ULTRASOUND, NOTING NO ELEVATED PEAK SYSTOLIC VELOCITIES IN THE   CERVICAL ICA        The velocity criteria are extrapolated from diameter data as defined   by the Society of Radiologists in Ultrasound Consensus Conference   Radiology 2003; 229;340-346        MACRO:   None        Signed by: Mio Dobson 12/5/2023 3:30 PM   Dictation workstation:   [id number]      Transthoracic Echo (TTE) Complete   Final Result      Cardiac catheterization - coronary   Final Result      XR chest 1 view   Final Result   1.  No active cardiopulmonary process.             Signed by: Bam Manzanares 12/4/2023 3:58 PM   Dictation workstation:   [id number]            RADIOLOGY:     Vascular US Carotid Artery Duplex Bilateral   Final Result   ATHEROSCLEROTIC NARROWINGS IN BILATERAL CAROTID BULBS AND PROXIMAL   CERVICAL INTERNAL CAROTID ARTERIES, MORE SEVERE ON THE LEFT, NONE OF   WHICH WITH PEAK SYSTOLIC VELOCITY OVER 180 CM/SEC ON TODAY'S EXAM        ESTIMATED CERVICAL RIGHT INTERNAL CAROTID ARTERY STENOSIS: 0-49%,   BASED ON PLAQUE / MORPHOLOGIC STENOSIS ON STATIC / GRAYSCALE   ULTRASOUND, NOTING NO ELEVATED PEAK SYSTOLIC VELOCITIES IN THE   CERVICAL ICA        ESTIMATED CERVICAL LEFT INTERNAL CAROTID ARTERY STENOSIS: 0-49%,   BASED ON PLAQUE / MORPHOLOGIC STENOSIS ON STATIC / GRAYSCALE   ULTRASOUND, NOTING NO ELEVATED PEAK SYSTOLIC VELOCITIES IN THE   CERVICAL ICA        The velocity criteria are extrapolated from diameter data as defined   by the Society of Radiologists in Ultrasound Consensus Conference   Radiology 2003; 229;340-346        MACRO:   None        Signed by: Mio Dobson 12/5/2023 3:30 PM   Dictation workstation:   [id number]      Transthoracic Echo (TTE) Complete   Final Result      Cardiac catheterization - coronary   Final Result      XR chest 1 view   Final Result   1.  No active cardiopulmonary process.             Signed by: Bam Manzanares 12/4/2023 3:58 PM   Dictation workstation:   [id number]           PROBLEM LIST     Patient Active Problem List   Diagnosis    Abnormal results of cardiovascular function studies    Bruising    CAD S/P percutaneous coronary angioplasty    Carotid atherosclerosis    Carotid bruit    Carotid stent occlusion (CMS/HCC)    Chest pain    Chronic kidney disease, stage 3 (CMS/HCC)    Current smoker    Dizziness    Dyslipidemia    Essential hypertension    History of acute myocardial infarction    History of CVA (cerebrovascular accident)    History of transient ischemic attack    Intermittent claudication (CMS/HCC)    Mixed hyperlipidemia    PAD (peripheral artery disease) (CMS/HCC)    Presence of stent in coronary artery    Slurred speech    NSTEMI (non-ST elevated myocardial infarction) (CMS/[facility])       ASSESSMENT:   63-year-old gentleman seen evaluate the bedside in conjunction with Gadiel Valdez RN, CNP in the telemetry unit.     Bedside examination evaluation interview were performed by me.     Chart was reviewed in detail discussed the patient at length including prior cardiovascular evaluations current symptomatology and findings and need for catheterization.     IMPRESSION:   1. Coronary artery disease, active angina with findings consistent with non-STEMI  2. Non-ST-segment elevation myocardial infarction in 2017.  3. Multivessel percutaneous coronary intervention including unprotected left main stenting with drug-eluting stent, October 30, 2017.  4. Carotid artery disease, remote left internal carotid artery stenting, in-stent restenosis with subsequent balloon angioplasty with good results.  5. Moderate atherosclerotic disease and proximal left subclavian artery.  6. Remote cerebrovascular accident with no significant residual disease.  7. Mixed hyperlipidemia.  8. Essential hypertension.  9. Tobacco abuse  10. Overweight.  11. Chronic kidney disease, stage III.        PLAN:     12/7  63-year-old gentleman seen evaluate the bedside in conjunction with Gadiel Valdez RN,  CNP in the telemetry unit.     Bedside examination evaluation interview were performed by me.     Chart was reviewed in detail discussed the patient at length including prior cardiovascular evaluations current symptomatology and findings and need for catheterization.         IMPRESSION:   1. Coronary artery disease, active angina with findings consistent with non-STEMI  2. Non-ST-segment elevation myocardial infarction in 2017.  3. Multivessel percutaneous coronary intervention including unprotected left main stenting with drug-eluting stent, October 30, 2017.  4. Carotid artery disease, remote left internal carotid artery stenting, in-stent restenosis with subsequent balloon angioplasty with good results.  5. Moderate atherosclerotic disease and proximal left subclavian artery.  6. Remote cerebrovascular accident with no significant residual disease.  7. Mixed hyperlipidemia.  8. Essential hypertension.  9. Tobacco abuse  10. Overweight.  11. Chronic kidney disease, stage III.    Recommendation:  Admit to medicine team, continue telemetry monitoring per unit protocol  Supplemental O2  Monitor electrolytes, keep potassium greater than 4 and magnesium greater than 2  Continue aspirin, Plavix, statin and beta-blocker  Troponin elevation in the setting of acute chest pain similar to prior MI.  EGD and colonoscopy today  Plan for LHC with PCI to RCA tomorrow with Dr. Lomas  Gentle hydration with known CKD stage III  Offer nicotine patch for smoking cessation  Further recommendations post LHC    Discussion:  Patient undergoing GI studies today with potential PCI and stenting tomorrow.  Await GI results for final disposition.     Aditya Sanchez DO,FACC     Gadiel Valdez [facility]  Adult Gerontology Acute Care Nurse Practitioner  [facility]  [phone]     Of note, this documentation is completed using the Dragon Dictation system (voice recognition  software). There may be spelling and/or grammatical errors that were not corrected prior to final submission.       Please do not hesitate to call with questions.  Electronically signed by Aditya Sanchez DO on 12/7/2023 at 10:16 AM

## 2023-12-07 NOTE — ANESTHESIA POSTPROCEDURE EVALUATION
Patient: Ranjit Zurita    Procedure Summary       Date: 12/07/23 Room / Location: [facility]    Anesthesia Start: 0831 Anesthesia Stop: 0904    Procedures:       EGD      COLONOSCOPY Diagnosis: Iron deficiency anemia, unspecified iron deficiency anemia type    Scheduled Providers: Dk Bragg MD Responsible Provider: Felix Olmos DO    Anesthesia Type: MAC ASA Status: 3            Anesthesia Type: MAC    Vitals Value Taken Time   BP 96/53 12/07/23 0905   Temp 36.5 12/07/23 0905   Pulse 70 12/07/23 0905   Resp 18 12/07/23 0905   SpO2 99 12/07/23 0905       Anesthesia Post Evaluation    Patient location during evaluation: floor  Patient participation: complete - patient participated  Level of consciousness: awake and alert  Pain score: 0  Pain management: adequate  Airway patency: patent  Cardiovascular status: acceptable and stable  Respiratory status: acceptable  Hydration status: acceptable  Postoperative Nausea and Vomiting: none      No notable events documented.

## 2023-12-07 NOTE — PROGRESS NOTES
"PROGRESS NOTE    ASSESSMENT AND PLAN:    Non-ST elevation MI  Coronary artery disease status post PCI  Hypertensive urgency  -Presented to the emergency room with symptoms of angina.  -Underwent a cardiac cath and had a 90% stenosis to RCA.  -Plan for repeat cardiac cath tomorrow with intervention     Iron deficiency anemia  -Currently has no active bleeding.  -Underwent colonoscopy which showed polypoid mass, biopsies taken, no evidence of any acute bleeding  -Will give 1 dose of IV iron today, continue to trend hemoglobin hematocrit           SUBJECTIVE:   Admit Date: 12/4/2023    Interval History: He is frustrated that he could not have his mass removed.  Frustrated that he still in the hospital.  Otherwise has no active complaints      OBJECTIVE:   Vitals: /72   Pulse 75   Temp 36 °C (96.8 °F)   Resp 16   Ht 1.778 m (5' 10\")   Wt 83.9 kg (184 lb 15.5 oz)   SpO2 94%   BMI 26.54 kg/m²    Wt Readings from Last 3 Encounters:   12/04/23 83.9 kg (184 lb 15.5 oz)   03/01/23 87.6 kg (193 lb 3 oz)   03/02/22 87.7 kg (193 lb 5 oz)      24HR INTAKE/OUTPUT:    Intake/Output Summary (Last 24 hours) at 12/7/2023 1434  Last data filed at 12/7/2023 0904  Gross per 24 hour   Intake 300 ml   Output --   Net 300 ml       PHYSICAL EXAM:   GENERAL: Laying in bed, does not appear to be in any distress.   HEENT: HEAD: Normocephalic atraumatic.  Neck: Supple.  Eyes: Pupils are reactive to direct light.   CVS: S1, S2 heard. Regular rate and rhythm  LUNGS: Clear to auscultate bilaterally. No wheezing or rhonchi appreciated.  ABDOMEN: Soft, nontender to palpate. Positive bowel sounds. No guarding or rebound appreciated.  NEUROLOGICAL: No focal neurological deficits appreciated. Cranial nerves are grossly intact.  EXTREMITIES: No edema appreciated.  SKIN:  Grossly intact, warm and dry.      LABS/IMAGING AND MEDICATIONS:   Scheduled Meds:[Held by provider] aspirin, 81 mg, oral, Daily  atorvastatin, 40 mg, oral, " "Daily  clopidogrel, 75 mg, oral, Daily  metoprolol tartrate, 25 mg, oral, BID  nicotine, 1 patch, transdermal, Daily      PRN Meds:PRN medications: acetaminophen **OR** acetaminophen **OR** acetaminophen, nitroglycerin, ondansetron, polyethylene glycol, polyethylene glycol      Results from last 7 days   Lab Units 12/04/23  1431   BNP pg/mL 30     Results from last 7 days   Lab Units 12/04/23  1431   INR  1.1         BMP:  Results from last 7 days   Lab Units 12/07/23  0551 12/06/23  0611 12/05/23  1237   SODIUM mmol/L 138 136 139   POTASSIUM mmol/L 4.0 3.9 3.9   CHLORIDE mmol/L 108* 110* 108*   CO2 mmol/L 21 22 24   BUN mg/dL 14 15 15   CREATININE mg/dL 1.32* 1.27 1.18       CBC:  Results from last 7 days   Lab Units 12/07/23  0551 12/06/23  0611 12/05/23  1219   WBC AUTO x10*3/uL 6.6 6.4 6.0   RBC AUTO x10*6/uL 4.17* 3.98* 3.56*   HEMOGLOBIN g/dL 9.6* 9.1* 8.2*   HEMATOCRIT % 32.3* 30.6* 27.6*   MCV fL 78* 77* 78*   MCH pg 23.0* 22.9* 23.0*   MCHC g/dL 29.7* 29.7* 29.7*   RDW % 16.3* 16.4* 16.5*   PLATELETS AUTO x10*3/uL 207 203 178       Cardiac Enzymes:   Results from last 7 days   Lab Units 12/04/23  2209 12/04/23  1613 12/04/23  1431   TROPHS ng/L 477* 273* 223*         Hepatic Function Panel:  Results from last 7 days   Lab Units 12/04/23  1431   ALK PHOS U/L 76   ALT U/L 23   AST U/L 17   PROTEIN TOTAL g/dL 6.8   BILIRUBIN TOTAL mg/dL 0.4       Magnesium:  Results from last 7 days   Lab Units 12/07/23  0551   MAGNESIUM mg/dL 2.05       Pro-BNP:  No results found for: \"PROBNP\"    INR:  Results from last 7 days   Lab Units 12/04/23  1431   PROTIME seconds 12.2   INR  1.1       TSH:  No results found for: \"TSH\"    Lipid Profile:  Results from last 7 days   Lab Units 12/05/23  1237   TRIGLYCERIDES mg/dL 122   HDL mg/dL 31.0   LDL CALC mg/dL 59       CMP:  Results from last 7 days   Lab Units 12/07/23  0551 12/06/23  0611 12/05/23  1237 12/04/23  1431   SODIUM mmol/L 138 136 139 138   POTASSIUM mmol/L 4.0 3.9 3.9 " 3.6   CHLORIDE mmol/L 108* 110* 108* 107   CO2 mmol/L 21 22 24 22   BUN mg/dL 14 15 15 20   CREATININE mg/dL 1.32* 1.27 1.18 1.40*   GLUCOSE mg/dL 94 116* 83 129*   CALCIUM mg/dL 9.2 9.0 8.9 9.4   PROTEIN TOTAL g/dL  --   --   --  6.8   BILIRUBIN TOTAL mg/dL  --   --   --  0.4   ALK PHOS U/L  --   --   --  76   AST U/L  --   --   --  17   ALT U/L  --   --   --  23

## 2023-12-07 NOTE — NURSING NOTE
Huddle and Timeout completed together with team. Patient wristband and GLORY information verified.

## 2023-12-08 NOTE — DISCHARGE INSTRUCTIONS
It was nice caring for you during your stay at [facility]. As we discussed,    Please continue to take your medications and follow-up.    Wishing you a healthy recovery!

## 2023-12-08 NOTE — PROGRESS NOTES
Patient is stable status post PCI under the care of Dr. Lomas.  Discussed results of procedure with patient.  Pictures provided.  Patient underwent successful PCI with 1 JOE placed to the proximal RCA reducing that 90% lesion down to 0% stenosis.  He tolerated the procedure well.  Please see procedural report for complete details.  Patient was reinitiated on aspirin 81 mg daily and will continue Plavix 75 mg daily.  He can be discharged home this afternoon from a cardiac standpoint barring no complications.  He will be scheduled to follow-up with Dr. Lomas in 1 to 2 weeks.  Postprocedural activity, restrictions, potential complications, medications and future follow-up discussed at length.  All questions answered.  Patient verbalized understanding.

## 2023-12-08 NOTE — POST-PROCEDURE NOTE
Physician Transition of Care Summary  Invasive Cardiovascular Lab    Procedure Date: 12/8/2023  Attending:    * Mac Lomas - Primary  Resident/Fellow/Other Assistant: Surgeon(s) and Role:    Pre Procedure Diagnosis:   NSTEMI    Post Procedure Diagnosis:   JOE of RCA    Complications:   None    Stents/Implants:   JOE of proximal RCA    Anticoagulation/Antiplatelet Plan:   Low-dose aspirin and Plavix    Estimated Blood Loss:   0 mL    Electronically signed by: Mac Lomas MD, 12/8/2023 8:17 AM    Anesthesia: Moderate                            anesthesia Staff: None

## 2023-12-08 NOTE — NURSING NOTE
Patient recovered on this unit after LHC with stent. Right groin WNL with no complaints of pain. VSS, up steady. Right IV removed per patient request due to redness and soreness. Tele shows NSR. Report called to 9s RN. Patient to be transferred back to the 9th floor.

## 2023-12-08 NOTE — DISCHARGE SUMMARY
DISCHARGE DIAGNOSIS     Non-ST elevation MI  Coronary artery disease status post PCI  Hypertensive urgency  Tobacco dependence  Iron deficiency anemia  Villous mass in colon  Diverticulosis    HOSPITAL COURSE AND DETAILS     Mr. Zurita is a 63-year-old male with past medical history of coronary artery disease tobacco dependence, hypertension who presented to the emergency room with worsening symptoms of angina.    Given his extensive cardiac history, he underwent a PCI and was found to have 90% stenosis of his RCA.  He did have anemia on admission of 9.1, he denied any evidence of bleeding.  Given his anemia, cardiology wanted a GI evaluation prior to performing percutaneous intervention.    His iron studies were suggestive of iron deficiency anemia, he received IV iron.  He underwent a colonoscopy and an endoscopy.  Findings are as follows:    The terminal ileum appeared normal.;  Polypoid mass (traversable) in the ileocecal valve; no bleeding was identified; performed cold forceps biopsy  The mass appeared villous and soft, and appeared on the backside of the ICV.  It was 40 mm.  8-10 biopsies taken.  Multiple small and large, extensive pancolonic diverticula; no bleeding was identified  Six sessile polyps measuring from 5 mm up to 10 mm in the transverse colon, descending colon and rectosigmoid  One 15 mm pedunculated polyp in the rectosigmoid 15 cm from the anal verge; performed cold forceps biopsy  Performed forceps biopsies in the body of the stomach, incisura and antrum to rule out H. pylori  Small hiatal hernia  Schatzki ring in the GE junction  Single 5 mm x 7 mm mucosal nodule was visualized in the GE junction; performed cold forceps biopsy    Unfortunately given that patient is on dual antiplatelet therapy, the polypoid mass was not excised.  He will need follow-up after he is off dual antiplatelet therapy for removal of his mass as well as multiple polyps.    He underwent PCI to his RCA, cardiology  advised aspirin and statin, outpatient follow-up with Dr. Lomas and GI as outpatient      Total time spent on discharge planning and coordination of care 40 minutes.  Discharge planning was discussed with patient, she understands and agrees with plan of care.    * Some of these notes were completed using Dragon voice recognition technology and may include unintended areas with respect to translation of word, typographical errors or primary areas which may not have been identified prior to finalization of the document.  DISCHARGE PHYSICAL EXAM     Last Recorded Vitals:  Vitals:    12/08/23 0036 12/08/23 0409 12/08/23 0742 12/08/23 1256   BP: 130/68 127/66 140/77 137/68   Pulse: 72 69 89 108   Resp: 16 16 18    Temp: 36.5 °C (97.7 °F) 36.6 °C (97.9 °F)  37.1 °C (98.8 °F)   TempSrc:       SpO2: 92% 91% 96% 94%   Weight:       Height:           Physical Exam  PHYSICAL EXAM:   GENERAL: Laying in bed, does not appear to be in any distress.   HEENT: HEAD: Normocephalic atraumatic.  Neck: Supple.  Eyes: Pupils are reactive to direct light.   CVS: S1, S2 heard. Regular rate and rhythm  LUNGS: Clear to auscultate bilaterally. No wheezing or rhonchi appreciated.  ABDOMEN: Soft, nontender to palpate. Positive bowel sounds. No guarding or rebound appreciated.  NEUROLOGICAL: No focal neurological deficits appreciated. Cranial nerves are grossly intact.  EXTREMITIES: No edema appreciated.  SKIN:  Grossly intact, warm and dry.    DISCHARGE MEDICATIONS        Your medication list        START taking these medications        Instructions Last Dose Given Next Dose Due   ferrous gluconate 324 (38 Fe) mg tablet  Commonly known as: Fergon      Take 1 tablet (38 mg of iron) by mouth once daily with a meal.              CHANGE how you take these medications        Instructions Last Dose Given Next Dose Due   amLODIPine 10 mg tablet  Commonly known as: Norvasc  What changed: See the new instructions.      Take 0.5 tablets (5 mg) by  mouth once daily.              CONTINUE taking these medications        Instructions Last Dose Given Next Dose Due   aspirin 81 mg EC tablet           atorvastatin 40 mg tablet  Commonly known as: Lipitor           clopidogrel 75 mg tablet  Commonly known as: Plavix      Take 1 tablet (75 mg) by mouth once daily.       metoprolol tartrate 25 mg tablet  Commonly known as: Lopressor           multivitamin tablet           nitroglycerin 0.4 mg SL tablet  Commonly known as: Nitrostat                     Where to Get Your Medications        These medications were sent to [id number] - [place], [place] - [address]  [address], [place] [address]      Phone: [phone]   amLODIPine 10 mg tablet  clopidogrel 75 mg tablet  ferrous gluconate 324 (38 Fe) mg tablet           OUTPATIENT FOLLOW-UP     Future Appointments   Date Time Provider Department Center   12/27/2023  2:30 PM Mac Lomas MD [id number] [place]   3/5/2024  1:00 PM [id number] ECHO/VASC 4 [id number] [facility]   3/13/2024 12:45 PM Mac Lomas MD [id number] [place]

## 2023-12-27 NOTE — PROGRESS NOTES
CARDIOLOGY OFFICE VISIT      CHIEF COMPLAINT      HISTORY OF PRESENT ILLNESS  The patient is being seen after recent hospitalization at [facility].  He was discharged on 12/8/2023.  He presented with a NSTEMI.  He was found to have iron deficiency anemia with hemoglobin of 9.1.  He did undergo GI evaluation.  He does have a polypoid mass at the ileal cecal valve and multiple polyps in the transverse colon that need to be removed.  He will be followed up with Dr. Villegas from GI.  He denies chest discomfort or symptoms of myocardial ischemia.  He has not used nitroglycerin.  He denies palpitations and syncope.  He did not have any repeat CBC is ordered.  I will get 1 today and then again in a month.  He is going to return to work Tuesday as a  part-time.  He will let me know if any problems.  I told him ideally I like to keep him on dual antiplatelet therapy for 6 months.  If need be we could discontinue it in 3 months so he can have his GI procedure performed.    IMPRESSION:   1. Coronary artery disease, no angina.  2. Non-ST-segment elevation myocardial infarction in 2017 and December 2023.  3. Multivessel percutaneous coronary intervention including unprotected left main stenting with drug-eluting stent, October 30, 2017, most recent PCI JOE of proximal RCA 12/8/2023.  4. Carotid artery disease, remote left internal carotid artery stenting, in-stent restenosis with subsequent balloon angioplasty with good results.  5. Moderate atherosclerotic disease and proximal left subclavian artery.  6. Remote cerebrovascular accident with no significant residual disease.  7. Mixed hyperlipidemia.  8. Essential hypertension.  9. Tobacco abuse  10. Overweight.  11. Chronic kidney disease, stage III.  12.  Iron deficiency anemia secondary to GI bleed secondary to polyps as described above, December 2023, awaiting subsequent GI procedure, please see above        Please excuse any errors in grammar or translation related to  this dictation. Voice recognition software was utilized to prepare this document.         Past Medical History  Past Medical History:   Diagnosis Date    Atherosclerotic heart disease of native coronary artery without angina pectoris     3-vessel CAD    Encounter for screening for malignant neoplasm of colon     Encounter for screening colonoscopy    Neuralgia and neuritis, unspecified     Neuralgia    Pain in leg, unspecified     Lower extremity pain    Pain in unspecified foot     Foot pain    Pain in unspecified shoulder     Shoulder pain    Personal history of other diseases of the circulatory system     History of hypertension    Personal history of other diseases of urinary system     History of chronic kidney disease    Personal history of other endocrine, nutritional and metabolic disease     History of high cholesterol    Personal history of other endocrine, nutritional and metabolic disease     History of hyperlipidemia    Personal history of other specified conditions     History of fatigue       Social History  Social History     Tobacco Use    Smoking status: Not on file    Smokeless tobacco: Not on file   Substance Use Topics    Alcohol use: Not on file    Drug use: Not on file       Family History   No family history on file.     Allergies:  Allergies   Allergen Reactions    Cyclobenzaprine Hives    Losartan Myalgia     Joint pain all over        Outpatient Medications:  Current Outpatient Medications   Medication Instructions    amLODIPine (NORVASC) 5 mg, oral, Daily    aspirin 81 mg EC tablet 1 tablet, oral, Daily    atorvastatin (LIPITOR) 40 mg, oral, Daily    clopidogrel (PLAVIX) 75 mg, oral, Daily    ferrous gluconate (Fergon) 324 (38 Fe) mg tablet 38 mg of iron, oral, Daily with breakfast    metoprolol tartrate (LOPRESSOR) 25 mg, oral, 2 times daily    multivitamin tablet 1 tablet, oral, Daily    nitroglycerin (Nitrostat) 0.4 mg SL tablet Place 1 tablet (0.4 mg) under the tongue every 5 minutes  if needed for chest pain (up to 3 doses).          REVIEW OF SYSTEMS  Review of Systems   All other systems reviewed and are negative.        VITALS  There were no vitals filed for this visit.    PHYSICAL EXAM  Vitals reviewed.   Constitutional:       Appearance: Normal and healthy appearance. Well-developed and not in distress.   Eyes:      Conjunctiva/sclera: Conjunctivae normal.      Pupils: Pupils are equal, round, and reactive to light.   Neck:      Vascular: No JVR. JVD normal.   Pulmonary:      Effort: Pulmonary effort is normal.      Breath sounds: Normal breath sounds. No wheezing. No rhonchi. No rales.   Chest:      Chest wall: Not tender to palpatation.   Cardiovascular:      PMI at left midclavicular line. Normal rate. Regular rhythm. Normal S1. Normal S2.       Murmurs: There is no murmur.      No gallop.  No click. No rub.   Pulses:     Intact distal pulses.   Edema:     Peripheral edema absent.   Abdominal:      Tenderness: There is no abdominal tenderness.   Musculoskeletal: Normal range of motion.         General: No tenderness.      Cervical back: Normal range of motion. Skin:     General: Skin is warm and dry.   Neurological:      General: No focal deficit present.      Mental Status: Alert and oriented to person, place and time.   Psychiatric:         Behavior: Behavior is cooperative.           ASSESSMENT AND PLAN  Diagnoses and all orders for this visit:  Anemia, unspecified type  CAD S/P percutaneous coronary angioplasty  NSTEMI (non-ST elevated myocardial infarction) (CMS/[facility])  Carotid atherosclerosis, unspecified laterality  History of CVA (cerebrovascular accident)  Essential hypertension  Dyslipidemia  Current smoker  BMI 26.0-26.9,adult  Stage 3a chronic kidney disease (CMS/[facility])      [unfilled]

## 2023-12-27 NOTE — PATIENT INSTRUCTIONS
Will get lab work (CBC) today and in 1 month  Office to call with lab results  Follow up with Dr. Bragg in 3 months.  Office to schedule apt.  Would like to wait 6 months to hold plavix and aspirin with recent stent placement.  Keep apt for CRD and OV in March 2024  Continue same medications and treatments.   Patient educated on proper medication use.   Patient educated on risk factor modification.   Please bring any lab results from other providers / physicians to your next appointment.     Please bring all medicines, vitamins, and herbal supplements with you when you come to the office.     Prescriptions will not be filled unless you are compliant with your follow up appointments or have a follow up appointment scheduled as per instruction of your physician. Refills should be requested at the time of your visit.   Ryanne GARDNER LPN, am scribing for and in the presence of Dr. Mac Lomas

## 2024-01-01 ENCOUNTER — OFFICE VISIT (OUTPATIENT)
Dept: CARDIOLOGY | Facility: CLINIC | Age: 64
End: 2024-01-01
Payer: COMMERCIAL

## 2024-01-01 ENCOUNTER — HOSPITAL ENCOUNTER (INPATIENT)
Facility: HOSPITAL | Age: 64
LOS: 2 days | End: 2024-06-10
Attending: INTERNAL MEDICINE | Admitting: INTERNAL MEDICINE
Payer: COMMERCIAL

## 2024-01-01 ENCOUNTER — APPOINTMENT (OUTPATIENT)
Dept: CARDIOLOGY | Facility: HOSPITAL | Age: 64
DRG: 003 | End: 2024-01-01
Payer: COMMERCIAL

## 2024-01-01 ENCOUNTER — APPOINTMENT (OUTPATIENT)
Dept: RADIOLOGY | Facility: HOSPITAL | Age: 64
DRG: 003 | End: 2024-01-01
Payer: COMMERCIAL

## 2024-01-01 ENCOUNTER — PREP FOR PROCEDURE (OUTPATIENT)
Dept: CARDIAC SURGERY | Facility: CLINIC | Age: 64
End: 2024-01-01

## 2024-01-01 ENCOUNTER — APPOINTMENT (OUTPATIENT)
Dept: CARDIOLOGY | Facility: HOSPITAL | Age: 64
End: 2024-01-01
Payer: COMMERCIAL

## 2024-01-01 ENCOUNTER — APPOINTMENT (OUTPATIENT)
Dept: RADIOLOGY | Facility: HOSPITAL | Age: 64
End: 2024-01-01
Payer: COMMERCIAL

## 2024-01-01 ENCOUNTER — LAB (OUTPATIENT)
Dept: LAB | Facility: LAB | Age: 64
End: 2024-01-01
Payer: COMMERCIAL

## 2024-01-01 ENCOUNTER — HOSPITAL ENCOUNTER (OUTPATIENT)
Dept: GASTROENTEROLOGY | Facility: HOSPITAL | Age: 64
Setting detail: OUTPATIENT SURGERY
Discharge: HOME | End: 2024-06-06
Payer: COMMERCIAL

## 2024-01-01 ENCOUNTER — HOSPITAL ENCOUNTER (INPATIENT)
Facility: HOSPITAL | Age: 64
LOS: 1 days | Discharge: ANOTHER HOSPITAL | End: 2024-06-08
Attending: STUDENT IN AN ORGANIZED HEALTH CARE EDUCATION/TRAINING PROGRAM | Admitting: HOSPITALIST
Payer: COMMERCIAL

## 2024-01-01 ENCOUNTER — OFFICE VISIT (OUTPATIENT)
Dept: GASTROENTEROLOGY | Facility: CLINIC | Age: 64
End: 2024-01-01
Payer: COMMERCIAL

## 2024-01-01 ENCOUNTER — ANESTHESIA (OUTPATIENT)
Dept: GASTROENTEROLOGY | Facility: HOSPITAL | Age: 64
End: 2024-01-01
Payer: COMMERCIAL

## 2024-01-01 ENCOUNTER — HOSPITAL ENCOUNTER (OUTPATIENT)
Dept: CARDIOLOGY | Facility: CLINIC | Age: 64
Discharge: HOME | End: 2024-03-05
Payer: COMMERCIAL

## 2024-01-01 ENCOUNTER — ANESTHESIA EVENT (OUTPATIENT)
Dept: GASTROENTEROLOGY | Facility: HOSPITAL | Age: 64
End: 2024-01-01
Payer: COMMERCIAL

## 2024-01-01 VITALS
HEIGHT: 70 IN | WEIGHT: 180 LBS | TEMPERATURE: 97 F | BODY MASS INDEX: 25.77 KG/M2 | RESPIRATION RATE: 15 BRPM | SYSTOLIC BLOOD PRESSURE: 130 MMHG | HEART RATE: 66 BPM | DIASTOLIC BLOOD PRESSURE: 66 MMHG | OXYGEN SATURATION: 97 %

## 2024-01-01 VITALS
HEIGHT: 70 IN | SYSTOLIC BLOOD PRESSURE: 123 MMHG | WEIGHT: 189 LBS | HEART RATE: 70 BPM | DIASTOLIC BLOOD PRESSURE: 76 MMHG | BODY MASS INDEX: 27.06 KG/M2

## 2024-01-01 VITALS
OXYGEN SATURATION: 100 % | RESPIRATION RATE: 22 BRPM | BODY MASS INDEX: 24.55 KG/M2 | SYSTOLIC BLOOD PRESSURE: 98 MMHG | DIASTOLIC BLOOD PRESSURE: 80 MMHG | TEMPERATURE: 98.6 F | HEIGHT: 72 IN | WEIGHT: 181.22 LBS | HEART RATE: 113 BPM

## 2024-01-01 VITALS
HEART RATE: 74 BPM | DIASTOLIC BLOOD PRESSURE: 72 MMHG | WEIGHT: 184.6 LBS | HEIGHT: 70 IN | OXYGEN SATURATION: 93 % | BODY MASS INDEX: 26.43 KG/M2 | SYSTOLIC BLOOD PRESSURE: 128 MMHG | RESPIRATION RATE: 18 BRPM

## 2024-01-01 VITALS
BODY MASS INDEX: 26.1 KG/M2 | DIASTOLIC BLOOD PRESSURE: 72 MMHG | WEIGHT: 186.4 LBS | HEART RATE: 72 BPM | HEIGHT: 71 IN | SYSTOLIC BLOOD PRESSURE: 120 MMHG

## 2024-01-01 VITALS
HEIGHT: 72 IN | WEIGHT: 184.3 LBS | SYSTOLIC BLOOD PRESSURE: 104 MMHG | HEART RATE: 66 BPM | DIASTOLIC BLOOD PRESSURE: 70 MMHG | BODY MASS INDEX: 24.96 KG/M2

## 2024-01-01 VITALS
HEIGHT: 72 IN | TEMPERATURE: 99.7 F | BODY MASS INDEX: 25.56 KG/M2 | WEIGHT: 188.71 LBS | HEART RATE: 95 BPM | SYSTOLIC BLOOD PRESSURE: 77 MMHG | DIASTOLIC BLOOD PRESSURE: 65 MMHG | RESPIRATION RATE: 18 BRPM | OXYGEN SATURATION: 98 %

## 2024-01-01 DIAGNOSIS — I25.10 CAD S/P PERCUTANEOUS CORONARY ANGIOPLASTY: ICD-10-CM

## 2024-01-01 DIAGNOSIS — D12.2 ADENOMATOUS POLYP OF ASCENDING COLON: ICD-10-CM

## 2024-01-01 DIAGNOSIS — D50.0 IRON DEFICIENCY ANEMIA DUE TO CHRONIC BLOOD LOSS: Primary | ICD-10-CM

## 2024-01-01 DIAGNOSIS — F17.200 CURRENT SMOKER: ICD-10-CM

## 2024-01-01 DIAGNOSIS — I21.4 NSTEMI (NON-ST ELEVATED MYOCARDIAL INFARCTION) (MULTI): ICD-10-CM

## 2024-01-01 DIAGNOSIS — D50.0 IRON DEFICIENCY ANEMIA DUE TO CHRONIC BLOOD LOSS: ICD-10-CM

## 2024-01-01 DIAGNOSIS — E78.5 HYPERLIPIDEMIA, UNSPECIFIED: ICD-10-CM

## 2024-01-01 DIAGNOSIS — I20.0 UNSTABLE ANGINA PECTORIS (MULTI): ICD-10-CM

## 2024-01-01 DIAGNOSIS — D12.2 ADENOMATOUS POLYP OF ASCENDING COLON: Primary | ICD-10-CM

## 2024-01-01 DIAGNOSIS — I73.9 PAD (PERIPHERAL ARTERY DISEASE) (CMS-HCC): ICD-10-CM

## 2024-01-01 DIAGNOSIS — Z86.79 HISTORY OF HYPERTENSION: ICD-10-CM

## 2024-01-01 DIAGNOSIS — T82.898D CAROTID STENT OCCLUSION, SUBSEQUENT ENCOUNTER: ICD-10-CM

## 2024-01-01 DIAGNOSIS — A41.9 SEPSIS, DUE TO UNSPECIFIED ORGANISM, UNSPECIFIED WHETHER ACUTE ORGAN DYSFUNCTION PRESENT (MULTI): ICD-10-CM

## 2024-01-01 DIAGNOSIS — Z98.61 CAD S/P PERCUTANEOUS CORONARY ANGIOPLASTY: ICD-10-CM

## 2024-01-01 DIAGNOSIS — I10 ESSENTIAL (PRIMARY) HYPERTENSION: ICD-10-CM

## 2024-01-01 DIAGNOSIS — F17.200 NICOTINE DEPENDENCE, UNCOMPLICATED, UNSPECIFIED NICOTINE PRODUCT TYPE: ICD-10-CM

## 2024-01-01 DIAGNOSIS — I65.23 ATHEROSCLEROSIS OF BOTH CAROTID ARTERIES: ICD-10-CM

## 2024-01-01 DIAGNOSIS — E78.2 MIXED HYPERLIPIDEMIA: ICD-10-CM

## 2024-01-01 DIAGNOSIS — Z86.73 HISTORY OF CVA (CEREBROVASCULAR ACCIDENT): ICD-10-CM

## 2024-01-01 DIAGNOSIS — I10 ESSENTIAL HYPERTENSION: ICD-10-CM

## 2024-01-01 DIAGNOSIS — N18.31 STAGE 3A CHRONIC KIDNEY DISEASE (MULTI): ICD-10-CM

## 2024-01-01 DIAGNOSIS — R09.89 CAROTID BRUIT, UNSPECIFIED LATERALITY: ICD-10-CM

## 2024-01-01 DIAGNOSIS — I77.1 SUBCLAVIAN ARTERY STENOSIS, LEFT (CMS-HCC): ICD-10-CM

## 2024-01-01 DIAGNOSIS — R57.0 CARDIOGENIC SHOCK (MULTI): ICD-10-CM

## 2024-01-01 DIAGNOSIS — I21.02 ST ELEVATION (STEMI) MYOCARDIAL INFARCTION INVOLVING LEFT ANTERIOR DESCENDING CORONARY ARTERY (MULTI): ICD-10-CM

## 2024-01-01 DIAGNOSIS — J96.00 ACUTE RESPIRATORY FAILURE, UNSPECIFIED WHETHER WITH HYPOXIA OR HYPERCAPNIA (MULTI): ICD-10-CM

## 2024-01-01 DIAGNOSIS — E78.5 DYSLIPIDEMIA: ICD-10-CM

## 2024-01-01 DIAGNOSIS — Z01.810 PREOP CARDIOVASCULAR EXAM: ICD-10-CM

## 2024-01-01 DIAGNOSIS — I21.3 STEMI (ST ELEVATION MYOCARDIAL INFARCTION) (MULTI): ICD-10-CM

## 2024-01-01 DIAGNOSIS — I21.4 NSTEMI (NON-ST ELEVATED MYOCARDIAL INFARCTION) (MULTI): Primary | ICD-10-CM

## 2024-01-01 DIAGNOSIS — E87.20 METABOLIC ACIDOSIS: ICD-10-CM

## 2024-01-01 LAB
ABO GROUP (TYPE) IN BLOOD: NORMAL
ABO GROUP (TYPE) IN BLOOD: NORMAL
ACT BLD: 158 SEC (ref 83–199)
ACT BLD: 175 SEC (ref 89–169)
ACT BLD: 260 SEC (ref 83–199)
ACT BLD: 272 SEC (ref 89–169)
ACT BLD: 284 SEC (ref 89–169)
ACT BLD: 307 SEC (ref 89–169)
ALBUMIN SERPL BCP-MCNC: 2.7 G/DL (ref 3.4–5)
ALBUMIN SERPL BCP-MCNC: 3 G/DL (ref 3.4–5)
ALBUMIN SERPL BCP-MCNC: 3.1 G/DL (ref 3.4–5)
ALBUMIN SERPL BCP-MCNC: 3.5 G/DL (ref 3.4–5)
ALBUMIN SERPL BCP-MCNC: 3.5 G/DL (ref 3.4–5)
ALBUMIN SERPL BCP-MCNC: 3.7 G/DL (ref 3.4–5)
ALBUMIN SERPL BCP-MCNC: 4 G/DL (ref 3.4–5)
ALP SERPL-CCNC: 68 U/L (ref 33–136)
ALP SERPL-CCNC: 78 U/L (ref 33–136)
ALP SERPL-CCNC: 79 U/L (ref 33–136)
ALT SERPL W P-5'-P-CCNC: 106 U/L (ref 10–52)
ALT SERPL W P-5'-P-CCNC: 107 U/L (ref 10–52)
ALT SERPL W P-5'-P-CCNC: 118 U/L (ref 10–52)
ANION GAP BLDA CALCULATED.4IONS-SCNC: 10 MMO/L (ref 10–25)
ANION GAP BLDA CALCULATED.4IONS-SCNC: 11 MMO/L
ANION GAP BLDA CALCULATED.4IONS-SCNC: 12 MMO/L (ref 10–25)
ANION GAP BLDA CALCULATED.4IONS-SCNC: 12 MMO/L (ref 10–25)
ANION GAP BLDA CALCULATED.4IONS-SCNC: 13 MMO/L (ref 10–25)
ANION GAP BLDA CALCULATED.4IONS-SCNC: 13 MMO/L (ref 10–25)
ANION GAP BLDA CALCULATED.4IONS-SCNC: 14 MMO/L (ref 10–25)
ANION GAP BLDA CALCULATED.4IONS-SCNC: 14 MMO/L (ref 10–25)
ANION GAP BLDA CALCULATED.4IONS-SCNC: 18 MMO/L (ref 10–25)
ANION GAP BLDA CALCULATED.4IONS-SCNC: 20 MMO/L (ref 10–25)
ANION GAP BLDA CALCULATED.4IONS-SCNC: 20 MMO/L (ref 10–25)
ANION GAP BLDV CALCULATED.4IONS-SCNC: 10 MMOL/L (ref 10–25)
ANION GAP BLDV CALCULATED.4IONS-SCNC: 11 MMOL/L (ref 10–25)
ANION GAP BLDV CALCULATED.4IONS-SCNC: 13 MMO/L
ANION GAP BLDV CALCULATED.4IONS-SCNC: 28 MMOL/L (ref 10–25)
ANION GAP SERPL CALC-SCNC: 11 MMOL/L (ref 10–20)
ANION GAP SERPL CALC-SCNC: 13 MMOL/L (ref 10–20)
ANION GAP SERPL CALC-SCNC: 15 MMOL/L (ref 10–20)
ANION GAP SERPL CALC-SCNC: 17 MMOL/L (ref 10–20)
ANION GAP SERPL CALC-SCNC: 19 MMOL/L (ref 10–20)
ANION GAP SERPL CALC-SCNC: 21 MMOL/L (ref 10–20)
ANION GAP SERPL CALC-SCNC: 27 MMOL/L (ref 10–20)
ANTIBODY SCREEN: NORMAL
APAP SERPL-MCNC: <10 UG/ML
APPEARANCE UR: CLEAR
APTT PPP: 33 SECONDS (ref 27–38)
APTT PPP: 38 SECONDS (ref 27–38)
APTT PPP: 38 SECONDS (ref 27–38)
APTT PPP: 95 SECONDS (ref 27–38)
APTT PPP: >200 SECONDS (ref 27–38)
AST SERPL W P-5'-P-CCNC: 443 U/L (ref 9–39)
AST SERPL W P-5'-P-CCNC: 463 U/L (ref 9–39)
AST SERPL W P-5'-P-CCNC: 555 U/L (ref 9–39)
ATRIAL RATE: 113 BPM
ATRIAL RATE: 117 BPM
ATRIAL RATE: 125 BPM
BACTERIA UR CULT: NO GROWTH
BASE EXCESS BLDA CALC-SCNC: -1.4 MMOL/L (ref -2–3)
BASE EXCESS BLDA CALC-SCNC: -1.5 MMOL/L (ref -2–3)
BASE EXCESS BLDA CALC-SCNC: -12 MMOL/L (ref -2–3)
BASE EXCESS BLDA CALC-SCNC: -12.4 MMOL/L (ref -2–3)
BASE EXCESS BLDA CALC-SCNC: -2.7 MMOL/L (ref -2–3)
BASE EXCESS BLDA CALC-SCNC: -3.2 MMOL/L (ref -2–3)
BASE EXCESS BLDA CALC-SCNC: -4.5 MMOL/L
BASE EXCESS BLDA CALC-SCNC: -4.6 MMOL/L (ref -2–3)
BASE EXCESS BLDA CALC-SCNC: -5.1 MMOL/L (ref -2–3)
BASE EXCESS BLDA CALC-SCNC: -5.8 MMOL/L (ref -2–3)
BASE EXCESS BLDA CALC-SCNC: -6.2 MMOL/L (ref -2–3)
BASE EXCESS BLDA CALC-SCNC: -6.3 MMOL/L (ref -2–3)
BASE EXCESS BLDA CALC-SCNC: -8.8 MMOL/L (ref -2–3)
BASE EXCESS BLDA CALC-SCNC: -8.8 MMOL/L (ref -2–3)
BASE EXCESS BLDA CALC-SCNC: -9.9 MMOL/L (ref -2–3)
BASE EXCESS BLDMV CALC-SCNC: 1 MMOL/L (ref -2–3)
BASE EXCESS BLDV CALC-SCNC: -1.8 MMOL/L
BASE EXCESS BLDV CALC-SCNC: -1.9 MMOL/L (ref -2–3)
BASE EXCESS BLDV CALC-SCNC: -15.1 MMOL/L (ref -2–3)
BASE EXCESS BLDV CALC-SCNC: 0.6 MMOL/L (ref -2–3)
BASOPHILS # BLD AUTO: 0.02 X10*3/UL (ref 0–0.1)
BASOPHILS # BLD AUTO: 0.03 X10*3/UL (ref 0–0.1)
BASOPHILS NFR BLD AUTO: 0.1 %
BASOPHILS NFR BLD AUTO: 0.1 %
BILIRUB DIRECT SERPL-MCNC: 0.2 MG/DL (ref 0–0.3)
BILIRUB SERPL-MCNC: 0.8 MG/DL (ref 0–1.2)
BILIRUB SERPL-MCNC: 0.9 MG/DL (ref 0–1.2)
BILIRUB SERPL-MCNC: 0.9 MG/DL (ref 0–1.2)
BILIRUB UR STRIP.AUTO-MCNC: NEGATIVE MG/DL
BLOOD EXPIRATION DATE: NORMAL
BNP SERPL-MCNC: 1362 PG/ML (ref 0–99)
BODY TEMPERATURE: 37 DEGREES CELSIUS
BODY TEMPERATURE: ABNORMAL
BUN SERPL-MCNC: 26 MG/DL (ref 6–23)
BUN SERPL-MCNC: 27 MG/DL (ref 6–23)
BUN SERPL-MCNC: 27 MG/DL (ref 6–23)
BUN SERPL-MCNC: 38 MG/DL (ref 6–23)
BUN SERPL-MCNC: 42 MG/DL (ref 6–23)
BUN SERPL-MCNC: 42 MG/DL (ref 6–23)
BUN SERPL-MCNC: 46 MG/DL (ref 6–23)
CA-I BLD-SCNC: 1.06 MMOL/L (ref 1.1–1.33)
CA-I BLD-SCNC: 1.07 MMOL/L (ref 1.1–1.33)
CA-I BLD-SCNC: 1.23 MMOL/L (ref 1.1–1.33)
CA-I BLD-SCNC: 1.26 MMOL/L (ref 1.1–1.33)
CA-I BLD-SCNC: 1.3 MMOL/L (ref 1.1–1.33)
CA-I BLDA-SCNC: 0.47 MMOL/L (ref 1.1–1.33)
CA-I BLDA-SCNC: 1.05 MMOL/L (ref 1.1–1.33)
CA-I BLDA-SCNC: 1.09 MMOL/L (ref 1.1–1.33)
CA-I BLDA-SCNC: 1.13 MMOL/L (ref 1.1–1.33)
CA-I BLDA-SCNC: 1.15 MMOL/L (ref 1.1–1.33)
CA-I BLDA-SCNC: 1.18 MMOL/L (ref 1.1–1.33)
CA-I BLDA-SCNC: 1.21 MMOL/L (ref 1.1–1.33)
CA-I BLDA-SCNC: 1.23 MMOL/L (ref 1.1–1.33)
CA-I BLDA-SCNC: 1.31 MMOL/L (ref 1.1–1.33)
CA-I BLDA-SCNC: 1.35 MMOL/L (ref 1.1–1.33)
CA-I BLDA-SCNC: 1.39 MMOL/L (ref 1.1–1.33)
CA-I BLDV-SCNC: 1.11 MMOL/L (ref 1.1–1.33)
CA-I BLDV-SCNC: 1.13 MMOL/L (ref 1.1–1.33)
CA-I BLDV-SCNC: 1.2 MMOL/L (ref 1.1–1.33)
CA-I BLDV-SCNC: 1.4 MMOL/L (ref 1.1–1.33)
CALCIUM SERPL-MCNC: 10.6 MG/DL (ref 8.6–10.6)
CALCIUM SERPL-MCNC: 7.9 MG/DL (ref 8.6–10.6)
CALCIUM SERPL-MCNC: 8 MG/DL (ref 8.6–10.3)
CALCIUM SERPL-MCNC: 8.2 MG/DL (ref 8.6–10.6)
CALCIUM SERPL-MCNC: 8.2 MG/DL (ref 8.6–10.6)
CALCIUM SERPL-MCNC: 9.1 MG/DL (ref 8.6–10.3)
CALCIUM SERPL-MCNC: 9.4 MG/DL (ref 8.6–10.6)
CARDIAC TROPONIN I PNL SERPL HS: ABNORMAL NG/L (ref 0–20)
CARDIAC TROPONIN I PNL SERPL HS: ABNORMAL NG/L (ref 0–53)
CFT FORM KAOLIN IND BLD RES TEG: >5 MIN (ref 0.8–2.1)
CHLORIDE BLDA-SCNC: 100 MMOL/L (ref 98–107)
CHLORIDE BLDA-SCNC: 103 MMOL/L (ref 98–107)
CHLORIDE BLDA-SCNC: 104 MMOL/L (ref 98–107)
CHLORIDE BLDA-SCNC: 104 MMOL/L (ref 98–107)
CHLORIDE BLDA-SCNC: 105 MMOL/L (ref 98–107)
CHLORIDE BLDA-SCNC: 106 MMOL/L (ref 98–107)
CHLORIDE BLDA-SCNC: 107 MMOL/L (ref 98–107)
CHLORIDE BLDA-SCNC: 99 MMOL/L (ref 98–107)
CHLORIDE BLDA-SCNC: 99 MMOL/L (ref 98–107)
CHLORIDE BLDV-SCNC: 100 MMOL/L (ref 98–107)
CHLORIDE BLDV-SCNC: 103 MMOL/L (ref 98–107)
CHLORIDE BLDV-SCNC: 104 MMOL/L (ref 98–107)
CHLORIDE BLDV-SCNC: 98 MMOL/L (ref 98–107)
CHLORIDE SERPL-SCNC: 100 MMOL/L (ref 98–107)
CHLORIDE SERPL-SCNC: 103 MMOL/L (ref 98–107)
CHLORIDE SERPL-SCNC: 104 MMOL/L (ref 98–107)
CHLORIDE SERPL-SCNC: 105 MMOL/L (ref 98–107)
CHLORIDE SERPL-SCNC: 105 MMOL/L (ref 98–107)
CHLORIDE SERPL-SCNC: 99 MMOL/L (ref 98–107)
CHLORIDE SERPL-SCNC: 99 MMOL/L (ref 98–107)
CHOLEST SERPL-MCNC: 83 MG/DL (ref 0–199)
CHOLESTEROL/HDL RATIO: 3
CK SERPL-CCNC: 1332 U/L (ref 0–325)
CK SERPL-CCNC: 1355 U/L (ref 0–325)
CLOT ANGLE.KAOLIN INDUCED BLD RES TEG: 44 DEG (ref 63–78)
CLOT INIT KAO IND P HEP NEUT BLD RES TEG: 11.7 MIN (ref 4.3–8.3)
CLOT INIT KAO IND P HEP NEUT BLD RES TEG: 6.6 MIN (ref 4.6–9.1)
CLOT INIT KAO IND P HEP NEUT BLD RES TEG: 8 MIN (ref 4.6–9.1)
CLOT INIT KAO IND P HEP NEUT BLD RES TEG: >17 MIN (ref 4.6–9.1)
CO2 SERPL-SCNC: 15 MMOL/L (ref 21–32)
CO2 SERPL-SCNC: 16 MMOL/L (ref 21–32)
CO2 SERPL-SCNC: 18 MMOL/L (ref 21–32)
CO2 SERPL-SCNC: 20 MMOL/L (ref 21–32)
CO2 SERPL-SCNC: 23 MMOL/L (ref 21–32)
CO2 SERPL-SCNC: 26 MMOL/L (ref 21–32)
CO2 SERPL-SCNC: 27 MMOL/L (ref 21–32)
COLOR UR: ABNORMAL
CREAT SERPL-MCNC: 1.41 MG/DL (ref 0.5–1.3)
CREAT SERPL-MCNC: 1.52 MG/DL (ref 0.5–1.3)
CREAT SERPL-MCNC: 1.73 MG/DL (ref 0.5–1.3)
CREAT SERPL-MCNC: 1.9 MG/DL (ref 0.5–1.3)
CREAT SERPL-MCNC: 1.92 MG/DL (ref 0.5–1.3)
CREAT SERPL-MCNC: 2 MG/DL (ref 0.5–1.3)
CREAT SERPL-MCNC: 2.26 MG/DL (ref 0.5–1.3)
CRITICAL CALL TIME: 302
CRITICAL CALL TIME: 400
CRITICAL CALL TIME: 817
CRITICAL CALLED BY: ABNORMAL
CRITICAL CALLED TO: ABNORMAL
CRITICAL READ BACK: ABNORMAL
DISPENSE STATUS: NORMAL
EGFRCR SERPLBLD CKD-EPI 2021: 32 ML/MIN/1.73M*2
EGFRCR SERPLBLD CKD-EPI 2021: 37 ML/MIN/1.73M*2
EGFRCR SERPLBLD CKD-EPI 2021: 39 ML/MIN/1.73M*2
EGFRCR SERPLBLD CKD-EPI 2021: 39 ML/MIN/1.73M*2
EGFRCR SERPLBLD CKD-EPI 2021: 44 ML/MIN/1.73M*2
EGFRCR SERPLBLD CKD-EPI 2021: 51 ML/MIN/1.73M*2
EGFRCR SERPLBLD CKD-EPI 2021: 56 ML/MIN/1.73M*2
EOSINOPHIL # BLD AUTO: 0 X10*3/UL (ref 0–0.7)
EOSINOPHIL # BLD AUTO: 0 X10*3/UL (ref 0–0.7)
EOSINOPHIL NFR BLD AUTO: 0 %
EOSINOPHIL NFR BLD AUTO: 0 %
ERYTHROCYTE [DISTWIDTH] IN BLOOD BY AUTOMATED COUNT: 13.2 % (ref 11.5–14.5)
ERYTHROCYTE [DISTWIDTH] IN BLOOD BY AUTOMATED COUNT: 13.4 % (ref 11.5–14.5)
ERYTHROCYTE [DISTWIDTH] IN BLOOD BY AUTOMATED COUNT: 13.7 % (ref 11.5–14.5)
ERYTHROCYTE [DISTWIDTH] IN BLOOD BY AUTOMATED COUNT: 14.5 % (ref 11.5–14.5)
ERYTHROCYTE [DISTWIDTH] IN BLOOD BY AUTOMATED COUNT: 15.9 % (ref 11.5–14.5)
ERYTHROCYTE [DISTWIDTH] IN BLOOD BY AUTOMATED COUNT: 15.9 % (ref 11.5–14.5)
ERYTHROCYTE [DISTWIDTH] IN BLOOD BY AUTOMATED COUNT: 16.8 % (ref 11.5–14.5)
ERYTHROCYTE [DISTWIDTH] IN BLOOD BY AUTOMATED COUNT: 17.2 % (ref 11.5–14.5)
EST. AVERAGE GLUCOSE BLD GHB EST-MCNC: 88 MG/DL
ETHANOL SERPL-MCNC: <10 MG/DL
FERRITIN SERPL-MCNC: 34 NG/ML (ref 20–300)
FIBRINOGEN BLD CALC-MCNC: 296 MG/DL (ref 278–581)
FIBRINOGEN PPP-MCNC: 170 MG/DL (ref 200–400)
FIBRINOGEN PPP-MCNC: 185 MG/DL (ref 200–400)
FIBRINOGEN PPP-MCNC: 211 MG/DL (ref 200–400)
GLUCOSE BLD MANUAL STRIP-MCNC: 105 MG/DL (ref 74–99)
GLUCOSE BLD MANUAL STRIP-MCNC: 155 MG/DL (ref 74–99)
GLUCOSE BLD MANUAL STRIP-MCNC: 170 MG/DL (ref 74–99)
GLUCOSE BLD MANUAL STRIP-MCNC: 178 MG/DL (ref 74–99)
GLUCOSE BLDA-MCNC: 150 MG/DL (ref 74–99)
GLUCOSE BLDA-MCNC: 153 MG/DL (ref 74–99)
GLUCOSE BLDA-MCNC: 158 MG/DL (ref 74–99)
GLUCOSE BLDA-MCNC: 170 MG/DL (ref 74–99)
GLUCOSE BLDA-MCNC: 176 MG/DL (ref 74–99)
GLUCOSE BLDA-MCNC: 200 MG/DL (ref 74–99)
GLUCOSE BLDA-MCNC: 209 MG/DL (ref 74–99)
GLUCOSE BLDA-MCNC: 215 MG/DL (ref 74–99)
GLUCOSE BLDA-MCNC: 222 MG/DL (ref 74–99)
GLUCOSE BLDA-MCNC: 223 MG/DL (ref 74–99)
GLUCOSE BLDA-MCNC: 231 MG/DL (ref 74–99)
GLUCOSE BLDV-MCNC: 149 MG/DL (ref 74–99)
GLUCOSE BLDV-MCNC: 151 MG/DL (ref 74–99)
GLUCOSE BLDV-MCNC: 179 MG/DL (ref 74–99)
GLUCOSE BLDV-MCNC: 304 MG/DL (ref 74–99)
GLUCOSE SERPL-MCNC: 124 MG/DL (ref 74–99)
GLUCOSE SERPL-MCNC: 151 MG/DL (ref 74–99)
GLUCOSE SERPL-MCNC: 167 MG/DL (ref 74–99)
GLUCOSE SERPL-MCNC: 169 MG/DL (ref 74–99)
GLUCOSE SERPL-MCNC: 170 MG/DL (ref 74–99)
GLUCOSE SERPL-MCNC: 230 MG/DL (ref 74–99)
GLUCOSE SERPL-MCNC: 326 MG/DL (ref 74–99)
GLUCOSE UR STRIP.AUTO-MCNC: NEGATIVE MG/DL
HBA1C MFR BLD: 4.7 %
HCO3 BLDA-SCNC: 13.2 MMOL/L (ref 22–26)
HCO3 BLDA-SCNC: 16.4 MMOL/L (ref 22–26)
HCO3 BLDA-SCNC: 17.4 MMOL/L (ref 22–26)
HCO3 BLDA-SCNC: 17.6 MMOL/L (ref 22–26)
HCO3 BLDA-SCNC: 18 MMOL/L (ref 22–26)
HCO3 BLDA-SCNC: 18.1 MMOL/L (ref 22–26)
HCO3 BLDA-SCNC: 18.8 MMOL/L (ref 22–26)
HCO3 BLDA-SCNC: 18.9 MMOL/L (ref 22–26)
HCO3 BLDA-SCNC: 19 MMOL/L
HCO3 BLDA-SCNC: 20.1 MMOL/L (ref 22–26)
HCO3 BLDA-SCNC: 21.8 MMOL/L (ref 22–26)
HCO3 BLDA-SCNC: 22 MMOL/L (ref 22–26)
HCO3 BLDA-SCNC: 22.5 MMOL/L (ref 22–26)
HCO3 BLDA-SCNC: 23.7 MMOL/L (ref 22–26)
HCO3 BLDA-SCNC: 26.2 MMOL/L (ref 22–26)
HCO3 BLDMV-SCNC: 27.9 MMOL/L (ref 22–26)
HCO3 BLDV-SCNC: 14 MMOL/L (ref 22–26)
HCO3 BLDV-SCNC: 23.1 MMOL/L
HCO3 BLDV-SCNC: 24.2 MMOL/L (ref 22–26)
HCO3 BLDV-SCNC: 26 MMOL/L (ref 22–26)
HCT VFR BLD AUTO: 16.8 % (ref 41–52)
HCT VFR BLD AUTO: 27.4 % (ref 41–52)
HCT VFR BLD AUTO: 32.2 % (ref 41–52)
HCT VFR BLD AUTO: 35.8 % (ref 41–52)
HCT VFR BLD AUTO: 37.9 % (ref 41–52)
HCT VFR BLD AUTO: 38.8 % (ref 41–52)
HCT VFR BLD AUTO: 39.6 % (ref 41–52)
HCT VFR BLD AUTO: 51 % (ref 41–52)
HCT VFR BLD EST: 12 % (ref 41–52)
HCT VFR BLD EST: 19 % (ref 41–52)
HCT VFR BLD EST: 20 % (ref 41–52)
HCT VFR BLD EST: 25 % (ref 41–52)
HCT VFR BLD EST: 27 % (ref 41–52)
HCT VFR BLD EST: 32 % (ref 41–52)
HCT VFR BLD EST: 34 % (ref 41–52)
HCT VFR BLD EST: 40 % (ref 41–52)
HCT VFR BLD EST: 41 % (ref 41–52)
HDLC SERPL-MCNC: 27.4 MG/DL
HGB BLD-MCNC: 10.6 G/DL (ref 13.5–17.5)
HGB BLD-MCNC: 11.9 G/DL (ref 13.5–17.5)
HGB BLD-MCNC: 12.8 G/DL (ref 13.5–17.5)
HGB BLD-MCNC: 13 G/DL (ref 13.5–17.5)
HGB BLD-MCNC: 13.2 G/DL (ref 13.5–17.5)
HGB BLD-MCNC: 16.6 G/DL (ref 13.5–17.5)
HGB BLD-MCNC: 5.3 G/DL (ref 13.5–17.5)
HGB BLD-MCNC: 8.6 G/DL (ref 13.5–17.5)
HGB BLDA-MCNC: 10.6 G/DL (ref 13.5–17.5)
HGB BLDA-MCNC: 10.8 G/DL (ref 13.5–17.5)
HGB BLDA-MCNC: 11.3 G/DL (ref 13.5–17.5)
HGB BLDA-MCNC: 11.4 G/DL (ref 13.5–17.5)
HGB BLDA-MCNC: 13.4 G/DL (ref 13.5–17.5)
HGB BLDA-MCNC: 13.5 G/DL (ref 13.5–17.5)
HGB BLDA-MCNC: 13.5 G/DL (ref 13.5–17.5)
HGB BLDA-MCNC: 4 G/DL (ref 13.5–17.5)
HGB BLDA-MCNC: 6.3 G/DL (ref 13.5–17.5)
HGB BLDA-MCNC: 6.8 G/DL (ref 13.5–17.5)
HGB BLDA-MCNC: 8.9 G/DL (ref 13.5–17.5)
HGB BLDV-MCNC: 10.5 G/DL (ref 13.5–17.5)
HGB BLDV-MCNC: 11.4 G/DL (ref 13.5–17.5)
HGB BLDV-MCNC: 13.5 G/DL (ref 13.5–17.5)
HGB BLDV-MCNC: 8.4 G/DL (ref 13.5–17.5)
HOLD SPECIMEN: NORMAL
HOLD SPECIMEN: NORMAL
IMM GRANULOCYTES # BLD AUTO: 0.19 X10*3/UL (ref 0–0.7)
IMM GRANULOCYTES # BLD AUTO: 0.28 X10*3/UL (ref 0–0.7)
IMM GRANULOCYTES NFR BLD AUTO: 0.7 % (ref 0–0.9)
IMM GRANULOCYTES NFR BLD AUTO: 1 % (ref 0–0.9)
INHALED O2 CONCENTRATION: 100 %
INHALED O2 CONCENTRATION: 36 %
INHALED O2 CONCENTRATION: 40 %
INHALED O2 CONCENTRATION: 60 %
INHALED O2 CONCENTRATION: 80 %
INR PPP: 1.3 (ref 0.9–1.1)
INR PPP: 1.4 (ref 0.9–1.1)
INR PPP: 1.4 (ref 0.9–1.1)
INR PPP: 2.1 (ref 0.9–1.1)
IRON SATN MFR SERPL: 26 % (ref 25–45)
IRON SERPL-MCNC: 88 UG/DL (ref 35–150)
KETONES UR STRIP.AUTO-MCNC: NEGATIVE MG/DL
LACTATE BLDA-SCNC: 1.8 MMOL/L (ref 0.4–2)
LACTATE BLDA-SCNC: 2.4 MMOL/L (ref 0.4–2)
LACTATE BLDA-SCNC: 2.5 MMOL/L (ref 0.4–2)
LACTATE BLDA-SCNC: 2.6 MMOL/L (ref 0.4–2)
LACTATE BLDA-SCNC: 3.1 MMOL/L (ref 0.4–2)
LACTATE BLDA-SCNC: 3.7 MMOL/L (ref 0.4–2)
LACTATE BLDA-SCNC: 3.7 MMOL/L (ref 0.4–2)
LACTATE BLDA-SCNC: 5.6 MMOL/L (ref 0.4–2)
LACTATE BLDA-SCNC: 6.4 MMOL/L (ref 0.4–2)
LACTATE BLDA-SCNC: 6.6 MMOL/L (ref 0.4–2)
LACTATE BLDA-SCNC: 9.3 MMOL/L (ref 0.4–2)
LACTATE BLDV-SCNC: 1.5 MMOL/L (ref 0.4–2)
LACTATE BLDV-SCNC: 1.5 MMOL/L (ref 0.4–2)
LACTATE BLDV-SCNC: 13.1 MMOL/L (ref 0.4–2)
LACTATE BLDV-SCNC: 2.2 MMOL/L (ref 0.4–2)
LACTATE BLDV-SCNC: 3.6 MMOL/L (ref 0.4–2)
LACTATE SERPL-SCNC: 1.5 MMOL/L (ref 0.4–2)
LACTATE SERPL-SCNC: 1.6 MMOL/L (ref 0.4–2)
LACTATE SERPL-SCNC: 1.7 MMOL/L (ref 0.4–2)
LACTATE SERPL-SCNC: 2.7 MMOL/L (ref 0.4–2)
LACTATE SERPL-SCNC: 3.3 MMOL/L (ref 0.4–2)
LDH SERPL L TO P-CCNC: 1465 U/L (ref 84–246)
LDH SERPL L TO P-CCNC: 576 U/L (ref 84–246)
LDLC SERPL CALC-MCNC: 40 MG/DL
LEFT VENTRICULAR OUTFLOW TRACT DIAMETER: 3.1 CM
LEUKOCYTE ESTERASE UR QL STRIP.AUTO: NEGATIVE
LYMPHOCYTES # BLD AUTO: 0.5 X10*3/UL (ref 1.2–4.8)
LYMPHOCYTES # BLD AUTO: 1.28 X10*3/UL (ref 1.2–4.8)
LYMPHOCYTES NFR BLD AUTO: 1.8 %
LYMPHOCYTES NFR BLD AUTO: 4.6 %
LYS KAO IND CLT 30M P MA LENFR BL RESTEG: 0 % (ref 0–2.6)
LYS KAO IND CLT 30M P MA LENFR BL RESTEG: 0 % (ref 0–2.6)
MA KAOLIN BLD RES TEG: 45 MM (ref 52–69)
MA KAOLIN+TF BLD RES TEG: 56 MM (ref 52–70)
MA KAOLIN+TF BLD RES TEG: <40 MM (ref 52–70)
MA KAOLIN+TF BLD RES TEG: <40 MM (ref 52–70)
MA TF IND+IIB-IIIA INH BLD RES TEG: 10 MM (ref 15–32)
MA TF IND+IIB-IIIA INH BLD RES TEG: 16 MM (ref 15–32)
MA TF IND+IIB-IIIA INH BLD RES TEG: 8 MM (ref 15–32)
MAGNESIUM SERPL-MCNC: 1.42 MG/DL (ref 1.6–2.4)
MAGNESIUM SERPL-MCNC: 1.51 MG/DL (ref 1.6–2.4)
MAGNESIUM SERPL-MCNC: 1.93 MG/DL (ref 1.6–2.4)
MAGNESIUM SERPL-MCNC: 2.12 MG/DL (ref 1.6–2.4)
MAGNESIUM SERPL-MCNC: 2.42 MG/DL (ref 1.6–2.4)
MAGNESIUM SERPL-MCNC: 2.99 MG/DL (ref 1.6–2.4)
MCH RBC QN AUTO: 28.4 PG (ref 26–34)
MCH RBC QN AUTO: 28.5 PG (ref 26–34)
MCH RBC QN AUTO: 28.6 PG (ref 26–34)
MCH RBC QN AUTO: 28.8 PG (ref 26–34)
MCH RBC QN AUTO: 29.2 PG (ref 26–34)
MCH RBC QN AUTO: 29.8 PG (ref 26–34)
MCHC RBC AUTO-ENTMCNC: 30.7 G/DL (ref 32–36)
MCHC RBC AUTO-ENTMCNC: 31.4 G/DL (ref 32–36)
MCHC RBC AUTO-ENTMCNC: 31.5 G/DL (ref 32–36)
MCHC RBC AUTO-ENTMCNC: 32.3 G/DL (ref 32–36)
MCHC RBC AUTO-ENTMCNC: 32.5 G/DL (ref 32–36)
MCHC RBC AUTO-ENTMCNC: 32.9 G/DL (ref 32–36)
MCHC RBC AUTO-ENTMCNC: 34.8 G/DL (ref 32–36)
MCHC RBC AUTO-ENTMCNC: 36.3 G/DL (ref 32–36)
MCV RBC AUTO: 78 FL (ref 80–100)
MCV RBC AUTO: 82 FL (ref 80–100)
MCV RBC AUTO: 88 FL (ref 80–100)
MCV RBC AUTO: 89 FL (ref 80–100)
MCV RBC AUTO: 90 FL (ref 80–100)
MCV RBC AUTO: 91 FL (ref 80–100)
MCV RBC AUTO: 91 FL (ref 80–100)
MCV RBC AUTO: 95 FL (ref 80–100)
MONOCYTES # BLD AUTO: 2.06 X10*3/UL (ref 0.1–1)
MONOCYTES # BLD AUTO: 2.25 X10*3/UL (ref 0.1–1)
MONOCYTES NFR BLD AUTO: 7.5 %
MONOCYTES NFR BLD AUTO: 8.1 %
MRSA DNA SPEC QL NAA+PROBE: NOT DETECTED
NEUTROPHILS # BLD AUTO: 23.97 X10*3/UL (ref 1.2–7.7)
NEUTROPHILS # BLD AUTO: 24.76 X10*3/UL (ref 1.2–7.7)
NEUTROPHILS NFR BLD AUTO: 86.5 %
NEUTROPHILS NFR BLD AUTO: 89.6 %
NITRITE UR QL STRIP.AUTO: NEGATIVE
NON HDL CHOLESTEROL: 56 MG/DL (ref 0–149)
NRBC BLD-RTO: 0 /100 WBCS (ref 0–0)
NRBC BLD-RTO: 0.1 /100 WBCS (ref 0–0)
NRBC BLD-RTO: 0.3 /100 WBCS (ref 0–0)
NRBC BLD-RTO: 0.6 /100 WBCS (ref 0–0)
NRBC BLD-RTO: 0.9 /100 WBCS (ref 0–0)
NRBC BLD-RTO: 2.2 /100 WBCS (ref 0–0)
OXYHGB MFR BLDA: 39.9 % (ref 94–98)
OXYHGB MFR BLDA: 90 % (ref 94–98)
OXYHGB MFR BLDA: 95.9 % (ref 94–98)
OXYHGB MFR BLDA: 96.3 % (ref 94–98)
OXYHGB MFR BLDA: 96.5 % (ref 94–98)
OXYHGB MFR BLDA: 96.6 % (ref 94–98)
OXYHGB MFR BLDA: 96.8 %
OXYHGB MFR BLDA: 96.8 % (ref 94–98)
OXYHGB MFR BLDA: 97.1 % (ref 94–98)
OXYHGB MFR BLDA: 97.1 % (ref 94–98)
OXYHGB MFR BLDA: 97.3 % (ref 94–98)
OXYHGB MFR BLDA: 97.3 % (ref 94–98)
OXYHGB MFR BLDA: 97.8 % (ref 94–98)
OXYHGB MFR BLDA: 98.1 % (ref 94–98)
OXYHGB MFR BLDA: 98.4 % (ref 94–98)
OXYHGB MFR BLDMV: 51.6 % (ref 45–75)
OXYHGB MFR BLDV: 41.2 % (ref 45–75)
OXYHGB MFR BLDV: 45.6 % (ref 45–75)
OXYHGB MFR BLDV: 70.5 %
OXYHGB MFR BLDV: 97.9 % (ref 45–75)
P AXIS: 71 DEGREES
P AXIS: 76 DEGREES
P AXIS: 82 DEGREES
P OFFSET: 152 MS
P OFFSET: 197 MS
P OFFSET: 199 MS
P ONSET: 113 MS
P ONSET: 155 MS
P ONSET: 157 MS
PCO2 BLDA: 28 MM HG (ref 38–42)
PCO2 BLDA: 30 MM HG
PCO2 BLDA: 31 MM HG (ref 38–42)
PCO2 BLDA: 32 MM HG (ref 38–42)
PCO2 BLDA: 32 MM HG (ref 38–42)
PCO2 BLDA: 34 MM HG (ref 38–42)
PCO2 BLDA: 36 MM HG (ref 38–42)
PCO2 BLDA: 38 MM HG (ref 38–42)
PCO2 BLDA: 39 MM HG (ref 38–42)
PCO2 BLDA: 41 MM HG (ref 38–42)
PCO2 BLDA: 41 MM HG (ref 38–42)
PCO2 BLDA: 44 MM HG (ref 38–42)
PCO2 BLDA: 47 MM HG (ref 38–42)
PCO2 BLDA: 57 MM HG (ref 38–42)
PCO2 BLDA: 59 MM HG (ref 38–42)
PCO2 BLDMV: 53 MM HG (ref 41–51)
PCO2 BLDV: 39 MM HG
PCO2 BLDV: 44 MM HG (ref 41–51)
PCO2 BLDV: 45 MM HG (ref 41–51)
PCO2 BLDV: 47 MM HG (ref 41–51)
PEEP CMH2O: 10 CM H2O
PEEP CMH2O: 10 CM H2O
PEEP CMH2O: 8 CM H2O
PEEP CMH2O: 8 CM H2O
PH BLDA: 7.15 PH (ref 7.38–7.42)
PH BLDA: 7.19 PH (ref 7.38–7.42)
PH BLDA: 7.21 PH (ref 7.38–7.42)
PH BLDA: 7.25 PH (ref 7.38–7.42)
PH BLDA: 7.27 PH (ref 7.38–7.42)
PH BLDA: 7.27 PH (ref 7.38–7.42)
PH BLDA: 7.28 PH (ref 7.38–7.42)
PH BLDA: 7.36 PH (ref 7.38–7.42)
PH BLDA: 7.36 PH (ref 7.38–7.42)
PH BLDA: 7.37 PH (ref 7.38–7.42)
PH BLDA: 7.38 PH (ref 7.38–7.42)
PH BLDA: 7.38 PH (ref 7.38–7.42)
PH BLDA: 7.39 PH (ref 7.38–7.42)
PH BLDA: 7.39 PH (ref 7.38–7.42)
PH BLDA: 7.41 PH
PH BLDMV: 7.33 PH (ref 7.33–7.43)
PH BLDV: 7.1 PH (ref 7.33–7.43)
PH BLDV: 7.32 PH (ref 7.33–7.43)
PH BLDV: 7.38 PH
PH BLDV: 7.38 PH (ref 7.33–7.43)
PH UR STRIP.AUTO: 6 [PH]
PHOSPHATE SERPL-MCNC: 3.8 MG/DL (ref 2.5–4.9)
PHOSPHATE SERPL-MCNC: 3.8 MG/DL (ref 2.5–4.9)
PHOSPHATE SERPL-MCNC: 4.5 MG/DL (ref 2.5–4.9)
PHOSPHATE SERPL-MCNC: 5.1 MG/DL (ref 2.5–4.9)
PHOSPHATE SERPL-MCNC: 6.8 MG/DL (ref 2.5–4.9)
PHOSPHATE SERPL-MCNC: 7.5 MG/DL (ref 2.5–4.9)
PLATELET # BLD AUTO: 169 X10*3/UL (ref 150–450)
PLATELET # BLD AUTO: 196 X10*3/UL (ref 150–450)
PLATELET # BLD AUTO: 213 X10*3/UL (ref 150–450)
PLATELET # BLD AUTO: 291 X10*3/UL (ref 150–450)
PLATELET # BLD AUTO: 33 X10*3/UL (ref 150–450)
PLATELET # BLD AUTO: 37 X10*3/UL (ref 150–450)
PLATELET # BLD AUTO: 47 X10*3/UL (ref 150–450)
PLATELET # BLD AUTO: 98 X10*3/UL (ref 150–450)
PO2 BLDA: 111 MM HG (ref 85–95)
PO2 BLDA: 113 MM HG (ref 85–95)
PO2 BLDA: 154 MM HG (ref 85–95)
PO2 BLDA: 163 MM HG (ref 85–95)
PO2 BLDA: 201 MM HG (ref 85–95)
PO2 BLDA: 205 MM HG (ref 85–95)
PO2 BLDA: 305 MM HG (ref 85–95)
PO2 BLDA: 32 MM HG (ref 85–95)
PO2 BLDA: 346 MM HG (ref 85–95)
PO2 BLDA: 380 MM HG (ref 85–95)
PO2 BLDA: 416 MM HG (ref 85–95)
PO2 BLDA: 426 MM HG (ref 85–95)
PO2 BLDA: 494 MM HG
PO2 BLDA: 73 MM HG (ref 85–95)
PO2 BLDA: 92 MM HG (ref 85–95)
PO2 BLDMV: 36 MM HG (ref 35–45)
PO2 BLDV: 237 MM HG (ref 35–45)
PO2 BLDV: 32 MM HG (ref 35–45)
PO2 BLDV: 35 MM HG (ref 35–45)
PO2 BLDV: 40 MM HG
POTASSIUM BLDA-SCNC: 3.5 MMOL/L (ref 3.5–5.3)
POTASSIUM BLDA-SCNC: 3.6 MMOL/L (ref 3.5–5.3)
POTASSIUM BLDA-SCNC: 4.3 MMOL/L (ref 3.5–5.3)
POTASSIUM BLDA-SCNC: 4.6 MMOL/L (ref 3.5–5.3)
POTASSIUM BLDA-SCNC: 4.8 MMOL/L (ref 3.5–5.3)
POTASSIUM BLDA-SCNC: 4.9 MMOL/L (ref 3.5–5.3)
POTASSIUM BLDA-SCNC: 5.2 MMOL/L (ref 3.5–5.3)
POTASSIUM BLDA-SCNC: 5.4 MMOL/L (ref 3.5–5.3)
POTASSIUM BLDA-SCNC: 5.7 MMOL/L (ref 3.5–5.3)
POTASSIUM BLDV-SCNC: 3.7 MMOL/L (ref 3.5–5.3)
POTASSIUM BLDV-SCNC: 3.8 MMOL/L (ref 3.5–5.3)
POTASSIUM BLDV-SCNC: 4.4 MMOL/L (ref 3.5–5.3)
POTASSIUM BLDV-SCNC: 5.4 MMOL/L (ref 3.5–5.3)
POTASSIUM SERPL-SCNC: 3.6 MMOL/L (ref 3.5–5.3)
POTASSIUM SERPL-SCNC: 4 MMOL/L (ref 3.5–5.3)
POTASSIUM SERPL-SCNC: 4.6 MMOL/L (ref 3.5–5.3)
POTASSIUM SERPL-SCNC: 4.8 MMOL/L (ref 3.5–5.3)
POTASSIUM SERPL-SCNC: 4.8 MMOL/L (ref 3.5–5.3)
POTASSIUM SERPL-SCNC: 5.1 MMOL/L (ref 3.5–5.3)
POTASSIUM SERPL-SCNC: 5.3 MMOL/L (ref 3.5–5.3)
PR INTERVAL: 138 MS
PR INTERVAL: 144 MS
PR INTERVAL: 148 MS
PROCALCITONIN SERPL-MCNC: 0.28 NG/ML
PRODUCT BLOOD TYPE: 5100
PRODUCT BLOOD TYPE: 600
PRODUCT BLOOD TYPE: 6200
PRODUCT BLOOD TYPE: 9500
PRODUCT CODE: NORMAL
PROT SERPL-MCNC: 5.5 G/DL (ref 6.4–8.2)
PROT SERPL-MCNC: 5.6 G/DL (ref 6.4–8.2)
PROT SERPL-MCNC: 6.7 G/DL (ref 6.4–8.2)
PROT UR STRIP.AUTO-MCNC: ABNORMAL MG/DL
PROTHROMBIN TIME: 14.2 SECONDS (ref 9.8–12.8)
PROTHROMBIN TIME: 14.4 SECONDS (ref 9.8–12.8)
PROTHROMBIN TIME: 15 SECONDS (ref 9.8–12.8)
PROTHROMBIN TIME: 15.6 SECONDS (ref 9.8–12.8)
PROTHROMBIN TIME: 16.1 SECONDS (ref 9.8–12.8)
PROTHROMBIN TIME: 24.1 SECONDS (ref 9.8–12.8)
Q ONSET: 185 MS
Q ONSET: 226 MS
Q ONSET: 229 MS
QRS COUNT: 19 BEATS
QRS COUNT: 20 BEATS
QRS COUNT: 20 BEATS
QRS DURATION: 144 MS
QRS DURATION: 150 MS
QRS DURATION: 160 MS
QT INTERVAL: 342 MS
QT INTERVAL: 356 MS
QT INTERVAL: 374 MS
QTC CALCULATION(BAZETT): 493 MS
QTC CALCULATION(BAZETT): 496 MS
QTC CALCULATION(BAZETT): 513 MS
QTC FREDERICIA: 436 MS
QTC FREDERICIA: 445 MS
QTC FREDERICIA: 461 MS
R AXIS: -74 DEGREES
R AXIS: -78 DEGREES
R AXIS: -81 DEGREES
RBC # BLD AUTO: 1.84 X10*6/UL (ref 4.5–5.9)
RBC # BLD AUTO: 3.02 X10*6/UL (ref 4.5–5.9)
RBC # BLD AUTO: 3.56 X10*6/UL (ref 4.5–5.9)
RBC # BLD AUTO: 4.08 X10*6/UL (ref 4.5–5.9)
RBC # BLD AUTO: 4.45 X10*6/UL (ref 4.5–5.9)
RBC # BLD AUTO: 4.57 X10*6/UL (ref 4.5–5.9)
RBC # BLD AUTO: 4.62 X10*6/UL (ref 4.5–5.9)
RBC # BLD AUTO: 5.77 X10*6/UL (ref 4.5–5.9)
RBC # UR STRIP.AUTO: ABNORMAL /UL
RBC #/AREA URNS AUTO: >20 /HPF
RH FACTOR (ANTIGEN D): NORMAL
RH FACTOR (ANTIGEN D): NORMAL
SALICYLATES SERPL-MCNC: <3 MG/DL
SAO2 % BLDA: 100 % (ref 94–100)
SAO2 % BLDA: 41 % (ref 94–100)
SAO2 % BLDA: 93 % (ref 94–100)
SAO2 % BLDA: 99 %
SAO2 % BLDA: 99 % (ref 94–100)
SAO2 % BLDMV: 52 % (ref 45–75)
SAO2 % BLDV: 100 % (ref 45–75)
SAO2 % BLDV: 42 % (ref 45–75)
SAO2 % BLDV: 46 % (ref 45–75)
SAO2 % BLDV: 73 %
SODIUM BLDA-SCNC: 130 MMOL/L (ref 136–145)
SODIUM BLDA-SCNC: 131 MMOL/L (ref 136–145)
SODIUM BLDA-SCNC: 132 MMOL/L (ref 136–145)
SODIUM BLDA-SCNC: 133 MMOL/L (ref 136–145)
SODIUM BLDA-SCNC: 133 MMOL/L (ref 136–145)
SODIUM BLDA-SCNC: 135 MMOL/L (ref 136–145)
SODIUM BLDA-SCNC: 136 MMOL/L (ref 136–145)
SODIUM BLDV-SCNC: 131 MMOL/L (ref 136–145)
SODIUM BLDV-SCNC: 135 MMOL/L (ref 136–145)
SODIUM BLDV-SCNC: 135 MMOL/L (ref 136–145)
SODIUM BLDV-SCNC: 136 MMOL/L (ref 136–145)
SODIUM SERPL-SCNC: 133 MMOL/L (ref 136–145)
SODIUM SERPL-SCNC: 133 MMOL/L (ref 136–145)
SODIUM SERPL-SCNC: 135 MMOL/L (ref 136–145)
SODIUM SERPL-SCNC: 136 MMOL/L (ref 136–145)
SODIUM SERPL-SCNC: 137 MMOL/L (ref 136–145)
SODIUM SERPL-SCNC: 138 MMOL/L (ref 136–145)
SODIUM SERPL-SCNC: 138 MMOL/L (ref 136–145)
SP GR UR STRIP.AUTO: 1.01
T AXIS: 77 DEGREES
T AXIS: 94 DEGREES
T AXIS: 94 DEGREES
T OFFSET: 372 MS
T OFFSET: 397 MS
T OFFSET: 407 MS
TIBC SERPL-MCNC: 338 UG/DL (ref 240–445)
TIDAL VOLUME: 450 ML
TIDAL VOLUME: 480 ML
TRIGL SERPL-MCNC: 78 MG/DL (ref 0–149)
UFH PPP CHRO-ACNC: 0.1 IU/ML
UFH PPP CHRO-ACNC: 0.2 IU/ML
UFH PPP CHRO-ACNC: 0.7 IU/ML
UFH PPP CHRO-ACNC: >2 IU/ML
UIBC SERPL-MCNC: 250 UG/DL (ref 110–370)
UNIT ABO: NORMAL
UNIT NUMBER: NORMAL
UNIT RH: NORMAL
UNIT VOLUME: 204
UNIT VOLUME: 217
UNIT VOLUME: 220
UNIT VOLUME: 221
UNIT VOLUME: 237
UNIT VOLUME: 277
UNIT VOLUME: 278
UNIT VOLUME: 279
UNIT VOLUME: 282
UNIT VOLUME: 283
UNIT VOLUME: 283
UNIT VOLUME: 290
UNIT VOLUME: 305
UNIT VOLUME: 318
UNIT VOLUME: 335
UNIT VOLUME: 350
UNIT VOLUME: 383
UNIT VOLUME: 86
UNIT VOLUME: 88
UNIT VOLUME: 91
UNIT VOLUME: 94
UROBILINOGEN UR STRIP.AUTO-MCNC: <2 MG/DL
VENTRICULAR RATE: 113 BPM
VENTRICULAR RATE: 117 BPM
VENTRICULAR RATE: 125 BPM
VLDL: 16 MG/DL (ref 0–40)
WBC # BLD AUTO: 12 X10*3/UL (ref 4.4–11.3)
WBC # BLD AUTO: 13.1 X10*3/UL (ref 4.4–11.3)
WBC # BLD AUTO: 27.6 X10*3/UL (ref 4.4–11.3)
WBC # BLD AUTO: 27.7 X10*3/UL (ref 4.4–11.3)
WBC # BLD AUTO: 28.8 X10*3/UL (ref 4.4–11.3)
WBC # BLD AUTO: 7.1 X10*3/UL (ref 4.4–11.3)
WBC # BLD AUTO: 7.4 X10*3/UL (ref 4.4–11.3)
WBC # BLD AUTO: 9.7 X10*3/UL (ref 4.4–11.3)
WBC #/AREA URNS AUTO: >50 /HPF
WBC CLUMPS #/AREA URNS AUTO: ABNORMAL /HPF
XM INTEP: NORMAL

## 2024-01-01 PROCEDURE — 85384 FIBRINOGEN ACTIVITY: CPT | Performed by: NURSE PRACTITIONER

## 2024-01-01 PROCEDURE — 85027 COMPLETE CBC AUTOMATED: CPT | Performed by: STUDENT IN AN ORGANIZED HEALTH CARE EDUCATION/TRAINING PROGRAM

## 2024-01-01 PROCEDURE — 99214 OFFICE O/P EST MOD 30 MIN: CPT | Performed by: INTERNAL MEDICINE

## 2024-01-01 PROCEDURE — 2500000004 HC RX 250 GENERAL PHARMACY W/ HCPCS (ALT 636 FOR OP/ED): Performed by: STUDENT IN AN ORGANIZED HEALTH CARE EDUCATION/TRAINING PROGRAM

## 2024-01-01 PROCEDURE — 94002 VENT MGMT INPAT INIT DAY: CPT

## 2024-01-01 PROCEDURE — 2500000004 HC RX 250 GENERAL PHARMACY W/ HCPCS (ALT 636 FOR OP/ED)

## 2024-01-01 PROCEDURE — B2151ZZ FLUOROSCOPY OF LEFT HEART USING LOW OSMOLAR CONTRAST: ICD-10-PCS | Performed by: STUDENT IN AN ORGANIZED HEALTH CARE EDUCATION/TRAINING PROGRAM

## 2024-01-01 PROCEDURE — 36556 INSERT NON-TUNNEL CV CATH: CPT | Mod: GC | Performed by: STUDENT IN AN ORGANIZED HEALTH CARE EDUCATION/TRAINING PROGRAM

## 2024-01-01 PROCEDURE — 84132 ASSAY OF SERUM POTASSIUM: CPT | Performed by: STUDENT IN AN ORGANIZED HEALTH CARE EDUCATION/TRAINING PROGRAM

## 2024-01-01 PROCEDURE — 85025 COMPLETE CBC W/AUTO DIFF WBC: CPT | Performed by: EMERGENCY MEDICINE

## 2024-01-01 PROCEDURE — 83550 IRON BINDING TEST: CPT

## 2024-01-01 PROCEDURE — 71045 X-RAY EXAM CHEST 1 VIEW: CPT | Performed by: RADIOLOGY

## 2024-01-01 PROCEDURE — 84132 ASSAY OF SERUM POTASSIUM: CPT | Performed by: NURSE PRACTITIONER

## 2024-01-01 PROCEDURE — 93325 DOPPLER ECHO COLOR FLOW MAPG: CPT | Performed by: INTERNAL MEDICINE

## 2024-01-01 PROCEDURE — 85610 PROTHROMBIN TIME: CPT | Performed by: STUDENT IN AN ORGANIZED HEALTH CARE EDUCATION/TRAINING PROGRAM

## 2024-01-01 PROCEDURE — 71275 CT ANGIOGRAPHY CHEST: CPT

## 2024-01-01 PROCEDURE — 2500000002 HC RX 250 W HCPCS SELF ADMINISTERED DRUGS (ALT 637 FOR MEDICARE OP, ALT 636 FOR OP/ED): Performed by: STUDENT IN AN ORGANIZED HEALTH CARE EDUCATION/TRAINING PROGRAM

## 2024-01-01 PROCEDURE — 85384 FIBRINOGEN ACTIVITY: CPT | Performed by: STUDENT IN AN ORGANIZED HEALTH CARE EDUCATION/TRAINING PROGRAM

## 2024-01-01 PROCEDURE — 83605 ASSAY OF LACTIC ACID: CPT | Performed by: STUDENT IN AN ORGANIZED HEALTH CARE EDUCATION/TRAINING PROGRAM

## 2024-01-01 PROCEDURE — 7100000009 HC PHASE TWO TIME - INITIAL BASE CHARGE

## 2024-01-01 PROCEDURE — 36558 INSERT TUNNELED CV CATH: CPT | Performed by: STUDENT IN AN ORGANIZED HEALTH CARE EDUCATION/TRAINING PROGRAM

## 2024-01-01 PROCEDURE — 99292 CRITICAL CARE ADDL 30 MIN: CPT | Mod: 25

## 2024-01-01 PROCEDURE — 71045 X-RAY EXAM CHEST 1 VIEW: CPT

## 2024-01-01 PROCEDURE — 99213 OFFICE O/P EST LOW 20 MIN: CPT | Performed by: INTERNAL MEDICINE

## 2024-01-01 PROCEDURE — 2500000005 HC RX 250 GENERAL PHARMACY W/O HCPCS

## 2024-01-01 PROCEDURE — 82330 ASSAY OF CALCIUM: CPT | Performed by: NURSE PRACTITIONER

## 2024-01-01 PROCEDURE — 7100000010 HC PHASE TWO TIME - EACH INCREMENTAL 1 MINUTE

## 2024-01-01 PROCEDURE — 5A1522G EXTRACORPOREAL OXYGENATION, MEMBRANE, PERIPHERAL VENO-ARTERIAL: ICD-10-PCS | Performed by: THORACIC SURGERY (CARDIOTHORACIC VASCULAR SURGERY)

## 2024-01-01 PROCEDURE — 0BJ08ZZ INSPECTION OF TRACHEOBRONCHIAL TREE, VIA NATURAL OR ARTIFICIAL OPENING ENDOSCOPIC: ICD-10-PCS | Performed by: STUDENT IN AN ORGANIZED HEALTH CARE EDUCATION/TRAINING PROGRAM

## 2024-01-01 PROCEDURE — 3074F SYST BP LT 130 MM HG: CPT | Performed by: INTERNAL MEDICINE

## 2024-01-01 PROCEDURE — C1725 CATH, TRANSLUMIN NON-LASER: HCPCS | Performed by: STUDENT IN AN ORGANIZED HEALTH CARE EDUCATION/TRAINING PROGRAM

## 2024-01-01 PROCEDURE — 36430 TRANSFUSION BLD/BLD COMPNT: CPT

## 2024-01-01 PROCEDURE — 2500000004 HC RX 250 GENERAL PHARMACY W/ HCPCS (ALT 636 FOR OP/ED): Performed by: EMERGENCY MEDICINE

## 2024-01-01 PROCEDURE — 5A2204Z RESTORATION OF CARDIAC RHYTHM, SINGLE: ICD-10-PCS | Performed by: STUDENT IN AN ORGANIZED HEALTH CARE EDUCATION/TRAINING PROGRAM

## 2024-01-01 PROCEDURE — 3008F BODY MASS INDEX DOCD: CPT | Performed by: INTERNAL MEDICINE

## 2024-01-01 PROCEDURE — 3078F DIAST BP <80 MM HG: CPT | Performed by: INTERNAL MEDICINE

## 2024-01-01 PROCEDURE — 7100000001 HC RECOVERY ROOM TIME - INITIAL BASE CHARGE

## 2024-01-01 PROCEDURE — 31622 DX BRONCHOSCOPE/WASH: CPT | Performed by: STUDENT IN AN ORGANIZED HEALTH CARE EDUCATION/TRAINING PROGRAM

## 2024-01-01 PROCEDURE — 80143 DRUG ASSAY ACETAMINOPHEN: CPT | Performed by: STUDENT IN AN ORGANIZED HEALTH CARE EDUCATION/TRAINING PROGRAM

## 2024-01-01 PROCEDURE — 83735 ASSAY OF MAGNESIUM: CPT | Performed by: HOSPITALIST

## 2024-01-01 PROCEDURE — 92929 PR PRQ TRLUML CORONARY STENT W/ANGIO ADDL ART/BRNCH: CPT | Performed by: STUDENT IN AN ORGANIZED HEALTH CARE EDUCATION/TRAINING PROGRAM

## 2024-01-01 PROCEDURE — 74018 RADEX ABDOMEN 1 VIEW: CPT | Performed by: RADIOLOGY

## 2024-01-01 PROCEDURE — 02HA3RZ INSERTION OF SHORT-TERM EXTERNAL HEART ASSIST SYSTEM INTO HEART, PERCUTANEOUS APPROACH: ICD-10-PCS | Performed by: STUDENT IN AN ORGANIZED HEALTH CARE EDUCATION/TRAINING PROGRAM

## 2024-01-01 PROCEDURE — 84100 ASSAY OF PHOSPHORUS: CPT | Performed by: STUDENT IN AN ORGANIZED HEALTH CARE EDUCATION/TRAINING PROGRAM

## 2024-01-01 PROCEDURE — 33947 ECMO/ECLS INITIATION ARTERY: CPT | Performed by: THORACIC SURGERY (CARDIOTHORACIC VASCULAR SURGERY)

## 2024-01-01 PROCEDURE — 87641 MR-STAPH DNA AMP PROBE: CPT | Performed by: HOSPITALIST

## 2024-01-01 PROCEDURE — 92960 CARDIOVERSION ELECTRIC EXT: CPT

## 2024-01-01 PROCEDURE — 4A133B3 MONITORING OF ARTERIAL PRESSURE, PULMONARY, PERCUTANEOUS APPROACH: ICD-10-PCS | Performed by: STUDENT IN AN ORGANIZED HEALTH CARE EDUCATION/TRAINING PROGRAM

## 2024-01-01 PROCEDURE — 2500000005 HC RX 250 GENERAL PHARMACY W/O HCPCS: Performed by: STUDENT IN AN ORGANIZED HEALTH CARE EDUCATION/TRAINING PROGRAM

## 2024-01-01 PROCEDURE — 37799 UNLISTED PX VASCULAR SURGERY: CPT | Performed by: EMERGENCY MEDICINE

## 2024-01-01 PROCEDURE — 83735 ASSAY OF MAGNESIUM: CPT | Performed by: STUDENT IN AN ORGANIZED HEALTH CARE EDUCATION/TRAINING PROGRAM

## 2024-01-01 PROCEDURE — 2500000004 HC RX 250 GENERAL PHARMACY W/ HCPCS (ALT 636 FOR OP/ED): Performed by: PHYSICIAN ASSISTANT

## 2024-01-01 PROCEDURE — 93799 UNLISTED CV SVC/PROCEDURE: CPT | Performed by: STUDENT IN AN ORGANIZED HEALTH CARE EDUCATION/TRAINING PROGRAM

## 2024-01-01 PROCEDURE — 99291 CRITICAL CARE FIRST HOUR: CPT | Performed by: HOSPITALIST

## 2024-01-01 PROCEDURE — C1757 CATH, THROMBECTOMY/EMBOLECT: HCPCS | Performed by: STUDENT IN AN ORGANIZED HEALTH CARE EDUCATION/TRAINING PROGRAM

## 2024-01-01 PROCEDURE — 74018 RADEX ABDOMEN 1 VIEW: CPT

## 2024-01-01 PROCEDURE — C1887 CATHETER, GUIDING: HCPCS | Performed by: STUDENT IN AN ORGANIZED HEALTH CARE EDUCATION/TRAINING PROGRAM

## 2024-01-01 PROCEDURE — 51702 INSERT TEMP BLADDER CATH: CPT

## 2024-01-01 PROCEDURE — 85027 COMPLETE CBC AUTOMATED: CPT

## 2024-01-01 PROCEDURE — 36415 COLL VENOUS BLD VENIPUNCTURE: CPT

## 2024-01-01 PROCEDURE — 2020000001 HC ICU ROOM DAILY

## 2024-01-01 PROCEDURE — 3E033XZ INTRODUCTION OF VASOPRESSOR INTO PERIPHERAL VEIN, PERCUTANEOUS APPROACH: ICD-10-PCS

## 2024-01-01 PROCEDURE — 82248 BILIRUBIN DIRECT: CPT | Performed by: STUDENT IN AN ORGANIZED HEALTH CARE EDUCATION/TRAINING PROGRAM

## 2024-01-01 PROCEDURE — 99153 MOD SED SAME PHYS/QHP EA: CPT | Performed by: STUDENT IN AN ORGANIZED HEALTH CARE EDUCATION/TRAINING PROGRAM

## 2024-01-01 PROCEDURE — 93460 R&L HRT ART/VENTRICLE ANGIO: CPT | Performed by: STUDENT IN AN ORGANIZED HEALTH CARE EDUCATION/TRAINING PROGRAM

## 2024-01-01 PROCEDURE — 84145 PROCALCITONIN (PCT): CPT | Mod: ELYLAB

## 2024-01-01 PROCEDURE — 45390 COLONOSCOPY W/RESECTION: CPT | Mod: 59 | Performed by: INTERNAL MEDICINE

## 2024-01-01 PROCEDURE — 71275 CT ANGIOGRAPHY CHEST: CPT | Performed by: RADIOLOGY

## 2024-01-01 PROCEDURE — 84484 ASSAY OF TROPONIN QUANT: CPT | Performed by: STUDENT IN AN ORGANIZED HEALTH CARE EDUCATION/TRAINING PROGRAM

## 2024-01-01 PROCEDURE — 93308 TTE F-UP OR LMTD: CPT

## 2024-01-01 PROCEDURE — 02C03ZZ EXTIRPATION OF MATTER FROM CORONARY ARTERY, ONE ARTERY, PERCUTANEOUS APPROACH: ICD-10-PCS | Performed by: STUDENT IN AN ORGANIZED HEALTH CARE EDUCATION/TRAINING PROGRAM

## 2024-01-01 PROCEDURE — 5A1935Z RESPIRATORY VENTILATION, LESS THAN 24 CONSECUTIVE HOURS: ICD-10-PCS

## 2024-01-01 PROCEDURE — 93000 ELECTROCARDIOGRAM COMPLETE: CPT | Performed by: INTERNAL MEDICINE

## 2024-01-01 PROCEDURE — 99291 CRITICAL CARE FIRST HOUR: CPT | Performed by: EMERGENCY MEDICINE

## 2024-01-01 PROCEDURE — 2550000001 HC RX 255 CONTRASTS: Performed by: STUDENT IN AN ORGANIZED HEALTH CARE EDUCATION/TRAINING PROGRAM

## 2024-01-01 PROCEDURE — 2720000007 HC OR 272 NO HCPCS

## 2024-01-01 PROCEDURE — 83880 ASSAY OF NATRIURETIC PEPTIDE: CPT | Performed by: PHYSICIAN ASSISTANT

## 2024-01-01 PROCEDURE — 85347 COAGULATION TIME ACTIVATED: CPT

## 2024-01-01 PROCEDURE — 93308 TTE F-UP OR LMTD: CPT | Performed by: INTERNAL MEDICINE

## 2024-01-01 PROCEDURE — C1874 STENT, COATED/COV W/DEL SYS: HCPCS | Performed by: STUDENT IN AN ORGANIZED HEALTH CARE EDUCATION/TRAINING PROGRAM

## 2024-01-01 PROCEDURE — 93880 EXTRACRANIAL BILAT STUDY: CPT | Performed by: INTERNAL MEDICINE

## 2024-01-01 PROCEDURE — 2720000007 HC OR 272 NO HCPCS: Performed by: STUDENT IN AN ORGANIZED HEALTH CARE EDUCATION/TRAINING PROGRAM

## 2024-01-01 PROCEDURE — 83615 LACTATE (LD) (LDH) ENZYME: CPT | Performed by: NURSE PRACTITIONER

## 2024-01-01 PROCEDURE — 2500000002 HC RX 250 W HCPCS SELF ADMINISTERED DRUGS (ALT 637 FOR MEDICARE OP, ALT 636 FOR OP/ED): Performed by: NURSE PRACTITIONER

## 2024-01-01 PROCEDURE — C1889 IMPLANT/INSERT DEVICE, NOC: HCPCS | Performed by: STUDENT IN AN ORGANIZED HEALTH CARE EDUCATION/TRAINING PROGRAM

## 2024-01-01 PROCEDURE — 5A0221D ASSISTANCE WITH CARDIAC OUTPUT USING IMPELLER PUMP, CONTINUOUS: ICD-10-PCS | Performed by: STUDENT IN AN ORGANIZED HEALTH CARE EDUCATION/TRAINING PROGRAM

## 2024-01-01 PROCEDURE — 93005 ELECTROCARDIOGRAM TRACING: CPT

## 2024-01-01 PROCEDURE — 5A09357 ASSISTANCE WITH RESPIRATORY VENTILATION, LESS THAN 24 CONSECUTIVE HOURS, CONTINUOUS POSITIVE AIRWAY PRESSURE: ICD-10-PCS | Performed by: STUDENT IN AN ORGANIZED HEALTH CARE EDUCATION/TRAINING PROGRAM

## 2024-01-01 PROCEDURE — 99223 1ST HOSP IP/OBS HIGH 75: CPT | Performed by: INTERNAL MEDICINE

## 2024-01-01 PROCEDURE — B2111ZZ FLUOROSCOPY OF MULTIPLE CORONARY ARTERIES USING LOW OSMOLAR CONTRAST: ICD-10-PCS | Performed by: STUDENT IN AN ORGANIZED HEALTH CARE EDUCATION/TRAINING PROGRAM

## 2024-01-01 PROCEDURE — C1769 GUIDE WIRE: HCPCS | Performed by: STUDENT IN AN ORGANIZED HEALTH CARE EDUCATION/TRAINING PROGRAM

## 2024-01-01 PROCEDURE — 92978 ENDOLUMINL IVUS OCT C 1ST: CPT | Performed by: STUDENT IN AN ORGANIZED HEALTH CARE EDUCATION/TRAINING PROGRAM

## 2024-01-01 PROCEDURE — 80179 DRUG ASSAY SALICYLATE: CPT | Performed by: STUDENT IN AN ORGANIZED HEALTH CARE EDUCATION/TRAINING PROGRAM

## 2024-01-01 PROCEDURE — 2500000001 HC RX 250 WO HCPCS SELF ADMINISTERED DRUGS (ALT 637 FOR MEDICARE OP): Performed by: STUDENT IN AN ORGANIZED HEALTH CARE EDUCATION/TRAINING PROGRAM

## 2024-01-01 PROCEDURE — 83615 LACTATE (LD) (LDH) ENZYME: CPT | Performed by: STUDENT IN AN ORGANIZED HEALTH CARE EDUCATION/TRAINING PROGRAM

## 2024-01-01 PROCEDURE — 93880 EXTRACRANIAL BILAT STUDY: CPT

## 2024-01-01 PROCEDURE — 31622 DX BRONCHOSCOPE/WASH: CPT

## 2024-01-01 PROCEDURE — 86923 COMPATIBILITY TEST ELECTRIC: CPT

## 2024-01-01 PROCEDURE — 82330 ASSAY OF CALCIUM: CPT | Performed by: STUDENT IN AN ORGANIZED HEALTH CARE EDUCATION/TRAINING PROGRAM

## 2024-01-01 PROCEDURE — 84132 ASSAY OF SERUM POTASSIUM: CPT

## 2024-01-01 PROCEDURE — 99291 CRITICAL CARE FIRST HOUR: CPT | Mod: 25

## 2024-01-01 PROCEDURE — 84100 ASSAY OF PHOSPHORUS: CPT | Performed by: HOSPITALIST

## 2024-01-01 PROCEDURE — 85610 PROTHROMBIN TIME: CPT | Performed by: NURSE PRACTITIONER

## 2024-01-01 PROCEDURE — 94640 AIRWAY INHALATION TREATMENT: CPT

## 2024-01-01 PROCEDURE — 94660 CPAP INITIATION&MGMT: CPT

## 2024-01-01 PROCEDURE — 85610 PROTHROMBIN TIME: CPT | Performed by: THORACIC SURGERY (CARDIOTHORACIC VASCULAR SURGERY)

## 2024-01-01 PROCEDURE — 33990 INSJ PERQ VAD L HRT ARTERIAL: CPT | Performed by: STUDENT IN AN ORGANIZED HEALTH CARE EDUCATION/TRAINING PROGRAM

## 2024-01-01 PROCEDURE — 82435 ASSAY OF BLOOD CHLORIDE: CPT

## 2024-01-01 PROCEDURE — 0BH17EZ INSERTION OF ENDOTRACHEAL AIRWAY INTO TRACHEA, VIA NATURAL OR ARTIFICIAL OPENING: ICD-10-PCS

## 2024-01-01 PROCEDURE — 85025 COMPLETE CBC W/AUTO DIFF WBC: CPT | Performed by: PHYSICIAN ASSISTANT

## 2024-01-01 PROCEDURE — 82805 BLOOD GASES W/O2 SATURATION: CPT | Performed by: STUDENT IN AN ORGANIZED HEALTH CARE EDUCATION/TRAINING PROGRAM

## 2024-01-01 PROCEDURE — 36415 COLL VENOUS BLD VENIPUNCTURE: CPT | Performed by: PHYSICIAN ASSISTANT

## 2024-01-01 PROCEDURE — 84132 ASSAY OF SERUM POTASSIUM: CPT | Performed by: THORACIC SURGERY (CARDIOTHORACIC VASCULAR SURGERY)

## 2024-01-01 PROCEDURE — 87086 URINE CULTURE/COLONY COUNT: CPT | Mod: ELYLAB | Performed by: STUDENT IN AN ORGANIZED HEALTH CARE EDUCATION/TRAINING PROGRAM

## 2024-01-01 PROCEDURE — 31500 INSERT EMERGENCY AIRWAY: CPT

## 2024-01-01 PROCEDURE — 84295 ASSAY OF SERUM SODIUM: CPT | Performed by: EMERGENCY MEDICINE

## 2024-01-01 PROCEDURE — 86901 BLOOD TYPING SEROLOGIC RH(D): CPT | Performed by: STUDENT IN AN ORGANIZED HEALTH CARE EDUCATION/TRAINING PROGRAM

## 2024-01-01 PROCEDURE — 99152 MOD SED SAME PHYS/QHP 5/>YRS: CPT | Performed by: STUDENT IN AN ORGANIZED HEALTH CARE EDUCATION/TRAINING PROGRAM

## 2024-01-01 PROCEDURE — 36558 INSERT TUNNELED CV CATH: CPT | Mod: GC | Performed by: STUDENT IN AN ORGANIZED HEALTH CARE EDUCATION/TRAINING PROGRAM

## 2024-01-01 PROCEDURE — C9606 PERC D-E COR REVASC W AMI S: HCPCS | Performed by: STUDENT IN AN ORGANIZED HEALTH CARE EDUCATION/TRAINING PROGRAM

## 2024-01-01 PROCEDURE — 83605 ASSAY OF LACTIC ACID: CPT

## 2024-01-01 PROCEDURE — 83540 ASSAY OF IRON: CPT

## 2024-01-01 PROCEDURE — 36620 INSERTION CATHETER ARTERY: CPT

## 2024-01-01 PROCEDURE — 3700000001 HC GENERAL ANESTHESIA TIME - INITIAL BASE CHARGE

## 2024-01-01 PROCEDURE — C1751 CATH, INF, PER/CENT/MIDLINE: HCPCS | Performed by: STUDENT IN AN ORGANIZED HEALTH CARE EDUCATION/TRAINING PROGRAM

## 2024-01-01 PROCEDURE — 87040 BLOOD CULTURE FOR BACTERIA: CPT | Mod: ELYLAB | Performed by: STUDENT IN AN ORGANIZED HEALTH CARE EDUCATION/TRAINING PROGRAM

## 2024-01-01 PROCEDURE — 85520 HEPARIN ASSAY: CPT | Performed by: STUDENT IN AN ORGANIZED HEALTH CARE EDUCATION/TRAINING PROGRAM

## 2024-01-01 PROCEDURE — 94003 VENT MGMT INPAT SUBQ DAY: CPT

## 2024-01-01 PROCEDURE — 84132 ASSAY OF SERUM POTASSIUM: CPT | Performed by: EMERGENCY MEDICINE

## 2024-01-01 PROCEDURE — 85347 COAGULATION TIME ACTIVATED: CPT | Performed by: STUDENT IN AN ORGANIZED HEALTH CARE EDUCATION/TRAINING PROGRAM

## 2024-01-01 PROCEDURE — 02703ZZ DILATION OF CORONARY ARTERY, ONE ARTERY, PERCUTANEOUS APPROACH: ICD-10-PCS | Performed by: STUDENT IN AN ORGANIZED HEALTH CARE EDUCATION/TRAINING PROGRAM

## 2024-01-01 PROCEDURE — 2500000004 HC RX 250 GENERAL PHARMACY W/ HCPCS (ALT 636 FOR OP/ED): Performed by: HOSPITALIST

## 2024-01-01 PROCEDURE — 82947 ASSAY GLUCOSE BLOOD QUANT: CPT

## 2024-01-01 PROCEDURE — 99291 CRITICAL CARE FIRST HOUR: CPT | Performed by: STUDENT IN AN ORGANIZED HEALTH CARE EDUCATION/TRAINING PROGRAM

## 2024-01-01 PROCEDURE — 36600 WITHDRAWAL OF ARTERIAL BLOOD: CPT

## 2024-01-01 PROCEDURE — 2500000004 HC RX 250 GENERAL PHARMACY W/ HCPCS (ALT 636 FOR OP/ED): Performed by: NURSE ANESTHETIST, CERTIFIED REGISTERED

## 2024-01-01 PROCEDURE — 99291 CRITICAL CARE FIRST HOUR: CPT | Performed by: INTERNAL MEDICINE

## 2024-01-01 PROCEDURE — 93325 DOPPLER ECHO COLOR FLOW MAPG: CPT

## 2024-01-01 PROCEDURE — 82330 ASSAY OF CALCIUM: CPT | Performed by: THORACIC SURGERY (CARDIOTHORACIC VASCULAR SURGERY)

## 2024-01-01 PROCEDURE — 93010 ELECTROCARDIOGRAM REPORT: CPT | Performed by: INTERNAL MEDICINE

## 2024-01-01 PROCEDURE — 82728 ASSAY OF FERRITIN: CPT

## 2024-01-01 PROCEDURE — 80061 LIPID PANEL: CPT

## 2024-01-01 PROCEDURE — P9016 RBC LEUKOCYTES REDUCED: HCPCS

## 2024-01-01 PROCEDURE — C1753 CATH, INTRAVAS ULTRASOUND: HCPCS | Performed by: STUDENT IN AN ORGANIZED HEALTH CARE EDUCATION/TRAINING PROGRAM

## 2024-01-01 PROCEDURE — 33949 ECMO/ECLS DAILY MGMT ARTERY: CPT | Performed by: EMERGENCY MEDICINE

## 2024-01-01 PROCEDURE — 80053 COMPREHEN METABOLIC PANEL: CPT | Performed by: STUDENT IN AN ORGANIZED HEALTH CARE EDUCATION/TRAINING PROGRAM

## 2024-01-01 PROCEDURE — 84132 ASSAY OF SERUM POTASSIUM: CPT | Performed by: PHYSICIAN ASSISTANT

## 2024-01-01 PROCEDURE — 36415 COLL VENOUS BLD VENIPUNCTURE: CPT | Performed by: STUDENT IN AN ORGANIZED HEALTH CARE EDUCATION/TRAINING PROGRAM

## 2024-01-01 PROCEDURE — 74174 CTA ABD&PLVS W/CONTRAST: CPT | Performed by: RADIOLOGY

## 2024-01-01 PROCEDURE — G0278 ILIAC ART ANGIO,CARDIAC CATH: HCPCS | Performed by: STUDENT IN AN ORGANIZED HEALTH CARE EDUCATION/TRAINING PROGRAM

## 2024-01-01 PROCEDURE — 2500000004 HC RX 250 GENERAL PHARMACY W/ HCPCS (ALT 636 FOR OP/ED): Mod: JZ | Performed by: STUDENT IN AN ORGANIZED HEALTH CARE EDUCATION/TRAINING PROGRAM

## 2024-01-01 PROCEDURE — 3E033XZ INTRODUCTION OF VASOPRESSOR INTO PERIPHERAL VEIN, PERCUTANEOUS APPROACH: ICD-10-PCS | Performed by: THORACIC SURGERY (CARDIOTHORACIC VASCULAR SURGERY)

## 2024-01-01 PROCEDURE — 99239 HOSP IP/OBS DSCHRG MGMT >30: CPT

## 2024-01-01 PROCEDURE — 84484 ASSAY OF TROPONIN QUANT: CPT | Performed by: PHYSICIAN ASSISTANT

## 2024-01-01 PROCEDURE — 37799 UNLISTED PX VASCULAR SURGERY: CPT | Performed by: STUDENT IN AN ORGANIZED HEALTH CARE EDUCATION/TRAINING PROGRAM

## 2024-01-01 PROCEDURE — 85027 COMPLETE CBC AUTOMATED: CPT | Performed by: NURSE PRACTITIONER

## 2024-01-01 PROCEDURE — P9073 PLATELETS PHERESIS PATH REDU: HCPCS

## 2024-01-01 PROCEDURE — 81001 URINALYSIS AUTO W/SCOPE: CPT | Performed by: STUDENT IN AN ORGANIZED HEALTH CARE EDUCATION/TRAINING PROGRAM

## 2024-01-01 PROCEDURE — 2500000002 HC RX 250 W HCPCS SELF ADMINISTERED DRUGS (ALT 637 FOR MEDICARE OP, ALT 636 FOR OP/ED)

## 2024-01-01 PROCEDURE — P9017 PLASMA 1 DONOR FRZ W/IN 8 HR: HCPCS

## 2024-01-01 PROCEDURE — 83735 ASSAY OF MAGNESIUM: CPT | Performed by: NURSE PRACTITIONER

## 2024-01-01 PROCEDURE — 7100000002 HC RECOVERY ROOM TIME - EACH INCREMENTAL 1 MINUTE

## 2024-01-01 PROCEDURE — 99221 1ST HOSP IP/OBS SF/LOW 40: CPT | Performed by: SURGERY

## 2024-01-01 PROCEDURE — 96374 THER/PROPH/DIAG INJ IV PUSH: CPT | Mod: 59

## 2024-01-01 PROCEDURE — 94760 N-INVAS EAR/PLS OXIMETRY 1: CPT

## 2024-01-01 PROCEDURE — C1894 INTRO/SHEATH, NON-LASER: HCPCS | Performed by: STUDENT IN AN ORGANIZED HEALTH CARE EDUCATION/TRAINING PROGRAM

## 2024-01-01 PROCEDURE — 36600 WITHDRAWAL OF ARTERIAL BLOOD: CPT | Performed by: STUDENT IN AN ORGANIZED HEALTH CARE EDUCATION/TRAINING PROGRAM

## 2024-01-01 PROCEDURE — 2500000001 HC RX 250 WO HCPCS SELF ADMINISTERED DRUGS (ALT 637 FOR MEDICARE OP): Performed by: INTERNAL MEDICINE

## 2024-01-01 PROCEDURE — 33947 ECMO/ECLS INITIATION ARTERY: CPT | Performed by: STUDENT IN AN ORGANIZED HEALTH CARE EDUCATION/TRAINING PROGRAM

## 2024-01-01 PROCEDURE — 84484 ASSAY OF TROPONIN QUANT: CPT

## 2024-01-01 PROCEDURE — 83036 HEMOGLOBIN GLYCOSYLATED A1C: CPT | Mod: ELYLAB

## 2024-01-01 PROCEDURE — 2500000001 HC RX 250 WO HCPCS SELF ADMINISTERED DRUGS (ALT 637 FOR MEDICARE OP)

## 2024-01-01 PROCEDURE — 02HP32Z INSERTION OF MONITORING DEVICE INTO PULMONARY TRUNK, PERCUTANEOUS APPROACH: ICD-10-PCS | Performed by: STUDENT IN AN ORGANIZED HEALTH CARE EDUCATION/TRAINING PROGRAM

## 2024-01-01 PROCEDURE — 4A1239Z MONITORING OF CARDIAC OUTPUT, PERCUTANEOUS APPROACH: ICD-10-PCS | Performed by: STUDENT IN AN ORGANIZED HEALTH CARE EDUCATION/TRAINING PROGRAM

## 2024-01-01 PROCEDURE — 85520 HEPARIN ASSAY: CPT

## 2024-01-01 PROCEDURE — 82550 ASSAY OF CK (CPK): CPT | Performed by: STUDENT IN AN ORGANIZED HEALTH CARE EDUCATION/TRAINING PROGRAM

## 2024-01-01 PROCEDURE — 83735 ASSAY OF MAGNESIUM: CPT | Performed by: THORACIC SURGERY (CARDIOTHORACIC VASCULAR SURGERY)

## 2024-01-01 PROCEDURE — P9037 PLATE PHERES LEUKOREDU IRRAD: HCPCS

## 2024-01-01 PROCEDURE — 2500000004 HC RX 250 GENERAL PHARMACY W/ HCPCS (ALT 636 FOR OP/ED): Performed by: NURSE PRACTITIONER

## 2024-01-01 PROCEDURE — 96361 HYDRATE IV INFUSION ADD-ON: CPT | Mod: 59

## 2024-01-01 PROCEDURE — 4A023N8 MEASUREMENT OF CARDIAC SAMPLING AND PRESSURE, BILATERAL, PERCUTANEOUS APPROACH: ICD-10-PCS | Performed by: STUDENT IN AN ORGANIZED HEALTH CARE EDUCATION/TRAINING PROGRAM

## 2024-01-01 PROCEDURE — 85018 HEMOGLOBIN: CPT | Performed by: EMERGENCY MEDICINE

## 2024-01-01 PROCEDURE — 92941 PRQ TRLML REVSC TOT OCCL AMI: CPT | Performed by: STUDENT IN AN ORGANIZED HEALTH CARE EDUCATION/TRAINING PROGRAM

## 2024-01-01 PROCEDURE — 96375 TX/PRO/DX INJ NEW DRUG ADDON: CPT | Mod: 59

## 2024-01-01 PROCEDURE — 2780000003 HC OR 278 NO HCPCS: Performed by: STUDENT IN AN ORGANIZED HEALTH CARE EDUCATION/TRAINING PROGRAM

## 2024-01-01 PROCEDURE — 027135Z DILATION OF CORONARY ARTERY, TWO ARTERIES WITH TWO DRUG-ELUTING INTRALUMINAL DEVICES, PERCUTANEOUS APPROACH: ICD-10-PCS | Performed by: STUDENT IN AN ORGANIZED HEALTH CARE EDUCATION/TRAINING PROGRAM

## 2024-01-01 PROCEDURE — 93750 INTERROGATION VAD IN PERSON: CPT | Performed by: EMERGENCY MEDICINE

## 2024-01-01 PROCEDURE — 85610 PROTHROMBIN TIME: CPT | Performed by: PHYSICIAN ASSISTANT

## 2024-01-01 PROCEDURE — 3700000002 HC GENERAL ANESTHESIA TIME - EACH INCREMENTAL 1 MINUTE

## 2024-01-01 PROCEDURE — 5A1945Z RESPIRATORY VENTILATION, 24-96 CONSECUTIVE HOURS: ICD-10-PCS | Performed by: THORACIC SURGERY (CARDIOTHORACIC VASCULAR SURGERY)

## 2024-01-01 PROCEDURE — 2500000002 HC RX 250 W HCPCS SELF ADMINISTERED DRUGS (ALT 637 FOR MEDICARE OP, ALT 636 FOR OP/ED): Performed by: HOSPITALIST

## 2024-01-01 PROCEDURE — 93321 DOPPLER ECHO F-UP/LMTD STD: CPT | Performed by: INTERNAL MEDICINE

## 2024-01-01 PROCEDURE — C9113 INJ PANTOPRAZOLE SODIUM, VIA: HCPCS | Performed by: NURSE PRACTITIONER

## 2024-01-01 DEVICE — IMPELLA CP PUMP 371 SET, US ( IMPELLA CP WITH SMART ASSIST)
Type: IMPLANTABLE DEVICE | Site: CORONARY | Status: FUNCTIONAL
Brand: IMPELLA

## 2024-01-01 DEVICE — STENT, ONYX FRONTIER DES, 2.75 X 22RX: Type: IMPLANTABLE DEVICE | Site: CORONARY | Status: FUNCTIONAL

## 2024-01-01 RX ORDER — ONDANSETRON HYDROCHLORIDE 2 MG/ML
4 INJECTION, SOLUTION INTRAVENOUS ONCE
Status: COMPLETED | OUTPATIENT
Start: 2024-01-01 | End: 2024-01-01

## 2024-01-01 RX ORDER — NOREPINEPHRINE BITARTRATE/D5W 8 MG/250ML
.01-.5 PLASTIC BAG, INJECTION (ML) INTRAVENOUS CONTINUOUS
Status: DISCONTINUED | OUTPATIENT
Start: 2024-01-01 | End: 2024-01-01

## 2024-01-01 RX ORDER — ROCURONIUM BROMIDE 10 MG/ML
100 INJECTION, SOLUTION INTRAVENOUS ONCE
Status: COMPLETED | OUTPATIENT
Start: 2024-01-01 | End: 2024-01-01

## 2024-01-01 RX ORDER — HYDROMORPHONE HYDROCHLORIDE 0.2 MG/ML
0.2 INJECTION INTRAMUSCULAR; INTRAVENOUS; SUBCUTANEOUS
Status: DISCONTINUED | OUTPATIENT
Start: 2024-01-01 | End: 2024-01-01 | Stop reason: HOSPADM

## 2024-01-01 RX ORDER — PANTOPRAZOLE SODIUM 40 MG/1
40 TABLET, DELAYED RELEASE ORAL
Status: DISCONTINUED | OUTPATIENT
Start: 2024-01-01 | End: 2024-01-01

## 2024-01-01 RX ORDER — CALCIUM GLUCONATE 20 MG/ML
1 INJECTION, SOLUTION INTRAVENOUS EVERY 6 HOURS PRN
Status: DISCONTINUED | OUTPATIENT
Start: 2024-01-01 | End: 2024-01-01 | Stop reason: HOSPADM

## 2024-01-01 RX ORDER — ACETAMINOPHEN 325 MG/1
650 TABLET ORAL EVERY 6 HOURS
OUTPATIENT
Start: 2024-01-01

## 2024-01-01 RX ORDER — CALCIUM CHLORIDE INJECTION 100 MG/ML
INJECTION, SOLUTION INTRAVENOUS
Status: DISPENSED
Start: 2024-01-01 | End: 2024-01-01

## 2024-01-01 RX ORDER — LEVALBUTEROL 1.25 MG/.5ML
1.25 SOLUTION, CONCENTRATE RESPIRATORY (INHALATION) EVERY 4 HOURS PRN
Status: DISCONTINUED | OUTPATIENT
Start: 2024-01-01 | End: 2024-01-01 | Stop reason: HOSPADM

## 2024-01-01 RX ORDER — POLYETHYLENE GLYCOL 3350 17 G/17G
17 POWDER, FOR SOLUTION ORAL DAILY
OUTPATIENT
Start: 2024-01-01

## 2024-01-01 RX ORDER — SODIUM CHLORIDE, SODIUM LACTATE, POTASSIUM CHLORIDE, CALCIUM CHLORIDE 600; 310; 30; 20 MG/100ML; MG/100ML; MG/100ML; MG/100ML
100 INJECTION, SOLUTION INTRAVENOUS CONTINUOUS
Status: DISCONTINUED | OUTPATIENT
Start: 2024-01-01 | End: 2024-01-01 | Stop reason: HOSPADM

## 2024-01-01 RX ORDER — OXYCODONE HYDROCHLORIDE 5 MG/1
5 TABLET ORAL EVERY 4 HOURS PRN
OUTPATIENT
Start: 2024-01-01

## 2024-01-01 RX ORDER — CALCIUM GLUCONATE 20 MG/ML
2 INJECTION, SOLUTION INTRAVENOUS EVERY 6 HOURS PRN
Status: DISCONTINUED | OUTPATIENT
Start: 2024-01-01 | End: 2024-01-01 | Stop reason: HOSPADM

## 2024-01-01 RX ORDER — FENTANYL CITRATE-0.9 % NACL/PF 10 MCG/ML
25-200 PLASTIC BAG, INJECTION (ML) INTRAVENOUS CONTINUOUS
OUTPATIENT
Start: 2024-01-01

## 2024-01-01 RX ORDER — HEPARIN SODIUM 1000 [USP'U]/ML
INJECTION, SOLUTION INTRAVENOUS; SUBCUTANEOUS AS NEEDED
Status: DISCONTINUED | OUTPATIENT
Start: 2024-01-01 | End: 2024-01-01 | Stop reason: HOSPADM

## 2024-01-01 RX ORDER — PANTOPRAZOLE SODIUM 40 MG/10ML
40 INJECTION, POWDER, LYOPHILIZED, FOR SOLUTION INTRAVENOUS 2 TIMES DAILY
Status: DISCONTINUED | OUTPATIENT
Start: 2024-01-01 | End: 2024-01-01 | Stop reason: HOSPADM

## 2024-01-01 RX ORDER — POTASSIUM CHLORIDE 20 MEQ/1
20 TABLET, EXTENDED RELEASE ORAL EVERY 6 HOURS PRN
Status: DISCONTINUED | OUTPATIENT
Start: 2024-01-01 | End: 2024-01-01

## 2024-01-01 RX ORDER — SODIUM CHLORIDE, SODIUM LACTATE, POTASSIUM CHLORIDE, CALCIUM CHLORIDE 600; 310; 30; 20 MG/100ML; MG/100ML; MG/100ML; MG/100ML
30 INJECTION, SOLUTION INTRAVENOUS CONTINUOUS
Status: DISCONTINUED | OUTPATIENT
Start: 2024-01-01 | End: 2024-01-01 | Stop reason: HOSPADM

## 2024-01-01 RX ORDER — EPINEPHRINE HCL IN DEXTROSE 5% 4MG/250ML
.01-1 PLASTIC BAG, INJECTION (ML) INTRAVENOUS CONTINUOUS
Status: DISCONTINUED | OUTPATIENT
Start: 2024-01-01 | End: 2024-01-01 | Stop reason: HOSPADM

## 2024-01-01 RX ORDER — VANCOMYCIN HYDROCHLORIDE 1 G/200ML
1 INJECTION, SOLUTION INTRAVENOUS EVERY 24 HOURS
Status: DISCONTINUED | OUTPATIENT
Start: 2024-01-01 | End: 2024-01-01

## 2024-01-01 RX ORDER — HYDROMORPHONE HYDROCHLORIDE 0.2 MG/ML
0.2 INJECTION INTRAMUSCULAR; INTRAVENOUS; SUBCUTANEOUS
OUTPATIENT
Start: 2024-01-01

## 2024-01-01 RX ORDER — VANCOMYCIN HYDROCHLORIDE 1 G/20ML
INJECTION, POWDER, LYOPHILIZED, FOR SOLUTION INTRAVENOUS DAILY PRN
Status: DISCONTINUED | OUTPATIENT
Start: 2024-01-01 | End: 2024-01-01

## 2024-01-01 RX ORDER — PROPOFOL 10 MG/ML
5-50 INJECTION, EMULSION INTRAVENOUS CONTINUOUS
Status: DISCONTINUED | OUTPATIENT
Start: 2024-01-01 | End: 2024-01-01 | Stop reason: HOSPADM

## 2024-01-01 RX ORDER — MORPHINE SULFATE IN 0.9 % NACL 30 MG/30ML
0-10 PATIENT CONTROLLED ANALGESIA SYRINGE INTRAVENOUS CONTINUOUS
Status: DISCONTINUED | OUTPATIENT
Start: 2024-01-01 | End: 2024-01-01 | Stop reason: HOSPADM

## 2024-01-01 RX ORDER — EPTIFIBATIDE 0.75 MG/ML
1 INJECTION, SOLUTION INTRAVENOUS CONTINUOUS
Status: DISCONTINUED | OUTPATIENT
Start: 2024-01-01 | End: 2024-01-01 | Stop reason: HOSPADM

## 2024-01-01 RX ORDER — NITROGLYCERIN 20 MG/100ML
INJECTION INTRAVENOUS
Status: COMPLETED
Start: 2024-01-01 | End: 2024-01-01

## 2024-01-01 RX ORDER — SODIUM CHLORIDE, SODIUM LACTATE, POTASSIUM CHLORIDE, CALCIUM CHLORIDE 600; 310; 30; 20 MG/100ML; MG/100ML; MG/100ML; MG/100ML
20 INJECTION, SOLUTION INTRAVENOUS CONTINUOUS
Status: DISCONTINUED | OUTPATIENT
Start: 2024-01-01 | End: 2024-01-01 | Stop reason: HOSPADM

## 2024-01-01 RX ORDER — NAPROXEN SODIUM 220 MG/1
324 TABLET, FILM COATED ORAL DAILY
Status: DISCONTINUED | OUTPATIENT
Start: 2024-01-01 | End: 2024-01-01 | Stop reason: HOSPADM

## 2024-01-01 RX ORDER — CLOPIDOGREL BISULFATE 75 MG/1
75 TABLET ORAL DAILY
Qty: 90 TABLET | Refills: 3 | Status: SHIPPED | OUTPATIENT
Start: 2024-01-01 | End: 2024-01-01

## 2024-01-01 RX ORDER — ATORVASTATIN CALCIUM 80 MG/1
80 TABLET, FILM COATED ORAL NIGHTLY
Status: DISCONTINUED | OUTPATIENT
Start: 2024-01-01 | End: 2024-01-01 | Stop reason: HOSPADM

## 2024-01-01 RX ORDER — EPTIFIBATIDE 2 MG/ML
INJECTION, SOLUTION INTRAVENOUS CONTINUOUS PRN
Status: COMPLETED | OUTPATIENT
Start: 2024-01-01 | End: 2024-01-01

## 2024-01-01 RX ORDER — LABETALOL HYDROCHLORIDE 5 MG/ML
5 INJECTION, SOLUTION INTRAVENOUS ONCE AS NEEDED
Status: DISCONTINUED | OUTPATIENT
Start: 2024-01-01 | End: 2024-01-01 | Stop reason: HOSPADM

## 2024-01-01 RX ORDER — PHENYLEPHRINE HCL IN 0.9% NACL 1 MG/10 ML
SYRINGE (ML) INTRAVENOUS AS NEEDED
Status: DISCONTINUED | OUTPATIENT
Start: 2024-01-01 | End: 2024-01-01

## 2024-01-01 RX ORDER — NALOXONE HYDROCHLORIDE 0.4 MG/ML
0.2 INJECTION, SOLUTION INTRAMUSCULAR; INTRAVENOUS; SUBCUTANEOUS EVERY 5 MIN PRN
OUTPATIENT
Start: 2024-01-01

## 2024-01-01 RX ORDER — FENTANYL CITRATE-0.9 % NACL/PF 10 MCG/ML
25-200 PLASTIC BAG, INJECTION (ML) INTRAVENOUS CONTINUOUS
Status: DISCONTINUED | OUTPATIENT
Start: 2024-01-01 | End: 2024-01-01 | Stop reason: HOSPADM

## 2024-01-01 RX ORDER — POTASSIUM CHLORIDE 14.9 MG/ML
20 INJECTION INTRAVENOUS ONCE
Status: COMPLETED | OUTPATIENT
Start: 2024-01-01 | End: 2024-01-01

## 2024-01-01 RX ORDER — LORAZEPAM 2 MG/ML
2 INJECTION INTRAMUSCULAR EVERY 10 MIN PRN
Status: DISCONTINUED | OUTPATIENT
Start: 2024-01-01 | End: 2024-01-01 | Stop reason: HOSPADM

## 2024-01-01 RX ORDER — POTASSIUM CHLORIDE 29.8 MG/ML
40 INJECTION INTRAVENOUS EVERY 6 HOURS PRN
Status: DISCONTINUED | OUTPATIENT
Start: 2024-01-01 | End: 2024-01-01 | Stop reason: HOSPADM

## 2024-01-01 RX ORDER — MEPERIDINE HYDROCHLORIDE 50 MG/ML
12.5 INJECTION INTRAMUSCULAR; INTRAVENOUS; SUBCUTANEOUS EVERY 10 MIN PRN
Status: DISCONTINUED | OUTPATIENT
Start: 2024-01-01 | End: 2024-01-01 | Stop reason: HOSPADM

## 2024-01-01 RX ORDER — POTASSIUM CHLORIDE 1.5 G/1.58G
20 POWDER, FOR SOLUTION ORAL EVERY 6 HOURS PRN
Status: DISCONTINUED | OUTPATIENT
Start: 2024-01-01 | End: 2024-01-01

## 2024-01-01 RX ORDER — PANTOPRAZOLE SODIUM 40 MG/10ML
40 INJECTION, POWDER, LYOPHILIZED, FOR SOLUTION INTRAVENOUS
Status: DISCONTINUED | OUTPATIENT
Start: 2024-01-01 | End: 2024-01-01 | Stop reason: HOSPADM

## 2024-01-01 RX ORDER — MORPHINE SULFATE 2 MG/ML
2 INJECTION, SOLUTION INTRAMUSCULAR; INTRAVENOUS
Status: DISCONTINUED | OUTPATIENT
Start: 2024-01-01 | End: 2024-01-01

## 2024-01-01 RX ORDER — LIDOCAINE HYDROCHLORIDE 10 MG/ML
0.1 INJECTION INFILTRATION; PERINEURAL ONCE
Status: DISCONTINUED | OUTPATIENT
Start: 2024-01-01 | End: 2024-01-01 | Stop reason: HOSPADM

## 2024-01-01 RX ORDER — HEPARIN SODIUM 10000 [USP'U]/100ML
0-2000 INJECTION, SOLUTION INTRAVENOUS CONTINUOUS
Status: CANCELLED | OUTPATIENT
Start: 2024-01-01

## 2024-01-01 RX ORDER — MIDAZOLAM HYDROCHLORIDE 1 MG/ML
2 INJECTION INTRAMUSCULAR; INTRAVENOUS ONCE
Status: COMPLETED | OUTPATIENT
Start: 2024-01-01 | End: 2024-01-01

## 2024-01-01 RX ORDER — ALBUTEROL SULFATE 0.83 MG/ML
2.5 SOLUTION RESPIRATORY (INHALATION) ONCE AS NEEDED
Status: DISCONTINUED | OUTPATIENT
Start: 2024-01-01 | End: 2024-01-01 | Stop reason: HOSPADM

## 2024-01-01 RX ORDER — NOREPINEPHRINE BITARTRATE/D5W 8 MG/250ML
PLASTIC BAG, INJECTION (ML) INTRAVENOUS
Status: COMPLETED
Start: 2024-01-01 | End: 2024-01-01

## 2024-01-01 RX ORDER — VANCOMYCIN HYDROCHLORIDE 1 G/200ML
1000 INJECTION, SOLUTION INTRAVENOUS EVERY 24 HOURS
Status: DISCONTINUED | OUTPATIENT
Start: 2024-01-01 | End: 2024-01-01 | Stop reason: HOSPADM

## 2024-01-01 RX ORDER — NAPROXEN SODIUM 220 MG/1
324 TABLET, FILM COATED ORAL DAILY
OUTPATIENT
Start: 2024-01-01

## 2024-01-01 RX ORDER — MORPHINE SULFATE 4 MG/ML
2 INJECTION INTRAVENOUS
Status: DISCONTINUED | OUTPATIENT
Start: 2024-01-01 | End: 2024-01-01 | Stop reason: HOSPADM

## 2024-01-01 RX ORDER — FENTANYL CITRATE-0.9 % NACL/PF 10 MCG/ML
0-300 PLASTIC BAG, INJECTION (ML) INTRAVENOUS CONTINUOUS
Status: DISCONTINUED | OUTPATIENT
Start: 2024-01-01 | End: 2024-01-01 | Stop reason: HOSPADM

## 2024-01-01 RX ORDER — EPTIFIBATIDE 0.75 MG/ML
1 INJECTION, SOLUTION INTRAVENOUS CONTINUOUS
OUTPATIENT
Start: 2024-01-01 | End: 2024-01-01

## 2024-01-01 RX ORDER — MIDAZOLAM HYDROCHLORIDE 1 MG/ML
INJECTION, SOLUTION INTRAVENOUS
Status: COMPLETED
Start: 2024-01-01 | End: 2024-01-01

## 2024-01-01 RX ORDER — LORAZEPAM 2 MG/ML
0.5 INJECTION INTRAMUSCULAR EVERY 4 HOURS PRN
Status: DISCONTINUED | OUTPATIENT
Start: 2024-01-01 | End: 2024-01-01 | Stop reason: HOSPADM

## 2024-01-01 RX ORDER — PANTOPRAZOLE SODIUM 40 MG/10ML
40 INJECTION, POWDER, LYOPHILIZED, FOR SOLUTION INTRAVENOUS
Status: DISCONTINUED | OUTPATIENT
Start: 2024-01-01 | End: 2024-01-01

## 2024-01-01 RX ORDER — ALBUMIN HUMAN 50 G/1000ML
SOLUTION INTRAVENOUS
Status: DISPENSED
Start: 2024-01-01 | End: 2024-01-01

## 2024-01-01 RX ORDER — DEXTROMETHORPHAN/PSEUDOEPHED 2.5-7.5/.8
DROPS ORAL AS NEEDED
Status: COMPLETED | OUTPATIENT
Start: 2024-01-01 | End: 2024-01-01

## 2024-01-01 RX ORDER — DEXTROSE 50 % IN WATER (D50W) INTRAVENOUS SYRINGE
25
Status: DISCONTINUED | OUTPATIENT
Start: 2024-01-01 | End: 2024-01-01 | Stop reason: HOSPADM

## 2024-01-01 RX ORDER — HEPARIN SODIUM 1000 [USP'U]/ML
INJECTION, SOLUTION INTRAVENOUS; SUBCUTANEOUS
Status: DISPENSED
Start: 2024-01-01 | End: 2024-01-01

## 2024-01-01 RX ORDER — PANTOPRAZOLE SODIUM 40 MG/1
40 TABLET, DELAYED RELEASE ORAL
Status: CANCELLED | OUTPATIENT
Start: 2024-01-01

## 2024-01-01 RX ORDER — CEFAZOLIN SODIUM 2 G/100ML
2 INJECTION, SOLUTION INTRAVENOUS EVERY 8 HOURS
OUTPATIENT
Start: 2024-01-01 | End: 2024-06-11

## 2024-01-01 RX ORDER — MORPHINE SULFATE 2 MG/ML
2 INJECTION, SOLUTION INTRAMUSCULAR; INTRAVENOUS ONCE
Status: COMPLETED | OUTPATIENT
Start: 2024-01-01 | End: 2024-01-01

## 2024-01-01 RX ORDER — NOREPINEPHRINE BITARTRATE/D5W 8 MG/250ML
PLASTIC BAG, INJECTION (ML) INTRAVENOUS
Status: DISCONTINUED
Start: 2024-01-01 | End: 2024-01-01 | Stop reason: HOSPADM

## 2024-01-01 RX ORDER — POLYETHYLENE GLYCOL 3350 17 G/17G
17 POWDER, FOR SOLUTION ORAL DAILY
Status: DISCONTINUED | OUTPATIENT
Start: 2024-01-01 | End: 2024-01-01 | Stop reason: HOSPADM

## 2024-01-01 RX ORDER — FENTANYL CITRATE 50 UG/ML
25 INJECTION, SOLUTION INTRAMUSCULAR; INTRAVENOUS EVERY 5 MIN PRN
Status: DISCONTINUED | OUTPATIENT
Start: 2024-01-01 | End: 2024-01-01 | Stop reason: HOSPADM

## 2024-01-01 RX ORDER — FENTANYL CITRATE 50 UG/ML
50 INJECTION, SOLUTION INTRAMUSCULAR; INTRAVENOUS
OUTPATIENT
Start: 2024-01-01

## 2024-01-01 RX ORDER — MORPHINE SULFATE 4 MG/ML
4 INJECTION INTRAVENOUS
Status: DISCONTINUED | OUTPATIENT
Start: 2024-01-01 | End: 2024-01-01 | Stop reason: HOSPADM

## 2024-01-01 RX ORDER — FUROSEMIDE 10 MG/ML
60 INJECTION INTRAMUSCULAR; INTRAVENOUS ONCE
Status: COMPLETED | OUTPATIENT
Start: 2024-01-01 | End: 2024-01-01

## 2024-01-01 RX ORDER — VANCOMYCIN HYDROCHLORIDE 1 G/20ML
INJECTION, POWDER, LYOPHILIZED, FOR SOLUTION INTRAVENOUS DAILY PRN
Status: DISCONTINUED | OUTPATIENT
Start: 2024-01-01 | End: 2024-01-01 | Stop reason: HOSPADM

## 2024-01-01 RX ORDER — PROPOFOL 10 MG/ML
INJECTION, EMULSION INTRAVENOUS
Status: DISCONTINUED
Start: 2024-01-01 | End: 2024-01-01 | Stop reason: HOSPADM

## 2024-01-01 RX ORDER — ROCURONIUM BROMIDE 10 MG/ML
INJECTION, SOLUTION INTRAVENOUS
Status: COMPLETED
Start: 2024-01-01 | End: 2024-01-01

## 2024-01-01 RX ORDER — ATORVASTATIN CALCIUM 40 MG/1
40 TABLET, FILM COATED ORAL DAILY
Qty: 90 TABLET | Refills: 3 | Status: SHIPPED | OUTPATIENT
Start: 2024-01-01 | End: 2024-01-01

## 2024-01-01 RX ORDER — HEPARIN SODIUM 10000 [USP'U]/100ML
0-2000 INJECTION, SOLUTION INTRAVENOUS CONTINUOUS
OUTPATIENT
Start: 2024-01-01

## 2024-01-01 RX ORDER — ASPIRIN 300 MG/1
150 SUPPOSITORY RECTAL DAILY
Status: DISCONTINUED | OUTPATIENT
Start: 2024-01-01 | End: 2024-01-01 | Stop reason: HOSPADM

## 2024-01-01 RX ORDER — PANTOPRAZOLE SODIUM 40 MG/1
40 TABLET, DELAYED RELEASE ORAL 2 TIMES DAILY
Status: DISCONTINUED | OUTPATIENT
Start: 2024-01-01 | End: 2024-01-01 | Stop reason: HOSPADM

## 2024-01-01 RX ORDER — NITROGLYCERIN 20 MG/100ML
5-200 INJECTION INTRAVENOUS CONTINUOUS
Status: DISCONTINUED | OUTPATIENT
Start: 2024-01-01 | End: 2024-01-01 | Stop reason: HOSPADM

## 2024-01-01 RX ORDER — PANTOPRAZOLE SODIUM 40 MG/10ML
40 INJECTION, POWDER, LYOPHILIZED, FOR SOLUTION INTRAVENOUS
OUTPATIENT
Start: 2024-01-01

## 2024-01-01 RX ORDER — SODIUM CHLORIDE, SODIUM LACTATE, POTASSIUM CHLORIDE, CALCIUM CHLORIDE 600; 310; 30; 20 MG/100ML; MG/100ML; MG/100ML; MG/100ML
5 INJECTION, SOLUTION INTRAVENOUS CONTINUOUS
Status: DISCONTINUED | OUTPATIENT
Start: 2024-01-01 | End: 2024-01-01 | Stop reason: HOSPADM

## 2024-01-01 RX ORDER — EPTIFIBATIDE 0.75 MG/ML
2 INJECTION, SOLUTION INTRAVENOUS CONTINUOUS
Status: DISPENSED | OUTPATIENT
Start: 2024-01-01 | End: 2024-01-01

## 2024-01-01 RX ORDER — EPTIFIBATIDE 0.75 MG/ML
INJECTION, SOLUTION INTRAVENOUS CONTINUOUS PRN
Status: COMPLETED | OUTPATIENT
Start: 2024-01-01 | End: 2024-01-01

## 2024-01-01 RX ORDER — LIDOCAINE HYDROCHLORIDE 20 MG/ML
INJECTION, SOLUTION INFILTRATION; PERINEURAL AS NEEDED
Status: DISCONTINUED | OUTPATIENT
Start: 2024-01-01 | End: 2024-01-01 | Stop reason: HOSPADM

## 2024-01-01 RX ORDER — FENTANYL CITRATE 50 UG/ML
50 INJECTION, SOLUTION INTRAMUSCULAR; INTRAVENOUS
Status: DISCONTINUED | OUTPATIENT
Start: 2024-01-01 | End: 2024-01-01 | Stop reason: HOSPADM

## 2024-01-01 RX ORDER — METOPROLOL TARTRATE 25 MG/1
25 TABLET, FILM COATED ORAL 2 TIMES DAILY
Qty: 180 TABLET | Refills: 3 | Status: SHIPPED | OUTPATIENT
Start: 2024-01-01 | End: 2024-01-01

## 2024-01-01 RX ORDER — SODIUM CHLORIDE, SODIUM LACTATE, POTASSIUM CHLORIDE, CALCIUM CHLORIDE 600; 310; 30; 20 MG/100ML; MG/100ML; MG/100ML; MG/100ML
5 INJECTION, SOLUTION INTRAVENOUS CONTINUOUS
OUTPATIENT
Start: 2024-01-01

## 2024-01-01 RX ORDER — OXYCODONE HYDROCHLORIDE 5 MG/1
10 TABLET ORAL EVERY 4 HOURS PRN
OUTPATIENT
Start: 2024-01-01

## 2024-01-01 RX ORDER — POLYETHYLENE GLYCOL 3350, SODIUM CHLORIDE, SODIUM BICARBONATE, POTASSIUM CHLORIDE 420; 11.2; 5.72; 1.48 G/4L; G/4L; G/4L; G/4L
4000 POWDER, FOR SOLUTION ORAL ONCE
Qty: 4000 ML | Refills: 0 | Status: SHIPPED | OUTPATIENT
Start: 2024-01-01 | End: 2024-01-01

## 2024-01-01 RX ORDER — HEPARIN SODIUM 10000 [USP'U]/100ML
0-2000 INJECTION, SOLUTION INTRAVENOUS CONTINUOUS
Status: DISCONTINUED | OUTPATIENT
Start: 2024-01-01 | End: 2024-01-01 | Stop reason: HOSPADM

## 2024-01-01 RX ORDER — MAGNESIUM SULFATE HEPTAHYDRATE 40 MG/ML
2 INJECTION, SOLUTION INTRAVENOUS EVERY 6 HOURS PRN
Status: DISCONTINUED | OUTPATIENT
Start: 2024-01-01 | End: 2024-01-01 | Stop reason: HOSPADM

## 2024-01-01 RX ORDER — NITROGLYCERIN 20 MG/100ML
INJECTION INTRAVENOUS
Status: DISPENSED
Start: 2024-01-01 | End: 2024-01-01

## 2024-01-01 RX ORDER — EPINEPHRINE HCL IN DEXTROSE 5% 4MG/250ML
.01-1 PLASTIC BAG, INJECTION (ML) INTRAVENOUS CONTINUOUS
Status: DISCONTINUED | OUTPATIENT
Start: 2024-01-01 | End: 2024-01-01

## 2024-01-01 RX ORDER — POTASSIUM CHLORIDE 14.9 MG/ML
20 INJECTION INTRAVENOUS EVERY 6 HOURS PRN
Status: DISCONTINUED | OUTPATIENT
Start: 2024-01-01 | End: 2024-01-01 | Stop reason: HOSPADM

## 2024-01-01 RX ORDER — PROPOFOL 10 MG/ML
5-50 INJECTION, EMULSION INTRAVENOUS CONTINUOUS
OUTPATIENT
Start: 2024-01-01

## 2024-01-01 RX ORDER — GLYCOPYRROLATE 0.2 MG/ML
0.2 INJECTION INTRAMUSCULAR; INTRAVENOUS EVERY 4 HOURS PRN
Status: DISCONTINUED | OUTPATIENT
Start: 2024-01-01 | End: 2024-01-01 | Stop reason: HOSPADM

## 2024-01-01 RX ORDER — MAGNESIUM SULFATE 1 G/100ML
1 INJECTION INTRAVENOUS EVERY 6 HOURS PRN
Status: DISCONTINUED | OUTPATIENT
Start: 2024-01-01 | End: 2024-01-01 | Stop reason: HOSPADM

## 2024-01-01 RX ORDER — FENTANYL CITRATE 50 UG/ML
INJECTION, SOLUTION INTRAMUSCULAR; INTRAVENOUS AS NEEDED
Status: DISCONTINUED | OUTPATIENT
Start: 2024-01-01 | End: 2024-01-01 | Stop reason: HOSPADM

## 2024-01-01 RX ORDER — PANTOPRAZOLE SODIUM 40 MG/10ML
40 INJECTION, POWDER, LYOPHILIZED, FOR SOLUTION INTRAVENOUS
Status: CANCELLED | OUTPATIENT
Start: 2024-01-01

## 2024-01-01 RX ORDER — ONDANSETRON HYDROCHLORIDE 2 MG/ML
4 INJECTION, SOLUTION INTRAVENOUS ONCE AS NEEDED
Status: DISCONTINUED | OUTPATIENT
Start: 2024-01-01 | End: 2024-01-01 | Stop reason: HOSPADM

## 2024-01-01 RX ORDER — FENTANYL CITRATE 50 UG/ML
INJECTION, SOLUTION INTRAMUSCULAR; INTRAVENOUS AS NEEDED
Status: DISCONTINUED | OUTPATIENT
Start: 2024-01-01 | End: 2024-01-01

## 2024-01-01 RX ORDER — OXYCODONE HYDROCHLORIDE 5 MG/1
5 TABLET ORAL EVERY 4 HOURS PRN
Status: DISCONTINUED | OUTPATIENT
Start: 2024-01-01 | End: 2024-01-01 | Stop reason: HOSPADM

## 2024-01-01 RX ORDER — INSULIN LISPRO 100 [IU]/ML
0-15 INJECTION, SOLUTION INTRAVENOUS; SUBCUTANEOUS EVERY 4 HOURS
Status: DISCONTINUED | OUTPATIENT
Start: 2024-01-01 | End: 2024-01-01 | Stop reason: HOSPADM

## 2024-01-01 RX ORDER — HEPARIN SODIUM 10000 [USP'U]/100ML
0-4000 INJECTION, SOLUTION INTRAVENOUS CONTINUOUS
Status: DISCONTINUED | OUTPATIENT
Start: 2024-01-01 | End: 2024-01-01

## 2024-01-01 RX ORDER — EPINEPHRINE 1 MG/ML
INJECTION, SOLUTION, CONCENTRATE INTRAVENOUS
Status: DISPENSED
Start: 2024-01-01 | End: 2024-01-01

## 2024-01-01 RX ORDER — NAPROXEN SODIUM 220 MG/1
81 TABLET, FILM COATED ORAL DAILY
Status: DISCONTINUED | OUTPATIENT
Start: 2024-01-01 | End: 2024-01-01 | Stop reason: HOSPADM

## 2024-01-01 RX ORDER — PROPOFOL 10 MG/ML
INJECTION, EMULSION INTRAVENOUS AS NEEDED
Status: DISCONTINUED | OUTPATIENT
Start: 2024-01-01 | End: 2024-01-01

## 2024-01-01 RX ORDER — LEVALBUTEROL 1.25 MG/.5ML
1.25 SOLUTION, CONCENTRATE RESPIRATORY (INHALATION)
Status: DISCONTINUED | OUTPATIENT
Start: 2024-01-01 | End: 2024-01-01

## 2024-01-01 RX ORDER — FUROSEMIDE 10 MG/ML
40 INJECTION INTRAMUSCULAR; INTRAVENOUS ONCE
Status: DISCONTINUED | OUTPATIENT
Start: 2024-01-01 | End: 2024-01-01

## 2024-01-01 RX ORDER — ALBUMIN HUMAN 250 G/1000ML
SOLUTION INTRAVENOUS
Status: DISPENSED
Start: 2024-01-01 | End: 2024-01-01

## 2024-01-01 RX ORDER — PROPOFOL 10 MG/ML
INJECTION, EMULSION INTRAVENOUS CONTINUOUS PRN
Status: COMPLETED | OUTPATIENT
Start: 2024-01-01 | End: 2024-01-01

## 2024-01-01 RX ORDER — PANTOPRAZOLE SODIUM 40 MG/1
40 TABLET, DELAYED RELEASE ORAL
OUTPATIENT
Start: 2024-01-01

## 2024-01-01 RX ORDER — MORPHINE SULFATE 2 MG/ML
INJECTION, SOLUTION INTRAMUSCULAR; INTRAVENOUS
Status: COMPLETED
Start: 2024-01-01 | End: 2024-01-01

## 2024-01-01 RX ORDER — HEPARIN SODIUM 10000 [USP'U]/100ML
0-4500 INJECTION, SOLUTION INTRAVENOUS CONTINUOUS
Status: DISCONTINUED | OUTPATIENT
Start: 2024-01-01 | End: 2024-01-01

## 2024-01-01 RX ORDER — NOREPINEPHRINE BITARTRATE/D5W 8 MG/250ML
.01-.5 PLASTIC BAG, INJECTION (ML) INTRAVENOUS CONTINUOUS
Status: DISCONTINUED | OUTPATIENT
Start: 2024-01-01 | End: 2024-01-01 | Stop reason: HOSPADM

## 2024-01-01 RX ORDER — MIDAZOLAM HYDROCHLORIDE 1 MG/ML
2 INJECTION, SOLUTION INTRAMUSCULAR; INTRAVENOUS ONCE
Status: COMPLETED | OUTPATIENT
Start: 2024-01-01 | End: 2024-01-01

## 2024-01-01 RX ORDER — MIDAZOLAM HYDROCHLORIDE 1 MG/ML
INJECTION INTRAMUSCULAR; INTRAVENOUS AS NEEDED
Status: DISCONTINUED | OUTPATIENT
Start: 2024-01-01 | End: 2024-01-01 | Stop reason: HOSPADM

## 2024-01-01 RX ORDER — MAGNESIUM SULFATE HEPTAHYDRATE 40 MG/ML
INJECTION, SOLUTION INTRAVENOUS
Status: DISPENSED
Start: 2024-01-01 | End: 2024-01-01

## 2024-01-01 RX ORDER — NALOXONE HYDROCHLORIDE 0.4 MG/ML
0.2 INJECTION, SOLUTION INTRAMUSCULAR; INTRAVENOUS; SUBCUTANEOUS EVERY 5 MIN PRN
Status: DISCONTINUED | OUTPATIENT
Start: 2024-01-01 | End: 2024-01-01 | Stop reason: HOSPADM

## 2024-01-01 RX ADMIN — SODIUM CHLORIDE, SODIUM LACTATE, POTASSIUM CHLORIDE, AND CALCIUM CHLORIDE: 600; 310; 30; 20 INJECTION, SOLUTION INTRAVENOUS at 07:42

## 2024-01-01 RX ADMIN — SODIUM CHLORIDE, POTASSIUM CHLORIDE, SODIUM LACTATE AND CALCIUM CHLORIDE 1000 ML: 600; 310; 30; 20 INJECTION, SOLUTION INTRAVENOUS at 04:17

## 2024-01-01 RX ADMIN — AMIODARONE HYDROCHLORIDE 1 MG/MIN: 1.8 INJECTION, SOLUTION INTRAVENOUS at 06:02

## 2024-01-01 RX ADMIN — NOREPINEPHRINE BITARTRATE 0.04 MCG/KG/MIN: 8 INJECTION, SOLUTION INTRAVENOUS at 15:42

## 2024-01-01 RX ADMIN — PROPOFOL 50 MCG/KG/MIN: 10 INJECTION, EMULSION INTRAVENOUS at 09:22

## 2024-01-01 RX ADMIN — SODIUM CHLORIDE, POTASSIUM CHLORIDE, SODIUM LACTATE AND CALCIUM CHLORIDE 5 ML/HR: 600; 310; 30; 20 INJECTION, SOLUTION INTRAVENOUS at 09:19

## 2024-01-01 RX ADMIN — NITROGLYCERIN 160 MCG/MIN: 20 INJECTION INTRAVENOUS at 08:05

## 2024-01-01 RX ADMIN — ROCURONIUM BROMIDE 100 MG: 50 INJECTION, SOLUTION INTRAVENOUS at 08:13

## 2024-01-01 RX ADMIN — ONDANSETRON 4 MG: 2 INJECTION INTRAMUSCULAR; INTRAVENOUS at 04:09

## 2024-01-01 RX ADMIN — FUROSEMIDE 60 MG: 10 INJECTION, SOLUTION INTRAMUSCULAR; INTRAVENOUS at 19:58

## 2024-01-01 RX ADMIN — SODIUM BICARBONATE 10 ML/HR: 84 INJECTION, SOLUTION INTRAVENOUS at 12:33

## 2024-01-01 RX ADMIN — CARBOXYMETHYLCELLULOSE SODIUM 2 DROP: 5 SOLUTION/ DROPS OPHTHALMIC at 12:03

## 2024-01-01 RX ADMIN — EPTIFIBATIDE 2 MCG/KG/MIN: 0.75 INJECTION INTRAVENOUS at 20:15

## 2024-01-01 RX ADMIN — AMIODARONE HYDROCHLORIDE 1 MG/MIN: 1.8 INJECTION, SOLUTION INTRAVENOUS at 23:11

## 2024-01-01 RX ADMIN — Medication 50 MCG/HR: at 13:50

## 2024-01-01 RX ADMIN — Medication 40 PERCENT: at 20:00

## 2024-01-01 RX ADMIN — PANTOPRAZOLE SODIUM 40 MG: 40 INJECTION, POWDER, FOR SOLUTION INTRAVENOUS at 21:12

## 2024-01-01 RX ADMIN — PANTOPRAZOLE SODIUM 40 MG: 40 INJECTION, POWDER, FOR SOLUTION INTRAVENOUS at 10:22

## 2024-01-01 RX ADMIN — IOHEXOL 100 ML: 350 INJECTION, SOLUTION INTRAVENOUS at 03:24

## 2024-01-01 RX ADMIN — VANCOMYCIN HYDROCHLORIDE 1000 MG: 1 INJECTION, SOLUTION INTRAVENOUS at 10:36

## 2024-01-01 RX ADMIN — AMIODARONE HYDROCHLORIDE 1 MG/MIN: 1.8 INJECTION, SOLUTION INTRAVENOUS at 14:52

## 2024-01-01 RX ADMIN — POTASSIUM CHLORIDE 20 MEQ: 14.9 INJECTION, SOLUTION INTRAVENOUS at 10:53

## 2024-01-01 RX ADMIN — Medication 165 MG: at 06:00

## 2024-01-01 RX ADMIN — PROPOFOL 500 MG: 10 INJECTION, EMULSION INTRAVENOUS at 07:52

## 2024-01-01 RX ADMIN — PROPOFOL 50 MCG/KG/MIN: 10 INJECTION, EMULSION INTRAVENOUS at 13:51

## 2024-01-01 RX ADMIN — MIDAZOLAM 2 MG: 1 INJECTION INTRAMUSCULAR; INTRAVENOUS at 02:52

## 2024-01-01 RX ADMIN — PROPOFOL 30 MCG/KG/MIN: 10 INJECTION, EMULSION INTRAVENOUS at 00:48

## 2024-01-01 RX ADMIN — CISATRACURIUM BESYLATE 3 MCG/KG/MIN: 200 INJECTION, SOLUTION INTRAVENOUS at 04:15

## 2024-01-01 RX ADMIN — HEPARIN SODIUM 1000 UNITS/HR: 10000 INJECTION, SOLUTION INTRAVENOUS at 15:45

## 2024-01-01 RX ADMIN — Medication 0.04 MCG/KG/MIN: at 15:42

## 2024-01-01 RX ADMIN — SIMETHICONE 300 MG: 20 EMULSION ORAL at 07:58

## 2024-01-01 RX ADMIN — FENTANYL CITRATE 25 MCG: 50 INJECTION, SOLUTION INTRAMUSCULAR; INTRAVENOUS at 07:57

## 2024-01-01 RX ADMIN — MORPHINE SULFATE 2 MG: 2 INJECTION, SOLUTION INTRAMUSCULAR; INTRAVENOUS at 06:02

## 2024-01-01 RX ADMIN — SODIUM CHLORIDE 1000 ML: 9 INJECTION, SOLUTION INTRAVENOUS at 03:38

## 2024-01-01 RX ADMIN — PIPERACILLIN SODIUM AND TAZOBACTAM SODIUM 4.5 G: 4; .5 INJECTION, SOLUTION INTRAVENOUS at 21:46

## 2024-01-01 RX ADMIN — Medication 40 PERCENT: at 08:00

## 2024-01-01 RX ADMIN — Medication 80 PERCENT: at 12:00

## 2024-01-01 RX ADMIN — AMIODARONE HYDROCHLORIDE 1 MG/MIN: 1.8 INJECTION, SOLUTION INTRAVENOUS at 21:12

## 2024-01-01 RX ADMIN — FENTANYL CITRATE 25 MCG: 50 INJECTION, SOLUTION INTRAMUSCULAR; INTRAVENOUS at 08:09

## 2024-01-01 RX ADMIN — ATORVASTATIN CALCIUM 80 MG: 80 TABLET, FILM COATED ORAL at 21:51

## 2024-01-01 RX ADMIN — Medication 60 PERCENT: at 13:42

## 2024-01-01 RX ADMIN — FENTANYL CITRATE 25 MCG: 50 INJECTION, SOLUTION INTRAMUSCULAR; INTRAVENOUS at 08:25

## 2024-01-01 RX ADMIN — PIPERACILLIN AND TAZOBACTAM 4.5 G: 4; .5 INJECTION, POWDER, FOR SOLUTION INTRAVENOUS at 06:14

## 2024-01-01 RX ADMIN — Medication 50 MCG/HR: at 15:41

## 2024-01-01 RX ADMIN — GLYCOPYRROLATE 0.2 MG: 0.2 INJECTION, SOLUTION INTRAMUSCULAR; INTRAVENOUS at 01:45

## 2024-01-01 RX ADMIN — TICAGRELOR 90 MG: 90 TABLET ORAL at 21:52

## 2024-01-01 RX ADMIN — AMIODARONE HYDROCHLORIDE 1 MG/MIN: 1.8 INJECTION, SOLUTION INTRAVENOUS at 08:43

## 2024-01-01 RX ADMIN — CARBOXYMETHYLCELLULOSE SODIUM 2 DROP: 5 SOLUTION/ DROPS OPHTHALMIC at 18:24

## 2024-01-01 RX ADMIN — MIDAZOLAM HYDROCHLORIDE 2 MG: 1 INJECTION, SOLUTION INTRAMUSCULAR; INTRAVENOUS at 20:56

## 2024-01-01 RX ADMIN — ROCURONIUM BROMIDE 100 MG: 10 INJECTION, SOLUTION INTRAVENOUS at 08:13

## 2024-01-01 RX ADMIN — ASPIRIN 324 MG: 81 TABLET, CHEWABLE ORAL at 08:55

## 2024-01-01 RX ADMIN — LEVALBUTEROL 1.25 MG: 1.25 SOLUTION, CONCENTRATE RESPIRATORY (INHALATION) at 05:37

## 2024-01-01 RX ADMIN — PROPOFOL 5 MCG/KG/MIN: 10 INJECTION, EMULSION INTRAVENOUS at 08:44

## 2024-01-01 RX ADMIN — MORPHINE SULFATE 2 MG/HR: 10 INJECTION INTRAVENOUS at 01:45

## 2024-01-01 RX ADMIN — AMIODARONE HYDROCHLORIDE 1 MG/MIN: 1.8 INJECTION, SOLUTION INTRAVENOUS at 22:21

## 2024-01-01 RX ADMIN — SODIUM CHLORIDE, POTASSIUM CHLORIDE, SODIUM LACTATE AND CALCIUM CHLORIDE 30 ML/HR: 600; 310; 30; 20 INJECTION, SOLUTION INTRAVENOUS at 09:20

## 2024-01-01 RX ADMIN — Medication 40 PERCENT: at 00:14

## 2024-01-01 RX ADMIN — Medication 0.3 MCG/KG/MIN: at 22:48

## 2024-01-01 RX ADMIN — PIPERACILLIN SODIUM AND TAZOBACTAM SODIUM 4.5 G: 4; .5 INJECTION, SOLUTION INTRAVENOUS at 05:10

## 2024-01-01 RX ADMIN — LORAZEPAM 2 MG: 2 INJECTION INTRAMUSCULAR; INTRAVENOUS at 01:44

## 2024-01-01 RX ADMIN — EPTIFIBATIDE 2 MCG/KG/MIN: 0.75 INJECTION INTRAVENOUS at 15:43

## 2024-01-01 RX ADMIN — Medication 100 MCG: at 08:30

## 2024-01-01 RX ADMIN — VANCOMYCIN HYDROCHLORIDE 2 G: 10 INJECTION, POWDER, LYOPHILIZED, FOR SOLUTION INTRAVENOUS at 05:06

## 2024-01-01 RX ADMIN — AMIODARONE HYDROCHLORIDE 1 MG/MIN: 1.8 INJECTION, SOLUTION INTRAVENOUS at 06:04

## 2024-01-01 RX ADMIN — Medication 100 MG: at 08:15

## 2024-01-01 RX ADMIN — AMIODARONE HYDROCHLORIDE 1 MG/MIN: 1.8 INJECTION, SOLUTION INTRAVENOUS at 03:42

## 2024-01-01 RX ADMIN — SODIUM CHLORIDE, POTASSIUM CHLORIDE, SODIUM LACTATE AND CALCIUM CHLORIDE 1000 ML: 600; 310; 30; 20 INJECTION, SOLUTION INTRAVENOUS at 06:01

## 2024-01-01 RX ADMIN — MIDAZOLAM IN SODIUM CHLORIDE 100 MG: 1 INJECTION INTRAVENOUS at 20:50

## 2024-01-01 RX ADMIN — TICAGRELOR 180 MG: 90 TABLET ORAL at 08:45

## 2024-01-01 RX ADMIN — FENTANYL CITRATE 25 MCG: 50 INJECTION, SOLUTION INTRAMUSCULAR; INTRAVENOUS at 08:39

## 2024-01-01 RX ADMIN — INSULIN LISPRO 5 UNITS: 100 INJECTION, SOLUTION INTRAVENOUS; SUBCUTANEOUS at 08:59

## 2024-01-01 RX ADMIN — INSULIN LISPRO 5 UNITS: 100 INJECTION, SOLUTION INTRAVENOUS; SUBCUTANEOUS at 12:03

## 2024-01-01 RX ADMIN — HEPARIN SODIUM 1500 UNITS/HR: 10000 INJECTION, SOLUTION INTRAVENOUS at 04:22

## 2024-01-01 RX ADMIN — Medication 40 PERCENT: at 08:50

## 2024-01-01 RX ADMIN — Medication 75 MCG/HR: at 22:23

## 2024-01-01 RX ADMIN — PIPERACILLIN SODIUM AND TAZOBACTAM SODIUM 4.5 G: 4; .5 INJECTION, SOLUTION INTRAVENOUS at 16:49

## 2024-01-01 RX ADMIN — PROPOFOL 200 MG: 10 INJECTION, EMULSION INTRAVENOUS at 08:24

## 2024-01-01 RX ADMIN — HYDROCORTISONE SODIUM SUCCINATE 100 MG: 100 INJECTION, POWDER, FOR SOLUTION INTRAMUSCULAR; INTRAVENOUS at 06:06

## 2024-01-01 RX ADMIN — PROPOFOL 50 MCG/KG/MIN: 10 INJECTION, EMULSION INTRAVENOUS at 06:04

## 2024-01-01 SDOH — HEALTH STABILITY: MENTAL HEALTH: CURRENT SMOKER: 0

## 2024-01-01 SDOH — SOCIAL STABILITY: SOCIAL INSECURITY: HAVE YOU HAD THOUGHTS OF HARMING ANYONE ELSE?: NO

## 2024-01-01 SDOH — SOCIAL STABILITY: SOCIAL INSECURITY: DO YOU FEEL UNSAFE GOING BACK TO THE PLACE WHERE YOU ARE LIVING?: UNABLE TO ASSESS

## 2024-01-01 SDOH — SOCIAL STABILITY: SOCIAL INSECURITY: DOES ANYONE TRY TO KEEP YOU FROM HAVING/CONTACTING OTHER FRIENDS OR DOING THINGS OUTSIDE YOUR HOME?: NO

## 2024-01-01 SDOH — SOCIAL STABILITY: SOCIAL INSECURITY: WERE YOU ABLE TO COMPLETE ALL THE BEHAVIORAL HEALTH SCREENINGS?: NO

## 2024-01-01 SDOH — SOCIAL STABILITY: SOCIAL INSECURITY: HAS ANYONE EVER THREATENED TO HURT YOUR FAMILY OR YOUR PETS?: NO

## 2024-01-01 SDOH — SOCIAL STABILITY: SOCIAL INSECURITY: ARE THERE ANY APPARENT SIGNS OF INJURIES/BEHAVIORS THAT COULD BE RELATED TO ABUSE/NEGLECT?: UNABLE TO ASSESS

## 2024-01-01 SDOH — SOCIAL STABILITY: SOCIAL INSECURITY: ABUSE: ADULT

## 2024-01-01 SDOH — SOCIAL STABILITY: SOCIAL INSECURITY: ARE YOU OR HAVE YOU BEEN THREATENED OR ABUSED PHYSICALLY, EMOTIONALLY, OR SEXUALLY BY ANYONE?: UNABLE TO ASSESS

## 2024-01-01 SDOH — SOCIAL STABILITY: SOCIAL INSECURITY: DO YOU FEEL ANYONE HAS EXPLOITED OR TAKEN ADVANTAGE OF YOU FINANCIALLY OR OF YOUR PERSONAL PROPERTY?: NO

## 2024-01-01 SDOH — SOCIAL STABILITY: SOCIAL INSECURITY: ARE YOU OR HAVE YOU BEEN THREATENED OR ABUSED PHYSICALLY, EMOTIONALLY, OR SEXUALLY BY ANYONE?: NO

## 2024-01-01 SDOH — SOCIAL STABILITY: SOCIAL INSECURITY: ARE THERE ANY APPARENT SIGNS OF INJURIES/BEHAVIORS THAT COULD BE RELATED TO ABUSE/NEGLECT?: NO

## 2024-01-01 SDOH — SOCIAL STABILITY: SOCIAL INSECURITY: HAVE YOU HAD THOUGHTS OF HARMING ANYONE ELSE?: UNABLE TO ASSESS

## 2024-01-01 SDOH — SOCIAL STABILITY: SOCIAL INSECURITY: DO YOU FEEL ANYONE HAS EXPLOITED OR TAKEN ADVANTAGE OF YOU FINANCIALLY OR OF YOUR PERSONAL PROPERTY?: UNABLE TO ASSESS

## 2024-01-01 SDOH — SOCIAL STABILITY: SOCIAL INSECURITY: HAS ANYONE EVER THREATENED TO HURT YOUR FAMILY OR YOUR PETS?: UNABLE TO ASSESS

## 2024-01-01 SDOH — SOCIAL STABILITY: SOCIAL INSECURITY: DOES ANYONE TRY TO KEEP YOU FROM HAVING/CONTACTING OTHER FRIENDS OR DOING THINGS OUTSIDE YOUR HOME?: UNABLE TO ASSESS

## 2024-01-01 SDOH — SOCIAL STABILITY: SOCIAL INSECURITY: HAVE YOU HAD ANY THOUGHTS OF HARMING ANYONE ELSE?: UNABLE TO ASSESS

## 2024-01-01 SDOH — SOCIAL STABILITY: SOCIAL INSECURITY: DO YOU FEEL UNSAFE GOING BACK TO THE PLACE WHERE YOU ARE LIVING?: NO

## 2024-01-01 ASSESSMENT — COGNITIVE AND FUNCTIONAL STATUS - GENERAL
PATIENT BASELINE BEDBOUND: UNABLE TO ASSESS AT THIS TIME
WALKING IN HOSPITAL ROOM: TOTAL
TURNING FROM BACK TO SIDE WHILE IN FLAT BAD: TOTAL
MOBILITY SCORE: 24
PERSONAL GROOMING: TOTAL
EATING MEALS: TOTAL
HELP NEEDED FOR BATHING: TOTAL
MOVING FROM LYING ON BACK TO SITTING ON SIDE OF FLAT BED WITH BEDRAILS: TOTAL
DAILY ACTIVITIY SCORE: 6
DRESSING REGULAR UPPER BODY CLOTHING: TOTAL
TOILETING: TOTAL
CLIMB 3 TO 5 STEPS WITH RAILING: TOTAL
EATING MEALS: TOTAL
WALKING IN HOSPITAL ROOM: TOTAL
TOILETING: TOTAL
DAILY ACTIVITIY SCORE: 6
MOBILITY SCORE: 6
PATIENT BASELINE BEDBOUND: NO
TURNING FROM BACK TO SIDE WHILE IN FLAT BAD: TOTAL
MOVING TO AND FROM BED TO CHAIR: TOTAL
MOVING TO AND FROM BED TO CHAIR: TOTAL
HELP NEEDED FOR BATHING: TOTAL
CLIMB 3 TO 5 STEPS WITH RAILING: TOTAL
DAILY ACTIVITIY SCORE: 24
DRESSING REGULAR UPPER BODY CLOTHING: TOTAL
DRESSING REGULAR LOWER BODY CLOTHING: TOTAL
DRESSING REGULAR LOWER BODY CLOTHING: TOTAL
MOVING FROM LYING ON BACK TO SITTING ON SIDE OF FLAT BED WITH BEDRAILS: TOTAL
STANDING UP FROM CHAIR USING ARMS: TOTAL
STANDING UP FROM CHAIR USING ARMS: TOTAL
PERSONAL GROOMING: TOTAL
MOBILITY SCORE: 6

## 2024-01-01 ASSESSMENT — LIFESTYLE VARIABLES
AUDIT-C TOTAL SCORE: 0
HAVE YOU EVER FELT YOU SHOULD CUT DOWN ON YOUR DRINKING: NO
SKIP TO QUESTIONS 9-10: 0
SKIP TO QUESTIONS 9-10: 1
TOTAL SCORE: 0
HOW OFTEN DO YOU HAVE 6 OR MORE DRINKS ON ONE OCCASION: NEVER
AUDIT-C TOTAL SCORE: 0
HOW MANY STANDARD DRINKS CONTAINING ALCOHOL DO YOU HAVE ON A TYPICAL DAY: PATIENT UNABLE TO ANSWER
AUDIT-C TOTAL SCORE: -1
HOW OFTEN DO YOU HAVE 6 OR MORE DRINKS ON ONE OCCASION: PATIENT UNABLE TO ANSWER
HOW OFTEN DO YOU HAVE A DRINK CONTAINING ALCOHOL: PATIENT UNABLE TO ANSWER
EVER FELT BAD OR GUILTY ABOUT YOUR DRINKING: NO
HOW MANY STANDARD DRINKS CONTAINING ALCOHOL DO YOU HAVE ON A TYPICAL DAY: PATIENT DOES NOT DRINK
HOW OFTEN DO YOU HAVE A DRINK CONTAINING ALCOHOL: NEVER
AUDIT-C TOTAL SCORE: -1
HAVE PEOPLE ANNOYED YOU BY CRITICIZING YOUR DRINKING: NO
EVER HAD A DRINK FIRST THING IN THE MORNING TO STEADY YOUR NERVES TO GET RID OF A HANGOVER: NO

## 2024-01-01 ASSESSMENT — PAIN SCALES - GENERAL
PAINLEVEL_OUTOF10: 0 - NO PAIN
PAINLEVEL_OUTOF10: 2
PAINLEVEL_OUTOF10: 0 - NO PAIN
PAINLEVEL_OUTOF10: 3
PAIN_LEVEL: 0
PAINLEVEL_OUTOF10: 0 - NO PAIN
PAINLEVEL_OUTOF10: 2
PAINLEVEL_OUTOF10: 3
PAINLEVEL_OUTOF10: 0 - NO PAIN
PAINLEVEL_OUTOF10: 0 - NO PAIN

## 2024-01-01 ASSESSMENT — PAIN - FUNCTIONAL ASSESSMENT
PAIN_FUNCTIONAL_ASSESSMENT: 0-10
PAIN_FUNCTIONAL_ASSESSMENT: 0-10
PAIN_FUNCTIONAL_ASSESSMENT: UNABLE TO SELF-REPORT
PAIN_FUNCTIONAL_ASSESSMENT: CPOT (CRITICAL CARE PAIN OBSERVATION TOOL)
PAIN_FUNCTIONAL_ASSESSMENT: 0-10
PAIN_FUNCTIONAL_ASSESSMENT: CPOT (CRITICAL CARE PAIN OBSERVATION TOOL)
PAIN_FUNCTIONAL_ASSESSMENT: CPOT (CRITICAL CARE PAIN OBSERVATION TOOL)
PAIN_FUNCTIONAL_ASSESSMENT: 0-10
PAIN_FUNCTIONAL_ASSESSMENT: CPOT (CRITICAL CARE PAIN OBSERVATION TOOL)
PAIN_FUNCTIONAL_ASSESSMENT: UNABLE TO SELF-REPORT
PAIN_FUNCTIONAL_ASSESSMENT: CPOT (CRITICAL CARE PAIN OBSERVATION TOOL)
PAIN_FUNCTIONAL_ASSESSMENT: 0-10
PAIN_FUNCTIONAL_ASSESSMENT: UNABLE TO SELF-REPORT
PAIN_FUNCTIONAL_ASSESSMENT: 0-10
PAIN_FUNCTIONAL_ASSESSMENT: 0-10

## 2024-01-01 ASSESSMENT — COLUMBIA-SUICIDE SEVERITY RATING SCALE - C-SSRS
1. IN THE PAST MONTH, HAVE YOU WISHED YOU WERE DEAD OR WISHED YOU COULD GO TO SLEEP AND NOT WAKE UP?: NO
6. HAVE YOU EVER DONE ANYTHING, STARTED TO DO ANYTHING, OR PREPARED TO DO ANYTHING TO END YOUR LIFE?: NO
6. HAVE YOU EVER DONE ANYTHING, STARTED TO DO ANYTHING, OR PREPARED TO DO ANYTHING TO END YOUR LIFE?: NO
2. HAVE YOU ACTUALLY HAD ANY THOUGHTS OF KILLING YOURSELF?: NO
2. HAVE YOU ACTUALLY HAD ANY THOUGHTS OF KILLING YOURSELF?: NO
1. IN THE PAST MONTH, HAVE YOU WISHED YOU WERE DEAD OR WISHED YOU COULD GO TO SLEEP AND NOT WAKE UP?: NO

## 2024-01-01 ASSESSMENT — ACTIVITIES OF DAILY LIVING (ADL)
BATHING: INDEPENDENT
PATIENT'S MEMORY ADEQUATE TO SAFELY COMPLETE DAILY ACTIVITIES?: YES
GROOMING: UNABLE TO ASSESS
HEARING - RIGHT EAR: UNABLE TO ASSESS
HEARING - RIGHT EAR: FUNCTIONAL
TOILETING: INDEPENDENT
ADEQUATE_TO_COMPLETE_ADL: UNABLE TO ASSESS
PATIENT'S MEMORY ADEQUATE TO SAFELY COMPLETE DAILY ACTIVITIES?: UNABLE TO ASSESS
HEARING - LEFT EAR: UNABLE TO ASSESS
HEARING - LEFT EAR: FUNCTIONAL
JUDGMENT_ADEQUATE_SAFELY_COMPLETE_DAILY_ACTIVITIES: UNABLE TO ASSESS
FEEDING YOURSELF: UNABLE TO ASSESS
DRESSING YOURSELF: INDEPENDENT
ADEQUATE_TO_COMPLETE_ADL: YES
WALKS IN HOME: DEPENDENT
GROOMING: INDEPENDENT
LACK_OF_TRANSPORTATION: PATIENT UNABLE TO ANSWER
JUDGMENT_ADEQUATE_SAFELY_COMPLETE_DAILY_ACTIVITIES: YES
BATHING: UNABLE TO ASSESS
LACK_OF_TRANSPORTATION: PATIENT UNABLE TO ANSWER
TOILETING: UNABLE TO ASSESS
WALKS IN HOME: UNABLE TO ASSESS
DRESSING YOURSELF: UNABLE TO ASSESS
FEEDING YOURSELF: INDEPENDENT

## 2024-01-01 ASSESSMENT — PATIENT HEALTH QUESTIONNAIRE - PHQ9
SUM OF ALL RESPONSES TO PHQ9 QUESTIONS 1 & 2: 0
2. FEELING DOWN, DEPRESSED OR HOPELESS: NOT AT ALL
1. LITTLE INTEREST OR PLEASURE IN DOING THINGS: NOT AT ALL

## 2024-01-01 ASSESSMENT — PAIN DESCRIPTION - DESCRIPTORS
DESCRIPTORS: DISCOMFORT
DESCRIPTORS: DISCOMFORT

## 2024-03-12 PROBLEM — D12.2 ADENOMATOUS POLYP OF ASCENDING COLON: Status: ACTIVE | Noted: 2024-01-01

## 2024-03-12 PROBLEM — D50.0 IRON DEFICIENCY ANEMIA DUE TO CHRONIC BLOOD LOSS: Status: ACTIVE | Noted: 2024-01-01

## 2024-03-12 NOTE — ASSESSMENT & PLAN NOTE
Needs EMR of cecal lesion and removal of rectosigmoid polyp when he can stop Plavix (after 6/6/24).  Return to clinic in 2 months- will schedule then.

## 2024-03-12 NOTE — PROGRESS NOTES
Gastroenterology Office Visit     History of Present Illness:   Ranjit Zurita is a 64 y.o. 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  The patient was admitted with NSTEMI in 12/23, and underwent placement of JOE in his RCA.  Now on DAPT.  I performed an EGD and colonoscopy on him during that admission for NETTE while on DAPT; see below for complete results.      Hb 8.2 during that admission- no prior baseline.  Ferritin 11.  Not transfused.  Was discharged on oral Fe daily for 30 days.      Review of Systems  Constitutional: denies fever/ chills, night sweats, wt loss and fatigue  Respiratory: denies SOB, BARNETT  CV: denies chest pain and LE edema  Neuro: denies weakness and difficulty walking      Past Medical History   has a past medical history of Atherosclerotic heart disease of native coronary artery without angina pectoris, Encounter for screening for malignant neoplasm of colon, Neuralgia and neuritis, unspecified, Pain in leg, unspecified, Pain in unspecified foot, Pain in unspecified shoulder, Personal history of other diseases of the circulatory system, Personal history of other diseases of urinary system, Personal history of other endocrine, nutritional and metabolic disease, Personal history of other endocrine, nutritional and metabolic disease, and Personal history of other specified conditions.     Problem List  Patient Active Problem List   Diagnosis    Abnormal results of cardiovascular function studies    Bruising    CAD S/P percutaneous coronary angioplasty    Carotid atherosclerosis    Carotid bruit    Carotid stent occlusion (CMS/HCC)    Chest pain    Chronic kidney disease, stage 3 (CMS/HCC)    Current smoker    Dizziness    Dyslipidemia    Essential hypertension    History of acute myocardial infarction    History of CVA (cerebrovascular accident)    History of transient ischemic attack    Intermittent claudication (CMS/HCC)    Mixed hyperlipidemia     "PAD (peripheral artery disease) (CMS/[facility])    Presence of stent in coronary artery    Slurred speech    NSTEMI (non-ST elevated myocardial infarction) (CMS/[facility])    BMI 26.0-26.9,adult    Iron deficiency anemia due to chronic blood loss    Adenomatous polyp of ascending colon       Past Surgical History  Past Surgical History:   Procedure Laterality Date    CARDIAC CATHETERIZATION N/A 12/5/2023    Procedure: Left Heart Cath, With LV;  Surgeon: Mac Lomas MD;  Location: [facility];  Service: Cardiovascular;  Laterality: N/A;    CARDIAC CATHETERIZATION N/A 12/8/2023    Procedure: PCI JOE Stent- Coronary;  Surgeon: Mac Lomas MD;  Location: [facility];  Service: Cardiovascular;  Laterality: N/A;  RCA    OTHER SURGICAL HISTORY  05/28/2019    Coronary artery stent placement       Social History   reports that he has been smoking cigarettes. He has never used smokeless tobacco. He reports that he does not currently use alcohol. He reports that he does not use drugs.     Family History  family history includes Breast cancer in his mother; Heart failure in his father; Hypertension in his father.       Allergies  Allergies   Allergen Reactions    Cyclobenzaprine Hives    Losartan Myalgia     Joint pain all over       Medications  Current Outpatient Medications   Medication Instructions    amLODIPine (NORVASC) 5 mg, oral, Daily    aspirin 81 mg EC tablet 1 tablet, oral, Daily    atorvastatin (LIPITOR) 40 mg, oral, Daily    clopidogrel (PLAVIX) 75 mg, oral, Daily    losartan (COZAAR) 50 mg, oral, Daily    metoprolol tartrate (LOPRESSOR) 25 mg, oral, 2 times daily    multivitamin tablet 1 tablet, oral, Daily    nitroglycerin (NITROSTAT) 0.4 mg, sublingual, Every 5 min PRN        Objective   Blood pressure 123/76, pulse 70, height 1.778 m (5' 10\"), weight 85.7 kg (189 lb).          LABS  As above      Radiology  No recent abdominal imaging    Endoscopy    Colonoscopy 12/23: 8 polyps " total- none removed b/c patient was on DAPT at the time of the colonoscopy for recent stent- has 40 mm TVA behind the ICV    EGD 12/23: The 2nd part of the duodenum appeared normal.  Mild, patchy erythematous mucosa in the duodenal bulb;  Mild, patchy abnormal mucosa with erosion in the fundus of the stomach, body of the stomach and antrum;  Performed forceps biopsies in the body of the stomach, incisura and antrum to rule out H. pylori  Small hiatal hernia  Non-obstructing Schatzki ring in the GE junction  Single 5 mm x 7 mm mucosal nodule was visualized in the GE junction; performed cold forceps biopsy    Assessment/Plan   Ranjit Zurita is a 64 y.o. male who presents to GI clinic for NETTE and colon polyps.    Iron deficiency anemia due to chronic blood loss  Check labs today.     Adenomatous polyp of ascending colon  Needs EMR of cecal lesion and removal of rectosigmoid polyp/ 6 other polyps when he can stop Plavix for 5 days (after 6/6/24).  Return to clinic in 2 months- will schedule then.      NSTEMI (non-ST elevated myocardial infarction) (CMS/HCC)  S/p JOE in 12/23.  As above.        Dk Bragg MD

## 2024-03-13 NOTE — PATIENT INSTRUCTIONS
Office visit the beginning of May with an EKG  Carotid duplex to be done again in 1 year  Follow up office visit in 1 year.    Continue same medications/treatment.  Patient educated on proper medication use.  Patient educated on risk factor modification.  Please bring any lab results from other providers / physicians to your next appointment.    Please bring all medicines, vitamins and herbal supplements with you when you come to the office.    Prescriptions will not be filled unless you are compliant with your follow up appointments or have a follow up  appointment scheduled as per instruction of your physician.  Refills should be requested at the time of  Your visit.    Lisa GARDNER LPN, am scribing for and in the presence of Dr. Mac Lomas MD, FACC

## 2024-03-13 NOTE — PROGRESS NOTES
CARDIOLOGY OFFICE VISIT      CHIEF COMPLAINT      HISTORY OF PRESENT ILLNESS  The patient states he has been doing well.  He is back to work without any problems.  He states he recently saw GI and his hemoglobin was good.  He has not had any further bleeding.  He is scheduled to have surgery remove his polyps and June this year.  I told like to see him in the beginning of May this year with an EKG to make sure he is okay prior to the surgery.  We will keep him on his dual antiplatelets for at least 6 months after his and STEMI on 12/8/2023.  He denies chest discomfort or symptoms of myocardial ischemia.  He denies any significant dyspnea.  He denies palpitations and syncope.  He denies symptoms of transient ischemic nausea attacks.  I did go over results of his recent carotid ultrasound with him which are satisfactory.      IMPRESSION:   1. Coronary artery disease, no angina.  2. Non-ST-segment elevation myocardial infarction in 2017 and December 2023.  3. Multivessel percutaneous coronary intervention including unprotected left main stenting with drug-eluting stent, October 30, 2017, most recent PCI JOE of proximal RCA 12/8/2023.  4. Carotid artery disease, remote left internal carotid artery stenting, in-stent restenosis with subsequent balloon angioplasty with good results.  5. Moderate atherosclerotic disease and proximal left subclavian artery.  6. Remote cerebrovascular accident with no significant residual disease.  7. Mixed hyperlipidemia.  8. Essential hypertension.  9. Tobacco abuse  10. Overweight.  11. Chronic kidney disease, stage III.  12.  Iron deficiency anemia secondary to GI bleed secondary to polyps as described above, December 2023, awaiting subsequent GI procedure, please see above        Please excuse any errors in grammar or translation related to this dictation. Voice recognition software was utilized to prepare this document.             Past Medical History  Past Medical History:    Diagnosis Date    Atherosclerotic heart disease of native coronary artery without angina pectoris     3-vessel CAD    Encounter for screening for malignant neoplasm of colon     Encounter for screening colonoscopy    Neuralgia and neuritis, unspecified     Neuralgia    Pain in leg, unspecified     Lower extremity pain    Pain in unspecified foot     Foot pain    Pain in unspecified shoulder     Shoulder pain    Personal history of other diseases of the circulatory system     History of hypertension    Personal history of other diseases of urinary system     History of chronic kidney disease    Personal history of other endocrine, nutritional and metabolic disease     History of high cholesterol    Personal history of other endocrine, nutritional and metabolic disease     History of hyperlipidemia    Personal history of other specified conditions     History of fatigue       Social History  Social History     Tobacco Use    Smoking status: Every Day     Types: Cigarettes    Smokeless tobacco: Never    Tobacco comments:     Had smoked uped to 1 PPD, currently 5-6 a day   Substance Use Topics    Alcohol use: Not Currently     Comment: rare    Drug use: Never       Family History     Family History   Problem Relation Name Age of Onset    Breast cancer Mother      Hypertension Father      Heart failure Father          Allergies:  Allergies   Allergen Reactions    Cyclobenzaprine Hives    Losartan Myalgia     Joint pain all over        Outpatient Medications:  Current Outpatient Medications   Medication Instructions    amLODIPine (NORVASC) 5 mg, oral, Daily    aspirin 81 mg EC tablet 1 tablet, oral, Daily    atorvastatin (LIPITOR) 40 mg, oral, Daily    clopidogrel (PLAVIX) 75 mg, oral, Daily    losartan (COZAAR) 50 mg, oral, Daily    metoprolol tartrate (LOPRESSOR) 25 mg, oral, 2 times daily    multivitamin tablet 1 tablet, oral, Daily    nitroglycerin (NITROSTAT) 0.4 mg, sublingual, Every 5 min PRN          REVIEW OF  SYSTEMS  Review of Systems   All other systems reviewed and are negative.        VITALS  Vitals:    03/13/24 1253   BP: 120/72   Pulse: 72       PHYSICAL EXAM  Constitutional:       Appearance: Healthy appearance. Not in distress.   Eyes:      Conjunctiva/sclera: Conjunctivae normal.      Pupils: Pupils are equal, round, and reactive to light.   Neck:      Vascular: No JVR. JVD normal.   Pulmonary:      Effort: Pulmonary effort is normal.      Breath sounds: Normal breath sounds. No wheezing. No rhonchi. No rales.   Chest:      Chest wall: Not tender to palpatation.   Cardiovascular:      PMI at left midclavicular line. Normal rate. Regular rhythm. Normal S1. Normal S2.       Murmurs: There is no murmur.      No gallop.  No click. No rub.   Pulses:     Intact distal pulses.   Edema:     Peripheral edema absent.   Abdominal:      Tenderness: There is no abdominal tenderness.   Musculoskeletal: Normal range of motion.         General: No tenderness.      Cervical back: Normal range of motion. Skin:     General: Skin is warm and dry.   Neurological:      General: No focal deficit present.      Mental Status: Alert and oriented to person, place and time.           ASSESSMENT AND PLAN  Diagnoses and all orders for this visit:  CAD S/P percutaneous coronary angioplasty  NSTEMI (non-ST elevated myocardial infarction) (CMS/HCC)  Atherosclerosis of both carotid arteries  Carotid stent occlusion, subsequent encounter  History of CVA (cerebrovascular accident)  Mixed hyperlipidemia  Essential hypertension  Stage 3a chronic kidney disease (CMS/HCC)  Iron deficiency anemia due to chronic blood loss  BMI 26.0-26.9,adult  Current smoker      [unfilled]

## 2024-05-08 PROBLEM — M79.2 NEURALGIA: Status: ACTIVE | Noted: 2024-01-01

## 2024-05-08 PROBLEM — M79.673 PAIN OF FOOT: Status: ACTIVE | Noted: 2024-01-01

## 2024-05-08 PROBLEM — F17.200 NICOTINE DEPENDENCE, UNCOMPLICATED: Status: ACTIVE | Noted: 2018-09-22

## 2024-05-08 PROBLEM — R53.83 OTHER FATIGUE: Status: ACTIVE | Noted: 2018-10-19

## 2024-05-08 PROBLEM — I77.1 SUBCLAVIAN ARTERY STENOSIS, LEFT (CMS-HCC): Status: ACTIVE | Noted: 2024-01-01

## 2024-05-08 PROBLEM — Z86.39 HISTORY OF ELEVATED LIPIDS: Status: ACTIVE | Noted: 2024-01-01

## 2024-05-08 PROBLEM — I25.2 HISTORY OF ACUTE MYOCARDIAL INFARCTION: Status: RESOLVED | Noted: 2023-01-01 | Resolved: 2024-01-01

## 2024-05-08 PROBLEM — Z95.5 PRESENCE OF CORONARY ANGIOPLASTY IMPLANT AND GRAFT: Status: RESOLVED | Noted: 2018-09-22 | Resolved: 2024-01-01

## 2024-05-08 PROBLEM — M25.511 PAIN IN RIGHT SHOULDER: Status: ACTIVE | Noted: 2018-09-22

## 2024-05-08 PROBLEM — Z86.79 HISTORY OF HYPERTENSION: Status: ACTIVE | Noted: 2024-01-01

## 2024-05-08 PROBLEM — Z95.5 PRESENCE OF STENT IN CORONARY ARTERY: Status: RESOLVED | Noted: 2023-01-01 | Resolved: 2024-01-01

## 2024-05-08 PROBLEM — D64.9 ANEMIA: Status: ACTIVE | Noted: 2023-01-01

## 2024-05-08 PROBLEM — Z95.5 PRESENCE OF CORONARY ANGIOPLASTY IMPLANT AND GRAFT: Status: ACTIVE | Noted: 2018-09-22

## 2024-05-08 PROBLEM — Z86.39 HISTORY OF METABOLIC DISORDER: Status: ACTIVE | Noted: 2024-01-01

## 2024-05-08 NOTE — PROGRESS NOTES
CARDIOLOGY OFFICE VISIT      CHIEF COMPLAINT      HISTORY OF PRESENT ILLNESS  The patient states she has been doing well since I saw him in March.  He has not had any further problem with bleeding.  He is scheduled to undergo surgery for his polyps in his colon in June of this year.  I did discuss with him about stopping his aspirin and Plavix 1 week prior to the surgery.  He denies chest discomfort or symptoms of myocardial ischemia.  He denies dyspnea on exertion.  He has not used nitroglycerin.  He denies palpitations syncope.  He denies symptoms of transient ischemic neurologic attacks.    EKG: Normal sinus rhythm, left axis deviation, complete right bundle branch block, results discussed with patient    IMPRESSION:   1. Coronary artery disease, no angina.  2. Non-ST-segment elevation myocardial infarction in 2017 and December 2023.  3. Multivessel percutaneous coronary intervention including unprotected left main stenting with drug-eluting stent, October 30, 2017, most recent PCI JOE of proximal RCA 12/8/2023.  4. Carotid artery disease, remote left internal carotid artery stenting, in-stent restenosis with subsequent balloon angioplasty with good results.  5. Moderate atherosclerotic disease and proximal left subclavian artery.  6. Remote cerebrovascular accident with no significant residual disease.  7. Mixed hyperlipidemia.  8. Essential hypertension.  9. Tobacco abuse  10. Overweight.  11. Chronic kidney disease, stage III.  12.  Iron deficiency anemia secondary to GI bleed secondary to polyps as described above, December 2023, awaiting subsequent GI procedure, please see above  13.  Cardiac conduction system disease manifested by left axis deviation and complete right bundle branch block     Patient is an acceptable cardiovascular risk for upcoming bowel procedure with Dr. Yemi MD, assuming preoperative lab work is appropriate. Advised to hold Aspirin and Plavix x7 days preop.      Please excuse any  errors in grammar or translation related to this dictation. Voice recognition software was utilized to prepare this document.     Past Medical History  Past Medical History:   Diagnosis Date    Atherosclerotic heart disease of native coronary artery without angina pectoris     3-vessel CAD    Encounter for screening for malignant neoplasm of colon     Encounter for screening colonoscopy    Neuralgia and neuritis, unspecified     Neuralgia    Pain in leg, unspecified     Lower extremity pain    Pain in unspecified foot     Foot pain    Pain in unspecified shoulder     Shoulder pain    Personal history of other diseases of the circulatory system     History of hypertension    Personal history of other diseases of urinary system     History of chronic kidney disease    Personal history of other endocrine, nutritional and metabolic disease     History of high cholesterol    Personal history of other endocrine, nutritional and metabolic disease     History of hyperlipidemia    Personal history of other specified conditions     History of fatigue       Social History  Social History     Tobacco Use    Smoking status: Every Day     Types: Cigarettes    Smokeless tobacco: Never    Tobacco comments:     Had smoked uped to 1 PPD, currently 5-6 a day   Substance Use Topics    Alcohol use: Not Currently     Comment: rare    Drug use: Never       Family History     Family History   Problem Relation Name Age of Onset    Breast cancer Mother      Hypertension Father      Heart failure Father          Allergies:  Allergies   Allergen Reactions    Cyclobenzaprine Hives    Losartan Myalgia     Joint pain all over        Outpatient Medications:  Current Outpatient Medications   Medication Instructions    amLODIPine (NORVASC) 5 mg, oral, Daily    aspirin 81 mg EC tablet 1 tablet, oral, Daily    atorvastatin (LIPITOR) 40 mg, oral, Daily    clopidogrel (PLAVIX) 75 mg, oral, Daily    metoprolol tartrate (LOPRESSOR) 25 mg, oral, 2 times  daily    multivitamin tablet 1 tablet, oral, Daily    nitroglycerin (NITROSTAT) 0.4 mg, sublingual, Every 5 min PRN          REVIEW OF SYSTEMS  Review of Systems   All other systems reviewed and are negative.        VITALS  Vitals:    05/08/24 1320   BP: 104/70   Pulse: 66       PHYSICAL EXAM  Vitals reviewed.   Constitutional:       Appearance: Normal and healthy appearance. Well-developed and not in distress.   Eyes:      Conjunctiva/sclera: Conjunctivae normal.      Pupils: Pupils are equal, round, and reactive to light.   Neck:      Vascular: No JVR. JVD normal.   Pulmonary:      Effort: Pulmonary effort is normal.      Breath sounds: Normal breath sounds. No wheezing. No rhonchi. No rales.   Chest:      Chest wall: Not tender to palpatation.   Cardiovascular:      PMI at left midclavicular line. Normal rate. Regular rhythm. Normal S1. Normal S2.       Murmurs: There is no murmur.      No gallop.  No click. No rub.   Pulses:     Intact distal pulses.   Edema:     Peripheral edema absent.   Abdominal:      Tenderness: There is no abdominal tenderness.   Musculoskeletal: Normal range of motion.         General: No tenderness.      Cervical back: Normal range of motion. Skin:     General: Skin is warm and dry.   Neurological:      General: No focal deficit present.      Mental Status: Alert and oriented to person, place and time.   Psychiatric:         Behavior: Behavior is cooperative.           ASSESSMENT AND PLAN  Diagnoses and all orders for this visit:  CAD S/P percutaneous coronary angioplasty  Mixed hyperlipidemia  Atherosclerosis of both carotid arteries  Carotid stent occlusion, subsequent encounter  Dyslipidemia  Essential hypertension  History of hypertension  PAD (peripheral artery disease) (CMS-[facility])  BMI 25.0-25.9,adult  History of CVA (cerebrovascular accident)  Iron deficiency anemia due to chronic blood loss  Stage 3a chronic kidney disease (Multi)  Nicotine dependence, uncomplicated, unspecified  nicotine product type      [unfilled]   Implemented All Universal Safety Interventions:  [place] to call system. Call bell, personal items and telephone within reach. Instruct patient to call for assistance. Room bathroom lighting operational. Non-slip footwear when patient is off stretcher. Physically safe environment: no spills, clutter or unnecessary equipment. Stretcher in lowest position, wheels locked, appropriate side rails in place.

## 2024-05-08 NOTE — PATIENT INSTRUCTIONS
Patient to follow up as scheduled in future with Dr. Mac Mathew MD      Patient is cleared for pending bowel procedure with Dr. Bragg in future.   Advised to hold Aspirin and Plavix x7 days preop.     Encouraged to quit smoking- heart healthy education given today.   No other changes today.   Continue same medications and treatments.   Patient educated on proper medication use.   Patient educated on risk factor modification.   Please bring any lab results from other providers / physicians to your next appointment.     Please bring all medicines, vitamins, and herbal supplements with you when you come to the office.     Prescriptions will not be filled unless you are compliant with your follow up appointments or have a follow up appointment scheduled as per instruction of your physician. Refills should be requested at the time of your visit.    IPawan RN am scribing for and in the presence of Dr. Mac Lomas MD

## 2024-05-08 NOTE — H&P (VIEW-ONLY)
CARDIOLOGY OFFICE VISIT      CHIEF COMPLAINT      HISTORY OF PRESENT ILLNESS  The patient states she has been doing well since I saw him in March.  He has not had any further problem with bleeding.  He is scheduled to undergo surgery for his polyps in his colon in June of this year.  I did discuss with him about stopping his aspirin and Plavix 1 week prior to the surgery.  He denies chest discomfort or symptoms of myocardial ischemia.  He denies dyspnea on exertion.  He has not used nitroglycerin.  He denies palpitations syncope.  He denies symptoms of transient ischemic neurologic attacks.    EKG: Normal sinus rhythm, left axis deviation, complete right bundle branch block, results discussed with patient    IMPRESSION:   1. Coronary artery disease, no angina.  2. Non-ST-segment elevation myocardial infarction in 2017 and December 2023.  3. Multivessel percutaneous coronary intervention including unprotected left main stenting with drug-eluting stent, October 30, 2017, most recent PCI JOE of proximal RCA 12/8/2023.  4. Carotid artery disease, remote left internal carotid artery stenting, in-stent restenosis with subsequent balloon angioplasty with good results.  5. Moderate atherosclerotic disease and proximal left subclavian artery.  6. Remote cerebrovascular accident with no significant residual disease.  7. Mixed hyperlipidemia.  8. Essential hypertension.  9. Tobacco abuse  10. Overweight.  11. Chronic kidney disease, stage III.  12.  Iron deficiency anemia secondary to GI bleed secondary to polyps as described above, December 2023, awaiting subsequent GI procedure, please see above  13.  Cardiac conduction system disease manifested by left axis deviation and complete right bundle branch block     Patient is an acceptable cardiovascular risk for upcoming bowel procedure with Dr. Yemi MD, assuming preoperative lab work is appropriate. Advised to hold Aspirin and Plavix x7 days preop.      Please excuse any  errors in grammar or translation related to this dictation. Voice recognition software was utilized to prepare this document.     Past Medical History  Past Medical History:   Diagnosis Date    Atherosclerotic heart disease of native coronary artery without angina pectoris     3-vessel CAD    Encounter for screening for malignant neoplasm of colon     Encounter for screening colonoscopy    Neuralgia and neuritis, unspecified     Neuralgia    Pain in leg, unspecified     Lower extremity pain    Pain in unspecified foot     Foot pain    Pain in unspecified shoulder     Shoulder pain    Personal history of other diseases of the circulatory system     History of hypertension    Personal history of other diseases of urinary system     History of chronic kidney disease    Personal history of other endocrine, nutritional and metabolic disease     History of high cholesterol    Personal history of other endocrine, nutritional and metabolic disease     History of hyperlipidemia    Personal history of other specified conditions     History of fatigue       Social History  Social History     Tobacco Use    Smoking status: Every Day     Types: Cigarettes    Smokeless tobacco: Never    Tobacco comments:     Had smoked uped to 1 PPD, currently 5-6 a day   Substance Use Topics    Alcohol use: Not Currently     Comment: rare    Drug use: Never       Family History     Family History   Problem Relation Name Age of Onset    Breast cancer Mother      Hypertension Father      Heart failure Father          Allergies:  Allergies   Allergen Reactions    Cyclobenzaprine Hives    Losartan Myalgia     Joint pain all over        Outpatient Medications:  Current Outpatient Medications   Medication Instructions    amLODIPine (NORVASC) 5 mg, oral, Daily    aspirin 81 mg EC tablet 1 tablet, oral, Daily    atorvastatin (LIPITOR) 40 mg, oral, Daily    clopidogrel (PLAVIX) 75 mg, oral, Daily    metoprolol tartrate (LOPRESSOR) 25 mg, oral, 2 times  daily    multivitamin tablet 1 tablet, oral, Daily    nitroglycerin (NITROSTAT) 0.4 mg, sublingual, Every 5 min PRN          REVIEW OF SYSTEMS  Review of Systems   All other systems reviewed and are negative.        VITALS  Vitals:    05/08/24 1320   BP: 104/70   Pulse: 66       PHYSICAL EXAM  Vitals reviewed.   Constitutional:       Appearance: Normal and healthy appearance. Well-developed and not in distress.   Eyes:      Conjunctiva/sclera: Conjunctivae normal.      Pupils: Pupils are equal, round, and reactive to light.   Neck:      Vascular: No JVR. JVD normal.   Pulmonary:      Effort: Pulmonary effort is normal.      Breath sounds: Normal breath sounds. No wheezing. No rhonchi. No rales.   Chest:      Chest wall: Not tender to palpatation.   Cardiovascular:      PMI at left midclavicular line. Normal rate. Regular rhythm. Normal S1. Normal S2.       Murmurs: There is no murmur.      No gallop.  No click. No rub.   Pulses:     Intact distal pulses.   Edema:     Peripheral edema absent.   Abdominal:      Tenderness: There is no abdominal tenderness.   Musculoskeletal: Normal range of motion.         General: No tenderness.      Cervical back: Normal range of motion. Skin:     General: Skin is warm and dry.   Neurological:      General: No focal deficit present.      Mental Status: Alert and oriented to person, place and time.   Psychiatric:         Behavior: Behavior is cooperative.           ASSESSMENT AND PLAN  Diagnoses and all orders for this visit:  CAD S/P percutaneous coronary angioplasty  Mixed hyperlipidemia  Atherosclerosis of both carotid arteries  Carotid stent occlusion, subsequent encounter  Dyslipidemia  Essential hypertension  History of hypertension  PAD (peripheral artery disease) (CMS-[facility])  BMI 25.0-25.9,adult  History of CVA (cerebrovascular accident)  Iron deficiency anemia due to chronic blood loss  Stage 3a chronic kidney disease (Multi)  Nicotine dependence, uncomplicated, unspecified  nicotine product type      [unfilled]

## 2024-05-21 NOTE — ASSESSMENT & PLAN NOTE
1) Please schedule colonoscopy at [facility] with [facility] for EMR of cecal lesion  2) You may not eat any solid foods the ENTIRE day before the procedure.  You may have clear liquids, including water, Sprite/ginger ale, apple juice, chicken broth, Jello, tea/coffee or Gatorade.  Please do not have any red or purple liquids/ Jello. No milk or cream in your tea/coffee.   3) Take the prep as directed.   4) You will need a  for the procedure.   5) You will need to contact your cardiologist or primary provider if you are taking a blood thinner.  This may need to be stopped for the procedure.

## 2024-05-21 NOTE — PROGRESS NOTES
Gastroenterology Office Visit     History of Present Illness:   Since last OV in 3/24, patient still needs colonoscopy for removal of polyps.  Had JOE in 12/23, on Plavix.  Cardiologist OK with him coming off Plavix for 7 days.        OV in 3/24:  Ranjit Zurita is a 64 y.o. 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  The patient was admitted with NSTEMI in 12/23, and underwent placement of JOE in his RCA.  Now on DAPT.  I performed an EGD and colonoscopy on him during that admission for NETTE while on DAPT; see below for complete results.      Hb 8.2 during that admission- no prior baseline.  Ferritin 11.  Not transfused.  Was discharged on oral Fe daily for 30 days.      Review of Systems  Constitutional: denies fever/ chills, night sweats, wt loss and fatigue  Respiratory: denies SOB, BARNETT  CV: denies chest pain and LE edema  Neuro: denies weakness and difficulty walking      Past Medical History   has a past medical history of Atherosclerotic heart disease of native coronary artery without angina pectoris, Encounter for screening for malignant neoplasm of colon, Neuralgia and neuritis, unspecified, Pain in leg, unspecified, Pain in unspecified foot, Pain in unspecified shoulder, Personal history of other diseases of the circulatory system, Personal history of other diseases of urinary system, Personal history of other endocrine, nutritional and metabolic disease, Personal history of other endocrine, nutritional and metabolic disease, and Personal history of other specified conditions.     Problem List  Patient Active Problem List   Diagnosis    Abnormal results of cardiovascular function studies    Bruising    CAD S/P percutaneous coronary angioplasty    Carotid atherosclerosis    Carotid bruit    Carotid stent occlusion (CMS/HCC)    Chest pain    Chronic kidney disease, stage 3 (CMS/HCC)    Current smoker    Dizziness    Dyslipidemia    Essential hypertension    History  of acute myocardial infarction    History of CVA (cerebrovascular accident)    History of transient ischemic attack    Intermittent claudication (CMS/[facility])    Mixed hyperlipidemia    PAD (peripheral artery disease) (CMS/[facility])    Presence of stent in coronary artery    Slurred speech    NSTEMI (non-ST elevated myocardial infarction) (CMS/[facility])    BMI 26.0-26.9,adult    Iron deficiency anemia due to chronic blood loss    Adenomatous polyp of ascending colon       Past Surgical History  Past Surgical History:   Procedure Laterality Date    CARDIAC CATHETERIZATION N/A 12/5/2023    Procedure: Left Heart Cath, With LV;  Surgeon: Mac Lomas MD;  Location: [facility];  Service: Cardiovascular;  Laterality: N/A;    CARDIAC CATHETERIZATION N/A 12/8/2023    Procedure: PCI JOE Stent- Coronary;  Surgeon: Mac Lomas MD;  Location: [facility];  Service: Cardiovascular;  Laterality: N/A;  RCA    OTHER SURGICAL HISTORY  05/28/2019    Coronary artery stent placement       Social History   reports that he has been smoking cigarettes. He has never used smokeless tobacco. He reports that he does not currently use alcohol. He reports that he does not use drugs.     Family History  family history includes Breast cancer in his mother; Heart failure in his father; Hypertension in his father.       Allergies  Allergies   Allergen Reactions    Cyclobenzaprine Hives    Losartan Myalgia     Joint pain all over       Medications  Current Outpatient Medications on File Prior to Visit   Medication Sig Dispense Refill    amLODIPine (Norvasc) 10 mg tablet Take 0.5 tablets (5 mg) by mouth once daily. 30 tablet 5    aspirin 81 mg EC tablet Take 1 tablet (81 mg) by mouth once daily.      atorvastatin (Lipitor) 40 mg tablet TAKE 1 TABLET BY MOUTH EVERY DAY 90 tablet 3    clopidogrel (Plavix) 75 mg tablet Take 1 tablet (75 mg) by mouth once daily. 90 tablet 3    metoprolol tartrate (Lopressor) 25 mg tablet  "TAKE 1 TABLET TWICE DAILY 180 tablet 3    multivitamin tablet Take 1 tablet by mouth once daily.      nitroglycerin (Nitrostat) 0.4 mg SL tablet Place 1 tablet (0.4 mg) under the tongue every 5 minutes if needed for chest pain (up to 3 doses). 25 tablet 5     No current facility-administered medications on file prior to visit.           Objective   /72 (BP Location: Right arm, Patient Position: Sitting, BP Cuff Size: Adult)   Pulse 74   Resp 18   Ht 1.778 m (5' 10\")   Wt 83.7 kg (184 lb 9.6 oz)   SpO2 93%   BMI 26.49 kg/m²         LABS  Component      Latest Ref Rng 12/6/2023   LEUKOCYTES (10*3/UL) IN BLOOD BY AUTOMATED COUNT, Faroese      4.4 - 11.3 x10*3/uL 6.4    nRBC      0.0 - 0.0 /100 WBCs 0.0    ERYTHROCYTES (10*6/UL) IN BLOOD BY AUTOMATED COUNT, Faroese      4.50 - 5.90 x10*6/uL 3.98 (L)    HEMOGLOBIN      13.5 - 17.5 g/dL 9.1 (L)    HEMATOCRIT      41.0 - 52.0 % 30.6 (L)    MCV      80 - 100 fL 77 (L)    MCH      26.0 - 34.0 pg 22.9 (L)    MCHC      32.0 - 36.0 g/dL 29.7 (L)    RED CELL DISTRIBUTION WIDTH      11.5 - 14.5 % 16.4 (H)    PLATELETS (10*3/UL) IN BLOOD AUTOMATED COUNT, Faroese      150 - 450 x10*3/uL 203      Component      Latest Ref Rng 12/7/2023   LEUKOCYTES (10*3/UL) IN BLOOD BY AUTOMATED COUNT, Faroese      4.4 - 11.3 x10*3/uL 6.6    nRBC      0.0 - 0.0 /100 WBCs 0.0    ERYTHROCYTES (10*6/UL) IN BLOOD BY AUTOMATED COUNT, Faroese      4.50 - 5.90 x10*6/uL 4.17 (L)    HEMOGLOBIN      13.5 - 17.5 g/dL 9.6 (L)    HEMATOCRIT      41.0 - 52.0 % 32.3 (L)    MCV      80 - 100 fL 78 (L)    MCH      26.0 - 34.0 pg 23.0 (L)    MCHC      32.0 - 36.0 g/dL 29.7 (L)    RED CELL DISTRIBUTION WIDTH      11.5 - 14.5 % 16.3 (H)    PLATELETS (10*3/UL) IN BLOOD AUTOMATED COUNT, Faroese      150 - 450 x10*3/uL 207             Radiology  No recent abdominal imaging    Endoscopy    Colonoscopy 12/23: 8 polyps total- none removed b/c patient was on DAPT at the time of the colonoscopy for recent " stent- has 40 mm TVA behind the ICV    EGD 12/23: The 2nd part of the duodenum appeared normal.  Mild, patchy erythematous mucosa in the duodenal bulb;  Mild, patchy abnormal mucosa with erosion in the fundus of the stomach, body of the stomach and antrum;  Performed forceps biopsies in the body of the stomach, incisura and antrum to rule out H. pylori  Small hiatal hernia  Non-obstructing Schatzki ring in the GE junction  Single 5 mm x 7 mm mucosal nodule was visualized in the GE junction; performed cold forceps biopsy    Assessment/Plan   Ranjit Zurita is a 64 y.o. male who presents to GI clinic for NETTE and colon polyps.    Adenomatous polyp of ascending colon  Needs EMR of cecal lesion and removal of rectosigmoid polyp/ 6 other polyps when he can stop Plavix for 5 days (after 6/6/24).  Will schedule with [facility] at [facility].      NSTEMI (non-ST elevated myocardial infarction) (CMS/HCC)  S/p JOE in 12/23.  As above.        kD Bragg MD

## 2024-05-21 NOTE — PATIENT INSTRUCTIONS
1) Please schedule colonoscopy at [facility] with [facility] for EMR of cecal lesion  2) You may not eat any solid foods the ENTIRE day before the procedure.  You may have clear liquids, including water, Sprite/ginger ale, apple juice, chicken broth, Jello, tea/coffee or Gatorade.  Please do not have any red or purple liquids/ Jello. No milk or cream in your tea/coffee.   3) Take the prep as directed.   4) You will need a  for the procedure.   5) You will need to contact your cardiologist or primary provider if you are taking a blood thinner.  This may need to be stopped for the procedure.

## 2024-06-06 NOTE — DISCHARGE INSTRUCTIONS
NPO 4 HOURS  CLEAR LIQUID DIET 24 HOURS, THEN ADVANCE   Patient Instructions after a Colonoscopy      The anesthetics, sedatives or narcotics which were given to you today will be acting in your body for the next 24 hours, so you might feel a little sleepy or groggy.  This feeling should slowly wear off. Carefully read and follow the instructions.     You received sedation today:  - Do not drive or operate any machinery or power tools of any kind.   - No alcoholic beverages today, not even beer or wine.  - Do not make any important decisions or sign any legal documents.  - No over the counter medications that contain alcohol or that may cause drowsiness.  - Do not make any important decisions or sign any legal documents.    While it is common to experience mild to moderate abdominal distention, gas, or belching after your procedure, if any of these symptoms occur following discharge from the GI Lab or within one week of having your procedure, call the [facility] to be advised whether a visit to your nearest Urgent Care or Emergency Department is indicated.  Take this paper with you if you go.     - If you develop an allergic reaction to the medications that were given during your procedure such as difficulty breathing, rash, hives, severe nausea, vomiting or lightheadedness.  - If you experience chest pain, shortness of breath, severe abdominal pain, fevers and chills.  -If you develop signs and symptoms of bleeding such as blood in your spit, if your stools turn black, tarry, or bloody  - If you have not urinated within 8 hours following your procedure.  - If your IV site becomes painful, red, inflamed, or looks infected.    If you received a biopsy/polypectomy/sphincterotomy the following instructions apply below:    __ Do not use Aspirin containing products, non-steroidal medications or anti-coagulants for one week following your procedure. (Examples of these types of medications are: Advil,  Arthrotec, Aleve, Coumadin, Ecotrin, Heparin, Ibuprofen, Indocin, Motrin, Naprosyn, Nuprin, Plavix, Vioxx, and Voltarin, or their generic forms.  This list is not all-inclusive.  Check with your physician or pharmacist before resuming medications.)   __ Eat a soft diet today.  Avoid foods that are poorly digested for the next 24 hours.  These foods would include: nuts, beans, lettuce, red meats, and fried foods. Start with liquids and advance your diet as tolerated, gradually work up to eating solids.   __ Do not have a Barium Study or Enema for one week.    Your physician recommends the additional following instructions:    -You have a contact number available for emergencies. The signs and symptoms of potential delayed complications were discussed with you. You may return to normal activities tomorrow.  -Resume your previous diet.  -Continue your present medications.   -We are waiting for your pathology results.  -Your physician has recommended a repeat colonoscopy (date to be determined after pending pathology results are reviewed) for surveillance based on pathology results.  -The findings and recommendations have been discussed with you.  -The findings and recommendations were discussed with your family.  - Please see Medication Reconciliation Form for new medication/medications prescribed.       If you experience any problems or have any questions following discharge from the GI Lab, please call:        Nurse Signature                                                                        Date___________________                                                                            Patient/Responsible Party Signature                                        Date___________________

## 2024-06-06 NOTE — ANESTHESIA POSTPROCEDURE EVALUATION
Patient: Ranjit Zurita    Procedure Summary       Date: 06/06/24 Room / Location: [facility]    Anesthesia Start: 0742 Anesthesia Stop: 0853    Procedure: COLONOSCOPY Diagnosis:       Adenomatous polyp of ascending colon      Adenomatous polyp of ascending colon    Scheduled Providers: Lucas Carter MD Responsible Provider: Trever Minaya DO    Anesthesia Type: general ASA Status: 3            Anesthesia Type: general    Vitals Value Taken Time   /64 06/06/24 0852   Temp 36 06/06/24 0855   Pulse 88 06/06/24 0855   Resp 19 06/06/24 0854   SpO2 96 % 06/06/24 0854   Vitals shown include unfiled device data.    Anesthesia Post Evaluation    Patient location during evaluation: PACU  Patient participation: complete - patient participated  Level of consciousness: awake  Pain score: 0  Pain management: adequate  Multimodal analgesia pain management approach  Airway patency: patent  Two or more strategies used to mitigate risk of obstructive sleep apnea  Cardiovascular status: acceptable  Respiratory status: acceptable  Hydration status: acceptable  Postoperative Nausea and Vomiting: none        There were no known notable events for this encounter.     Complex Repair And Melolabial Flap Text: The defect edges were debeveled with a #15 scalpel blade.  The primary defect was closed partially with a complex linear closure.  Given the location of the remaining defect, shape of the defect and the proximity to free margins a melolabial flap was deemed most appropriate for complete closure of the defect.  Using a sterile surgical marker, an appropriate advancement flap was drawn incorporating the defect and placing the expected incisions within the relaxed skin tension lines where possible.    The area thus outlined was incised deep to adipose tissue with a #15 scalpel blade.  The skin margins were undermined to an appropriate distance in all directions utilizing iris scissors.

## 2024-06-06 NOTE — ANESTHESIA PREPROCEDURE EVALUATION
Patient: Ranjit Zurita    Procedure Information       Date/Time: 06/06/24 0730    Scheduled providers: Lucas Carter MD    Procedure: COLONOSCOPY    Location: [facility]            Relevant Problems   Cardiac   (+) CAD S/P percutaneous coronary angioplasty   (+) Chest pain   (+) Essential hypertension   (+) Mixed hyperlipidemia   (+) NSTEMI (non-ST elevated myocardial infarction) (Multi)   (+) PAD (peripheral artery disease) (CMS-HCC)   (+) Subclavian artery stenosis, left (CMS-[facility])      Neuro   (+) Carotid atherosclerosis   (+) Carotid stent occlusion ([facility])      Hematology   (+) Anemia   (+) Iron deficiency anemia due to chronic blood loss       Clinical information reviewed:                   NPO Detail:  No data recorded     Physical Exam    Airway  Mallampati: II  TM distance: >3 FB  Neck ROM: full     Cardiovascular - normal exam  Rhythm: regular  Rate: normal     Dental - normal exam     Pulmonary - normal exam     Abdominal - normal exam  Abdomen: soft             Anesthesia Plan    History of general anesthesia?: yes  History of complications of general anesthesia?: no    ASA 3     general     The patient is not a current smoker.  Patient was previously instructed to abstain from smoking on day of procedure.  Patient did not smoke on day of procedure.  Education provided regarding risk of obstructive sleep apnea.  intravenous induction   Postoperative administration of opioids is intended.  Anesthetic plan and risks discussed with patient.  Use of blood products discussed with patient who.    Plan discussed with CAA and CRNA.

## 2024-06-08 PROBLEM — I21.3 STEMI (ST ELEVATION MYOCARDIAL INFARCTION) (MULTI): Status: ACTIVE | Noted: 2024-01-01

## 2024-06-08 NOTE — ED PROVIDER NOTES
HPI: The patient is a 64-year-old male with history of CAD status post previous MIs who had a colonoscopy yesterday who presents to the Emergency Department with a chief complaint of shortness of breath.  Patient reports has been short of breath since after his procedure yesterday.  He does report persistent diarrhea and poor p.o. intake and poor urine output.  He reports no chest pain but has had worsening and worsening symptoms until he came in for assessment.  Denies any significant chest pain and does report some mild abdominal pain.  No black or bloody bowel movements.  No swelling his legs.  No fevers or chills.     PAST MEDICAL HISTORY:  as per HPI  ALLERGIES:  as per HPI  MEDICATIONS:  as per HPI  FAMILY HISTORY: as per HPI  SURGICAL HISTORY: as per HPI  SOCIAL HISTORY: as per HPI     PHYSICAL EXAM:  VITAL SIGNS: Nursing notes reviewed.  GENERAL:  Alert and interactive  EYES:   Eyes track.  ENT:  Airway patent.  RESPIRATORY: Labored breathing.  Clear breath sounds  CARDIOVASCULAR: Regular rhythm, tachycardic  GASTROINTESTINAL:  No distension.  Diffusely tender but no rebound rigidity or guarding  MUSCULOSKELETAL:  No deformity.   NEUROLOGICAL:  Awake.  SKIN:  Dry.  Diaphoretic        MEDICAL DECISION MAKING (MDM):     Critical Care Time  Authorized and Performed by: Chris Ray MD  Total critical care time: 79 minutes  Due to a high probability of clinically significant, life threatening deterioration, the patient required my highest level of preparedness to intervene emergently and I personally spent this critical care time directly and personally managing the patient. This critical care time included obtaining a history; examining the patient; pulse oximetry; ordering and review of studies; arranging urgent treatment with development of a management plan; evaluation of patient's response to treatment; frequent reassessment; and, discussions with other providers and patient/family.  This critical care  time was performed to assess and manage the high probability of imminent, life-threatening deterioration that could result in multi-organ failure. It was exclusive of separately billable procedures and treating other patients and teaching time.  Please see MDM section and the rest of the note for further information on patient assessment and treatment.    Procedure: cardioversion  Performed by: Chris Ray MD  Indication: dysrhythmia  Risks, benefits, alternatives were discussed - patient provided informed verbal consent to proceed.  Patient [was] sedated for this procedure with Versed  Initial rhythm: Wide-complex tachycardia  Cardioversion was performed, including [1] total attempts with 200 J .  Post-procedure rhythm: Wide-complex tachycardia  Procedure was well tolerated.  There were no immediate complications.      EKG 0241  Per my interpretation:  Electrocardiogram ECG  RATE: 144  RHYTHM: Wide-complex tachycardia  AXIS: Left axis  INTERVALS: Prolonged QRS  ST-T WAVE CHANGES: ST segment elevation and depression  ABNORMALITIES/COMPARISON: New wide-complex tachycardia when compared to most recent EKG in her system from December 6, 2023    EKG 0254  Per my interpretation:  Electrocardiogram ECG  RATE:  136  RHYTHM: Wide-complex tachycardia  AXIS: Left axis  INTERVALS: Prolonged QRS  ST-T WAVE CHANGES: ST segment elevation and depression  ABNORMALITIES/COMPARISON: Unchanged    EKG 0310  Per my interpretation:  Electrocardiogram ECG  RATE:  121  RHYTHM: Sinus tachycardia  AXIS: Left axis  INTERVALS: Prolonged QRS  ST-T WAVE CHANGES: ST segment elevation and depression  ABNORMALITIES/COMPARISON: Unchanged    EKG 0328  Per my interpretation:  Electrocardiogram ECG  RATE:  121  RHYTHM: Sinus tachycardia  AXIS: Left axis  INTERVALS: Prolonged QRS  ST-T WAVE CHANGES: ST segment elevation and depression  ABNORMALITIES/COMPARISON: Unchanged     SUMMARY:  The patient is admitted to the Emergency Department for evaluation  of above. Complete history and physical examination was performed by me.  Patient presents tachycardic with a rapid respiratory rate and abdominal breathing as well as diaphoretic and looks unwell.  An EKG was immediately obtained which showed a wide-complex tachycardia.  I was initially concerned that it could be ventricular tachycardia or could be a large STEMI with cardiogenic shock given his elevated heart rate and poor color and diaphoresis.  I did a bedside ultrasound which did not show any right heart strain and showed a very good EF without focal wall motion abnormalities.  There is no pericardial effusion either.  Ultrasound the lungs was concerning for pulmonary edema given B-lines diffusely.  Due to concern that this could be ventricular tachycardia, the pads were placed and the patient is given 2 mg of Versed for sedation and we cardioverted him at 200 J.  This did not change the heart rate at all and at this point I did notice some variability in the heart rate which pointed more towards a sinus tachycardia with a bundle branch block and away from ventricular tachycardia.  I consulted our on-call interventional cardiologist, Dr. So who also came to the bedside.  I discussed my concern that this could be an occlusive MI.  He felt that this was more likely metabolic or from sepsis and did not think that this was a STEMI and wanted to hold off on emergent PCI.  He also recommended starting the patient on amiodarone which was started.  We gave the patient a bolus of amnio and started on infusion.  At this point we had received a venous blood gas which showed a profound metabolic acidosis with a pH of 7.1 and a left elevated lactate.  I was concerned about a complication of his procedure causing perforated viscus or another acute intra-abdominal catastrophe.  Patient seem to be tiring out from his rapid respiratory rate and this was likely worsened by the Versed that we use to sedate him.  He was still  protecting his airway and arousable so we placed him on noninvasive positive pressure ventilation to help improve his work of breathing and send him for CTA of his chest and pelvis.  CTA per my interpretation did not show any significant pulmonary edema nor did show perforated viscus.  Patient's repeat gas improved on an PPV and his mental state also seem to improve so at this time and I believe he needs to be intubated.  I got a call from the radiologist who reported that there is a carcinoid tumor but there is no arterial occlusion, dissection, PE or significant evidence of pulmonary edema or a significant infection.  I am still worried about sepsis and there was some evidence of aspiration so he was given broad-spectrum antibiotics.  We got multiple EKGs which not show any evolution of the ST segment changes but his troponin came back significantly elevated as well as his BNP came back elevated which is new from 6 months ago.  I rediscussed the case with our interventional cardiologist and did discuss my concern that he was having persistent symptoms and cardiac dysfunction in the setting of an NSTEMI.  Dr. Sharp agreed that the patient needed to be cathed but felt that the patient would be better served going to the ICU and having further resuscitation and then could receive a cath in the morning once he was more stable.  Patient was started on heparin for NSTEMI and discussed the case with the ICU and the patient was excepted for further evaluation and care.     DIAGNOSIS:    Acute respiratory failure  Metabolic acidosis  Sepsis  NSTEMI     DISPOSITION:    1) ICU       Chris Ray MD  06/08/24 [id number]

## 2024-06-08 NOTE — PROCEDURES
Point of Care Ultrasound Note    Indication: STEMI; Suspected Cardiogenic Shock    LV Function: Severely depressed, likely ~ 10% LVEF. Global hypokinesis noted with minimal septal wall motion.  LA: Mildly Dilated  RV Function: Appears normal to slightly depressed  IVC: Dilated, non-collapsible  Lungs: B-Lines present indicative of pulmonary edema    Impression:  Patient presenting with shortness of breath, troponin elevation >100,000, ST elevations in leads I, aVL, aVR, V1 through V6 with reciprocal depressions in leads II, III, aVF.  Bedside POCUS with depressed EF of approximately 10%, global hypokinesis, septal wall appears to be akinetic.  Patient had recent PCI 12/8/23 with JOE placed to RCA, was placed on aspirin and Plavix.  Plavix recently held for colonoscopy 6/6.    In the setting of the above with POCUS findings of depressed LVEF with global wall motion abnormalities suspect that patient had large ischemic event now with cardiogenic shock.    Plan:  Intubated, mechanically ventilated for respiratory distress.  Norepinpehrine for hemodynamic support.  Spoke with interventional cardiologist Dr. Marychuy Wan for cath lab for ischemic evaluation, possible PCI, and mechanical circulatory support if warranted.  Return to ICU vs Transfer to [facility] for higher level of care pending [facility].       Jaden Way, APRN-CNP  Pulmonary & Critical Care Medicine  [facility]

## 2024-06-08 NOTE — Clinical Note
The patient experienced a cardiac arrest during the procedure. Patient arrested briefly, ROSC was obtained with brief CPR.

## 2024-06-08 NOTE — H&P
[facility] Critical Care Medicine       Date:  6/8/2024  Patient:  Ranjit Zurita  YOB: 1960  MRN:  [id number]   Admit Date:  6/8/2024  ========================================================================================================    Chief Complaint   Patient presents with    Shortness of Breath     X2 days, increased this evening, speaking in 1-2 word sentences with expiratory wheezes, and sweating.         History of Present Illness:  Ranjit Zurita is a 64 y.o. year old male patient with significant PMH listed below including CAD with prior PCI and JOE (2017, 12/2023) on DAPT, carotid artery atherosclerosis s/p carotid artery stent with in-stent restenosis s/p successful balloon angioplasty, recurrent NSTEMI, HTN, HLD, PAD, TIA, CKD 3, NETTE, presented to the ED with shortness of breath.  Patient states shortness of breath started Thursday which is yesterday, patient also had colonoscopy yesterday.  Patient also notes diarrhea although he did have colonoscopy prep.  Also complains of decreased intake and poor urine output.  Patient denies chest pain, fevers, palpitations, GI pain, abnormal bleeding, dysuria, headaches, or productive cough.  Patient admits to continuing to smoke cigarettes.    When patient came in he was apparently tachycardic concern for wide-complex tachycardia, patient was also short of breath, patient was given Versed and cardioverted at 200 J, however it did not change heart rate.  Patient was almost intubated however he was placed on BiPAP and seems stable on BiPAP.  Initially a code purple was called on this patient given his EKG however interventional cardiology came to bedside and felt that patient does need Cath Lab however they wanted him taken to the ICU to better stabilize.  Feels this is more NSTEMI compared to STEMI.  Patient was given IV amiodarone bolus started on infusion, also started on heparin drip.  CT negative for PE, suggestive of pneumonia and COPD,  there are trace bilateral pleural effusions however they are very small.  I do not suspect that this is pulmonary edema.  Patient was given broad-spectrum antibiotics IV Zosyn and vancomycin in ED, blood cultures were obtained.      Interval ICU Events:  Admitted to ICU from ED on 6/8 for acute respiratory failure with hypoxia, on BiPAP.  Patient is still diaphoretic, tachypneic with some mild retractions noted but states he feels okay he is just tired because he has not slept in 2 days.  Patient denies chest pain or any other complaints.    Medical History:  Past Medical History:   Diagnosis Date    Adenomatous polyp of ascending colon 03/12/2024    Atherosclerotic heart disease of native coronary artery without angina pectoris     3-vessel CAD    CAD S/P percutaneous coronary angioplasty 10/13/2023    Carotid atherosclerosis 10/13/2023    CKD (chronic kidney disease), stage III (Multi)     Encounter for screening for malignant neoplasm of colon     Encounter for screening colonoscopy    History of transient ischemic attack 10/13/2023    Hyperlipidemia     Hypertension     Intermittent claudication (CMS-HCC) 10/13/2023    Iron deficiency anemia due to chronic blood loss 03/12/2024    Neuralgia and neuritis, unspecified     Neuralgia    PAD (peripheral artery disease) (CMS-HCC) 10/13/2023    Subclavian artery stenosis, left (CMS-HCC) 05/08/2024     Past Surgical History:   Procedure Laterality Date    CARDIAC CATHETERIZATION N/A 12/05/2023    Procedure: Left Heart Cath, With LV;  Surgeon: Mac Lomas MD;  Location: [facility];  Service: Cardiovascular;  Laterality: N/A;    CARDIAC CATHETERIZATION N/A 12/08/2023    Procedure: PCI JOE Stent- Coronary;  Surgeon: Mac Lomas MD;  Location: [facility];  Service: Cardiovascular;  Laterality: N/A;  RCA    OTHER SURGICAL HISTORY  05/28/2019    Carotid artery stent placement     (Not in a hospital admission)    Cyclobenzaprine  and Losartan  Social History     Tobacco Use    Smoking status: Every Day     Current packs/day: 0.25     Types: Cigarettes    Smokeless tobacco: Never    Tobacco comments:     Had smoked uped to 1 PPD, currently 5-6 a day   Vaping Use    Vaping status: Never Used   Substance Use Topics    Alcohol use: Not Currently     Comment: rare    Drug use: Never     Family History   Problem Relation Name Age of Onset    Breast cancer Mother      Hypertension Father      Heart failure Father         Hospital Medications:    amiodarone, 0.5-1 mg/min, Last Rate: 1 mg/min (06/08/24 0342)  heparin, 0-4,500 Units/hr, Last Rate: 1,500 Units/hr (06/08/24 0422)          Current Facility-Administered Medications:     amiodarone (Nexterone) 360 mg in dextrose,iso-osm 200 mL (1.8 mg/mL) infusion (premix), 0.5-1 mg/min, intravenous, Continuous, Torres Mejía PA-C, Last Rate: 33.3 mL/hr at 06/08/24 0342, 1 mg/min at 06/08/24 0342    heparin 25,000 Units in dextrose 5% 250 mL (100 Units/mL) infusion (premix), 0-4,500 Units/hr, intravenous, Continuous, Chris Ray MD, Last Rate: 15 mL/hr at 06/08/24 0422, 1,500 Units/hr at 06/08/24 0422    heparin bolus from bag 3,000-6,000 Units, 3,000-6,000 Units, intravenous, q4h PRN, Chris Ray MD    lactated Ringer's bolus 1,000 mL, 1,000 mL, intravenous, Once, Chris Ray MD, Last Rate: 500 mL/hr at 06/08/24 0417, 1,000 mL at 06/08/24 0417    oxygen (O2) therapy, , inhalation, Continuous PRN - O2/gases, Chris Ray MD    piperacillin-tazobactam (Zosyn) in sodium chloride 0.9 % 100 mL IV 4.5 g, 4.5 g, intravenous, Once, Chris Ray MD    vancomycin (Vancocin) 2 g in dextrose 5% 500 mL IV, 2 g, intravenous, Once, Chris Ray MD    Current Outpatient Medications:     amLODIPine (Norvasc) 10 mg tablet, Take 0.5 tablets (5 mg) by mouth once daily., Disp: 30 tablet, Rfl: 5    aspirin 81 mg EC tablet, Take 1 tablet (81 mg) by mouth once daily., Disp: , Rfl:     atorvastatin  (Lipitor) 40 mg tablet, TAKE 1 TABLET BY MOUTH EVERY DAY, Disp: 90 tablet, Rfl: 3    clopidogrel (Plavix) 75 mg tablet, Take 1 tablet (75 mg) by mouth once daily., Disp: 90 tablet, Rfl: 3    metoprolol tartrate (Lopressor) 25 mg tablet, TAKE 1 TABLET TWICE DAILY, Disp: 180 tablet, Rfl: 3    multivitamin tablet, Take 1 tablet by mouth once daily., Disp: , Rfl:     nitroglycerin (Nitrostat) 0.4 mg SL tablet, Place 1 tablet (0.4 mg) under the tongue every 5 minutes if needed for chest pain (up to 3 doses)., Disp: 25 tablet, Rfl: 5    Review of Systems:  14 point review of systems was completed and negative except for those specially mention in my HPI    Physical Exam:    Heart Rate:  [123-145]   Temperature:  [35.2 °C (95.4 °F)]   Respirations:  [16-34]   BP: ()/(68-87)   Height:  [182.9 cm (6')]   Weight:  [81.6 kg (179 lb 14.3 oz)]   Pulse Ox:  [94 %-100 %]     Physical Exam  Vitals and nursing note reviewed.   Constitutional:       Appearance: He is normal weight. He is ill-appearing and diaphoretic.   HENT:      Head: Normocephalic.      Right Ear: External ear normal.      Left Ear: External ear normal.      Nose: Nose normal.      Mouth/Throat:      Mouth: Mucous membranes are dry.      Pharynx: Oropharynx is clear.   Eyes:      General: No scleral icterus.     Extraocular Movements: Extraocular movements intact.      Pupils: Pupils are equal, round, and reactive to light.   Cardiovascular:      Rate and Rhythm: Regular rhythm. Tachycardia present.      Pulses: Normal pulses.      Heart sounds: No murmur heard.  Pulmonary:      Breath sounds: Wheezing present.      Comments: Tachypnea, retractions present, currently on BiPAP, diffuse wheezing, rhonchi in the bases  Abdominal:      General: Abdomen is flat. Bowel sounds are normal.      Palpations: Abdomen is soft.   Musculoskeletal:         General: No swelling. Normal range of motion.      Cervical back: Normal range of motion.   Skin:     General: Skin is  warm.      Capillary Refill: Capillary refill takes less than 2 seconds.   Neurological:      General: No focal deficit present.      Mental Status: He is alert and oriented to person, place, and time.         Objective:    I have reviewed all medications, laboratory results, and imaging pertinent for today's encounter.    FiO2 (%):  [60 %-70 %] 60 %    No intake or output data in the 24 hours ending 06/08/24 0506    Results for orders placed or performed during the hospital encounter of 06/08/24 (from the past 24 hour(s))   Gray Top   Result Value Ref Range    Extra Tube Hold for add-ons.    CBC and Auto Differential   Result Value Ref Range    WBC 27.7 (H) 4.4 - 11.3 x10*3/uL    nRBC 0.0 0.0 - 0.0 /100 WBCs    RBC 4.08 (L) 4.50 - 5.90 x10*6/uL    Hemoglobin 11.9 (L) 13.5 - 17.5 g/dL    Hematocrit 38.8 (L) 41.0 - 52.0 %    MCV 95 80 - 100 fL    MCH 29.2 26.0 - 34.0 pg    MCHC 30.7 (L) 32.0 - 36.0 g/dL    RDW 13.2 11.5 - 14.5 %    Platelets 291 150 - 450 x10*3/uL    Neutrophils % 86.5 40.0 - 80.0 %    Immature Granulocytes %, Automated 0.7 0.0 - 0.9 %    Lymphocytes % 4.6 13.0 - 44.0 %    Monocytes % 8.1 2.0 - 10.0 %    Eosinophils % 0.0 0.0 - 6.0 %    Basophils % 0.1 0.0 - 2.0 %    Neutrophils Absolute 23.97 (H) 1.20 - 7.70 x10*3/uL    Immature Granulocytes Absolute, Automated 0.19 0.00 - 0.70 x10*3/uL    Lymphocytes Absolute 1.28 1.20 - 4.80 x10*3/uL    Monocytes Absolute 2.25 (H) 0.10 - 1.00 x10*3/uL    Eosinophils Absolute 0.00 0.00 - 0.70 x10*3/uL    Basophils Absolute 0.03 0.00 - 0.10 x10*3/uL   Comprehensive metabolic panel   Result Value Ref Range    Glucose 326 (H) 74 - 99 mg/dL    Sodium 137 136 - 145 mmol/L    Potassium 4.0 3.5 - 5.3 mmol/L    Chloride 99 98 - 107 mmol/L    Bicarbonate 15 (L) 21 - 32 mmol/L    Anion Gap 27 (H) 10 - 20 mmol/L    Urea Nitrogen 27 (H) 6 - 23 mg/dL    Creatinine 2.00 (H) 0.50 - 1.30 mg/dL    eGFR 37 (L) >60 mL/min/1.73m*2    Calcium 9.1 8.6 - 10.3 mg/dL    Albumin 4.0 3.4 -  5.0 g/dL    Alkaline Phosphatase 79 33 - 136 U/L    Total Protein 6.7 6.4 - 8.2 g/dL     (H) 9 - 39 U/L    Bilirubin, Total 0.9 0.0 - 1.2 mg/dL     (H) 10 - 52 U/L   Protime-INR   Result Value Ref Range    Protime 14.2 (H) 9.8 - 12.8 seconds    INR 1.3 (H) 0.9 - 1.1   B-Type Natriuretic Peptide   Result Value Ref Range    BNP 1,362 (H) 0 - 99 pg/mL   Troponin I, High Sensitivity, Initial   Result Value Ref Range    Troponin I, High Sensitivity 102,665 (HH) 0 - 20 ng/L   Acute Toxicology Panel, Blood   Result Value Ref Range    Acetaminophen <10.0 10.0 - 30.0 ug/mL    Salicylate  <3 4 - 20 mg/dL    Alcohol <10 <=10 mg/dL   Magnesium   Result Value Ref Range    Magnesium 2.12 1.60 - 2.40 mg/dL   Phosphorus   Result Value Ref Range    Phosphorus 7.5 (H) 2.5 - 4.9 mg/dL   BLOOD GAS VENOUS FULL PANEL   Result Value Ref Range    POCT pH, Venous 7.10 (LL) 7.33 - 7.43 pH    POCT pCO2, Venous 45 41 - 51 mm Hg    POCT pO2, Venous 35 35 - 45 mm Hg    POCT SO2, Venous 42 (L) 45 - 75 %    POCT Oxy Hemoglobin, Venous 41.2 (L) 45.0 - 75.0 %    POCT Hematocrit Calculated, Venous 34.0 (L) 41.0 - 52.0 %    POCT Sodium, Venous 136 136 - 145 mmol/L    POCT Potassium, Venous 3.8 3.5 - 5.3 mmol/L    POCT Chloride, Venous 98 98 - 107 mmol/L    POCT Ionized Calicum, Venous 1.20 1.10 - 1.33 mmol/L    POCT Glucose, Venous 304 (H) 74 - 99 mg/dL    POCT Lactate, Venous 13.1 (HH) 0.4 - 2.0 mmol/L    POCT Base Excess, Venous -15.1 (L) -2.0 - 3.0 mmol/L    POCT HCO3 Calculated, Venous 14.0 (L) 22.0 - 26.0 mmol/L    POCT Hemoglobin, Venous 11.4 (L) 13.5 - 17.5 g/dL    POCT Anion Gap, Venous 28.0 (H) 10.0 - 25.0 mmol/L    Patient Temperature      FiO2 36 %    Critical Called By JMT RRT     Critical Called To      Critical Call Time 302     Critical Read Back Y    Blood Gas Arterial Full Panel   Result Value Ref Range    POCT pH, Arterial 7.21 (LL) 7.38 - 7.42 pH    POCT pCO2, Arterial 44 (H) 38 - 42 mm Hg    POCT pO2,  Arterial 32 (LL) 85 - 95 mm Hg    POCT SO2, Arterial 41 (L) 94 - 100 %    POCT Oxy Hemoglobin, Arterial 39.9 (L) 94.0 - 98.0 %    POCT Hematocrit Calculated, Arterial 34.0 (L) 41.0 - 52.0 %    POCT Sodium, Arterial 130 (L) 136 - 145 mmol/L    POCT Potassium, Arterial 4.6 3.5 - 5.3 mmol/L    POCT Chloride, Arterial 99 98 - 107 mmol/L    POCT Ionized Calcium, Arterial 1.21 1.10 - 1.33 mmol/L    POCT Glucose, Arterial 231 (H) 74 - 99 mg/dL    POCT Lactate, Arterial 9.3 (HH) 0.4 - 2.0 mmol/L    POCT Base Excess, Arterial -9.9 (L) -2.0 - 3.0 mmol/L    POCT HCO3 Calculated, Arterial 17.6 (L) 22.0 - 26.0 mmol/L    POCT Hemoglobin, Arterial 11.4 (L) 13.5 - 17.5 g/dL    POCT Anion Gap, Arterial 18 10 - 25 mmo/L    Patient Temperature      FiO2 60 %    Critical Called By RT JT     Critical Called To DR SHARIF     Critical Call Time 400     Critical Read Back Y    Lactate   Result Value Ref Range    Lactate 3.3 (H) 0.4 - 2.0 mmol/L      CT angio chest abdomen pelvis  Narrative: Interpreted By:  Jaime Hickman,   STUDY:  CT ANGIO CHEST ABDOMEN PELVIS;  6/8/2024 3:18 am      INDICATION:  Signs/Symptoms:Tachycardia, respiratory failure, severe metabolic  acidosis, abdominal pain, colonoscopy yesterday, concern for  abdominal catastrophe or pulmonary edema or PE.      COMPARISON:  None.      ACCESSION NUMBER(S):  [id number]      ORDERING CLINICIAN:  CALOS SHARIF      TECHNIQUE:  Contiguous axial images of the chest, abdomen and pelvis were  obtained after the intravenous administration of  contrast. Coronal  and sagittal reformatted images were obtained from the axial images.  MIPS and 3D reformatted images were also performed and reviewed.      FINDINGS:  CT CHEST:      The examination is limited secondary to motion.      No axillary, mediastinal, hilar lymphadenopathy.      The heart is normal in size. Coronary artery atherosclerotic  calcifications. No significant pericardial effusion. No evidence of  aortic aneurysm or  dissection. The ascending aorta measures 3.2 cm in  diameter.      Opacity right mainstem bronchus compatible with aspirated content.  Evaluation of the lungs is limited secondary motion. There is  pulmonary emphysema. There is mild opacity in the right upper lobe  posteriorly 6 mm nodular opacity right lower lobe. There is bilateral  septal thickening And small pleural effusions compatible with edema.  Bibasilar atelectasis. No pneumothorax.      Mild esophageal wall thickening.      Multilevel degenerative change of the thoracic spine.      CT ABDOMEN PELVIS:      Evaluation the abdomen and pelvis is limited secondary to motion.      No evidence of liver mass. The gallbladder is present. No dilatation  of the common bile duct.      The pancreas, spleen and adrenal glands appear unremarkable.      1.6 cm soft tissue nodular lesion superior to the right kidney.  Asymmetric atrophy of the right kidney. No left hydronephrosis.      No evidence of abdominal aortic aneurysm or dissection.  Atherosclerotic calcification of the aorta and abdominal and pelvic  vasculature. The celiac artery is patent. There is atherosclerotic  disease of the superior mesenteric artery which is however patent.  There is atherosclerotic disease and stenosis of the right renal  artery. The left renal artery is patent. There is atherosclerotic  disease abdomen of caliber of the inferior mesenteric artery.      There is a 2 cm soft tissue nodular mass with punctate calcification  in the right lower abdomen/pelvis abutting a bowel loop with  tethering of the mesentery.      No evidence of bowel obstruction. Surgical clips in the distal  transverse colon and in the distal sigmoid colon/rectum. There is  underdistention versus wall thickening of the transverse, descending  sigmoid colon.      Urinary bladder is underdistended and not well evaluated.      Prostatomegaly.      No acute fracture of the lumbar spine.      Impression: 1.   No evidence  of aortic aneurysm or dissection.      2.   2 cm soft tissue nodular mass with punctate calcification in the  right lower abdomen/pelvis abutting a bowel loop with associated  tethering of the mesentery. The finding is highly concerning for the  carcinoid tumor, and further evaluation and workup is recommended.      3.  1.6 cm soft tissue nodular lesion superior to the right kidney,  enlarged lymph node or benign or malignant etiologies are not  excluded and comparison with available outside prior imaging  recommended and attention on progress imaging.      4.  Underdistention versus mild wall thickening of the transverse,  descending and sigmoid colon which may be infectious or inflammatory  in etiology.      5.   Asymmetric atrophy of the right kidney.      6.   Opacity in right mainstem bronchus compatible with aspirated  content. There is also evidence of asymmetric right bronchial wall  thickening. Opacity in the posterior right upper lobe concerning for  pneumonia. Groundglass opacities and bilateral septal thickening and  small pleural effusions compatible with edema.      7.   6 mm right lower lobe pulmonary nodule; attention on progress  imaging recommended for further evaluation.      8.   Esophageal wall thickening may be secondary to esophagitis,  however follow-up upper endoscopy recommended to exclude other  underlying esophageal pathology.      MACRO:  None      Signed by: Jaime Hickman 6/8/2024 4:04 AM  Dictation workstation:   [id number]  XR chest 1 view  Narrative: Interpreted By:  Carmina Jason,   STUDY:  XR CHEST 1 VIEW;  6/8/2024 2:55 am      INDICATION:  Signs/Symptoms:cp      COMPARISON:  Chest x-ray 12/04/2023.      ACCESSION NUMBER(S):  [id number]      ORDERING CLINICIAN:  RUBEN SALAS      TECHNIQUE:  Portable frontal view of the chest was obtained .      FINDINGS:  The patient is in a lordotic position.      Monitoring wires are overlying the patient.      The cardiomediastinal  silhouette is within normal limits.      There is diffuse bilateral interstitial thickening. There are  ill-defined bilateral airspace opacities. There are trace bilateral  pleural effusions. No pneumothorax.      Impression: 1. Diffuse bilateral interstitial thickening with ill-defined  bilateral airspace opacities, may be secondary to pulmonary edema  and/or pneumonia.  2. Trace bilateral pleural effusions.              MACRO:  None.      Signed by: Carmina Jason 6/8/2024 3:21 AM  Dictation workstation:   [id number]     Assessment/Plan:    I am currently managing this critically ill patient for the following problems:    Acute Respiratory Failure with Hypoxia  Cardiogenic Shock  Sepsis  Pneumonia  Emphysematous COPD   NSTEMI v STEMI  Wide Complex Tachycardia  High AG Metabolic Acidosis  ANTHONY on CKD III  Transaminitis  HTN, HLD  CAD w/ prior PCI w/ JOE  Anemia d/t CBL, NETTE  Tobacco abuse  Abnormal CT A/P      Neuro/Psych/Pain Ctrl/Sedation:  -Monitor neurostatus, risk for CO2 narcosis    Respiratory/ENT:  Acute Respiratory Failure with Hypoxia  Emphysematous COPD   Tobacco abuse  -Titrate FiO2 to maintain SpO2 greater than 90%  -Monitor for CO2 narcosis  -Xopenex bronchodilators for now given tachycardia  -Continue BiPAP therapy right now for increased work of breathing, wean down as able  -Morphine for increased work of breathing, tachypnea, pain  -Nicotine patch if patient request, tobacco education cessation counseling prior to discharge    Cardiovascular:  Cardiogenic Shock  NSTEMI v STEMI  Wide Complex Tachycardia s/p Cardioversion  HTN, HLD  CAD w/ prior PCI w/ JOE  -Trop > 102,000, denies chest pain, only SOB, given renal and hepatic impairment along w/ ECG and Ttop, oft BP's, strong suspicion for cardiogenic shock  -Suspected wide-complex tachycardia in ED, status post cardioversion 200 J, no significant change heart rate variable  -Given amiodarone bolus followed by infusion per Dr. Hyman  request  -Continue DAPT  -Heparin drip  -Consult cardiology, repeat EKG in the morning  -Oxygen and morphine as needed for any pain or shortness of breath  -Likely going to Cath Lab today if respiratory status improves    GI:  Transaminitis  Abnormal CT A/P  -Underwent colonoscopy yesterday, CT concerning for carcinoid tumor, will need to follow-up with GI  -Monitor LFTs    Renal/Volume Status (Intra & Extravascular):  High AG Metabolic Acidosis  ANTHONY on CKD III  -Patient admits to poor intake, diarrhea although patient just had colonoscopy prep, poor urine output, patient is tachycardic, I believe patient is dry  -Strict intake and output, monitor renal function  -Avoid nephrotoxic drugs  -Monitor acid-base balance, no need for sodium bicarb drip at this time  -Repeat lactate until downtrending    Endocrine  Hyperglycemia  -Nondiabetic, likely reactive  -Check hemoglobin A1c, monitor glucose    Infectious Disease:  Sepsis  Pneumonia  -Right now most identifiable source appears to be pneumonia, doubt aspiration as patient did not aspirate during colonoscopy and he does not remember aspirating  -WBCs 27 with left shift but afebrile, tachycardic with lactic acidosis  -Given vancomycin and Zosyn in ED, will continue, add azithromycin  -Obtain sputum culture  -Check urine for Legionella and strep  -Check procalcitonin  -Evaluate blood culture results in 48 hours  -Give 1 L LR bolus over 2 hours x 1 now    Heme/Onc:  Anemia d/t CBL, NETTE  -Follows GI, on Feosol, will continue    OBGYN/MSK:  -Up as tolerated with assistance    Ethics/Code Status:  Full code    :  DVT Prophylaxis: Heparin infusion  GI Prophylaxis: Protonix  Bowel Regimen: N/A  Diet: N.p.o. except sips with meds  CVC: N/A  [place]: N/A  [place]: N/A  Restraints: N/A  Dispo: ICU    Critical Care Time:  35 minutes    ARUN Anthony-CNP  Division of Pulmonary & Critical Care Medicine

## 2024-06-08 NOTE — DISCHARGE SUMMARY
Discharge Diagnosis  NSTEMI (non-ST elevated myocardial infarction) (Multi)    Issues Requiring Follow-Up  Cardiogenic Shock   STEMI  CAD  Respiratory Failure     Test Results Pending At Discharge  Pending Labs       Order Current Status    Blood Gas Arterial Full Panel In process    Blood Culture Preliminary result    Blood Culture Preliminary result            Hospital Course   Ranjit Zurita is a 64 y.o. year old male patient with significant PMH listed below including CAD with prior PCI and JOE (2017, 12/2023) on DAPT, carotid artery atherosclerosis s/p carotid artery stent with in-stent restenosis s/p successful balloon angioplasty, recurrent NSTEMI, HTN, HLD, PAD, TIA, CKD 3, NETTE, presented to [facility] emergency department 6/8 with complaints of shortness of breath since Thursday 6/6.     Patient states that on Thursday 6/6 he had a colonoscopy performed.  He then he back pain and chest pain with radiation to both shoulders.  This lasted about 24 hours and then subsided, which is when he had sudden increasing shortness of breath.  He has not been taking his Plavix due to the planned colonoscopy.  He said the chest pain was different than when he had his prior MI.  He denies any fevers or illnesses.  Denies any current chest pain or palpitations.  Denies any nausea or vomiting.  Complains of decreased intake and decreased urine output.  Continues to smoke cigarettes daily.     ED Course  On arrival to ED patient was diaphoretic.  EKG showed what was thought to be a wide-complex tachycardia and he was complaining of shortness of breath, therefore he was giving Versed and cardioverted 200 J without any change in heart rate or rhythm.  He was afebrile, heart rate 130, /69 with MAP 93, oxygen saturation 97% but he had significant work of breathing and respiratory distress therefore was placed on NIMV with BiPAP.  Laboratory workup revealed leukocytosis with WBC 27.7.  Chemistry showed ANTHONY with creatinine 2.0,  bicarb 15.  BNP 1362.  VBG showed metabolic/lactic acidosis with pH 7.10, pCO2 45, lactate 13, HCO3 14.0.  Troponin elevated to 102,665.  EKG showed ST elevations in leads I, aVL, aVR, V1 1 through V6 with reciprocal depressions in leads II, III, aVF.  Per ED provider documentation he did a bedside ultrasound which showed normal LVEF without wall motion abnormalities.  A STEMI alert was called but given POCUS findings and respiratory status patient was not taken to the Cath Lab.  He was given IV fluid and broad-spectrum antibiotics as it was thought that his troponin elevation and acidosis may have been metabolic/infectious in nature.  He was also given amiodarone bolus and infusion without improvement of heart rate or rhythm.  CT PE study was done which was negative for pulmonary embolism, showed opacity in right upper lobe and right mainstem bronchus concerning for possible aspiration and possible pneumonia, as well as bilateral groundglass opacities concerning for edema.  He was continued on BiPAP and admitted to the ICU for further care.     Interval ICU Events:  6/8: Upon morning assessment patient awake, alert, orient x 3.  Significant respiratory distress on BiPAP 40% FiO2 with respiratory rate 40.  Remains tachycardic with heart rate 120.  Normotensive.  EKG continues to show anterolateral changes concerning for acute myocardial infarction.  Troponin 106,000.  Bedside POCUS done which shows severely depressed EF, global hypokinesis with minimal septal wall motion, concerning for large infarction.  There is current concern that patient has cardiogenic shock, with continued elevated lactate at 3.3, acute kidney injury, elevated LFTs, metabolic acidosis.  Due to respiratory distress decision was made to intubate patient placed on mechanical ventilation.  Given 324 mg aspirin, 180 mg Brilinta. Dr. Hyman of cath lab called -will plan to take patient for ischemic evaluation, placement of Impella.  [place] [facility] [place]    Cath Lab Course:    atient underwent right and left heart catheterization showing:  Severely reduced LV systolic function <20%  Elevated LVeDP ~38mmHg  Elevated right and left heart filling pressures  PWP 34,  PA 51/35 (42), RV 48/15 (16), RA 14mmHg; LVeDP 38mmHg.  CO 4.94, CI 2.42     Right coronary artery system dominance  Severely reduced LV systolic function  Patent stents to RCA  In-stent restenosis with occlusion of prox LAD     [place] Ti placed.     S/P successful mechanical circulatory support with Impella CP placement.      Patient had cardiac arrest for < 1 min during Impella placement, recovered well afterwards with no need for vasoactive drugs.     Shock team was activated. Discussion of Primary PCI to LAD versus conservative management. After thorough discussion, it was decided that both strategies would be reasonable. Salvage PCI with delayed presentation would be questionable, however also helpful and reasonable once it could help to improve the LV function in the long run. Therefore we opted to Primary PCI to LAD and to transfer the patient to CICU at [facility] afterwards.     S/P Successful Primary PCI to prox-mid LAD using aspiration atherectomy and one drug-eluting stent 2.75/22mm (post-dil 3.0mm NC balloon), guided by IVUS. Successful POBA to 1st Dg.    [place] [facility] [place].     Pertinent Physical Exam At Time of Discharge  Physical Exam    Home Medications     Medication List      ASK your doctor about these medications     amLODIPine 10 mg tablet; Commonly known as: Norvasc; Take 0.5 tablets (5   mg) by mouth once daily.   aspirin 81 mg EC tablet   atorvastatin 40 mg tablet; Commonly known as: Lipitor; TAKE 1 TABLET BY   MOUTH EVERY DAY   clopidogrel 75 mg tablet; Commonly known as: Plavix; Take 1 tablet (75   mg) by mouth once daily.   metoprolol tartrate 25 mg tablet; Commonly known as: Lopressor; TAKE 1   TABLET TWICE DAILY    multivitamin tablet   nitroglycerin 0.4 mg SL tablet; Commonly known as: Nitrostat; Place 1   tablet (0.4 mg) under the tongue every 5 minutes if needed for chest pain   (up to 3 doses).       Outpatient Follow-Up  Future Appointments   Date Time Provider Department Center   6/18/2024 10:00 AM Aditya Turner MD [id number] [place]   3/5/2025  1:15 PM KALPESH [id number] ECHO/VASC 4 [id number] [place]   3/12/2025  1:45 PM Mac Lomas MD [id number] [place]       Catrachito Schaeffer DO

## 2024-06-08 NOTE — PROGRESS NOTES
Respiratory Therapy Note - venous sample,  notified of results   Latest Reference Range & Units 06/08/24 03:43   POCT pH, Arterial 7.38 - 7.42 pH 7.21 (LL)   POCT pCO2, Arterial 38 - 42 mm Hg 44 (H)   POCT pO2, Arterial 85 - 95 mm Hg 32 (LL)   POCT SO2, Arterial 94 - 100 % 41 (L)   POCT Oxy Hemoglobin, Arterial 94.0 - 98.0 % 39.9 (L)   POCT Hematocrit Calculated, Arterial 41.0 - 52.0 % 34.0 (L)   POCT Sodium, Arterial 136 - 145 mmol/L 130 (L)   POCT Potassium, Arterial 3.5 - 5.3 mmol/L 4.6   POCT Chloride, Arterial 98 - 107 mmol/L 99   POCT Ionized Calcium, Arterial 1.10 - 1.33 mmol/L 1.21   POCT Glucose, Arterial 74 - 99 mg/dL 231 (H)   POCT Lactate, Arterial 0.4 - 2.0 mmol/L 9.3 (HH)   POCT Base Excess, Arterial -2.0 - 3.0 mmol/L -9.9 (L)   POCT HCO3 Calculated, Arterial 22.0 - 26.0 mmol/L 17.6 (L)   POCT Hemoglobin, Arterial 13.5 - 17.5 g/dL 11.4 (L)   POCT Anion Gap, Arterial 10 - 25 mmo/L 18   (LL): Data is critically low  (HH): Data is critically high  (H): Data is abnormally high  (L): Data is abnormally low

## 2024-06-08 NOTE — DISCHARGE SUMMARY
Discharge Diagnosis  #STEMI  #Cardiogenic Shock  #Acute Hypoxic Respiratory Failure  #ANTHONY on CKD III  #Metabolic/Lactic Acidosis  #Transaminitis    Issues Requiring Follow-Up  Cardiogenic shock  Now with LV Impella - Transfer to [facility] for further management    Test Results Pending At Discharge  Pending Labs       Order Current Status    Blood Culture Preliminary result    Blood Culture Preliminary result            Hospital Course   History of Present Illness:  Ranjit Zurita is a 64 y.o. year old male patient with significant PMH listed below including CAD with prior PCI and JOE (2017, 12/2023) on DAPT, carotid artery atherosclerosis s/p carotid artery stent with in-stent restenosis s/p successful balloon angioplasty, recurrent NSTEMI, HTN, HLD, PAD, TIA, CKD 3, NETTE, presented to [facility] emergency department 6/8 with complaints of shortness of breath since Thursday 6/6.     Patient states that on Thursday 6/6 he had a colonoscopy performed.  He then he back pain and chest pain with radiation to both shoulders.  This lasted about 24 hours and then subsided, which is when he had sudden increasing shortness of breath.  He has not been taking his Plavix due to the planned colonoscopy.  He said the chest pain was different than when he had his prior MI.  He denies any fevers or illnesses.  Denies any current chest pain or palpitations.  Denies any nausea or vomiting.  Complains of decreased intake and decreased urine output.  Continues to smoke cigarettes daily.     ED Course  On arrival to ED patient was diaphoretic.  EKG showed what was thought to be a wide-complex tachycardia and he was complaining of shortness of breath, therefore he was giving Versed and cardioverted 200 J without any change in heart rate or rhythm.  He was afebrile, heart rate 130, /69 with MAP 93, oxygen saturation 97% but he had significant work of breathing and respiratory distress therefore was placed on NIMV with BiPAP.  Laboratory  workup revealed leukocytosis with WBC 27.7.  Chemistry showed ANTHONY with creatinine 2.0, bicarb 15.  BNP 1362.  VBG showed metabolic/lactic acidosis with pH 7.10, pCO2 45, lactate 13, HCO3 14.0.  Troponin elevated to 102,665.  EKG showed ST elevations in leads I, aVL, aVR, V1 1 through V6 with reciprocal depressions in leads II, III, aVF.  Per ED provider documentation he did a bedside ultrasound which showed normal LVEF without wall motion abnormalities.  A STEMI alert was called but given POCUS findings and respiratory status patient was not taken to the Cath Lab.  He was given IV fluid and broad-spectrum antibiotics as it was thought that his troponin elevation and acidosis may have been metabolic/infectious in nature.  He was also given amiodarone bolus and infusion without improvement of heart rate or rhythm.  CT PE study was done which was negative for pulmonary embolism, showed opacity in right upper lobe and right mainstem bronchus concerning for possible aspiration and possible pneumonia, as well as bilateral groundglass opacities concerning for edema.  He was continued on BiPAP and admitted to the ICU for further care.     Interval ICU Events:  6/8: Upon morning assessment patient awake, alert, orient x 3.  Significant respiratory distress on BiPAP 40% FiO2 with respiratory rate 40.  Remains tachycardic with heart rate 120.  Normotensive.  EKG continues to show anterolateral changes concerning for acute myocardial infarction.  Troponin 106,000.  Bedside POCUS done which shows severely depressed EF, global hypokinesis with minimal septal wall motion, concerning for large infarction.  There is current concern that patient has cardiogenic shock, with continued elevated lactate at 3.3, acute kidney injury, elevated LFTs, metabolic acidosis.  Due to respiratory distress decision was made to intubate patient placed on mechanical ventilation.  Given 324 mg aspirin, 180 mg Brilinta. Emergently taken to Cath Lab with  Dr. Hyman.    RHC:  -- Severely reduced LV function < 20%  -- PCWP 34, PA 51/35 (42), RV 48/15 (16), RA 14, elevated LVEDP 38  -- , CI 2.42    LHC:   -- Patent stents of the RCA  -- In-stent restenosis with occlusion of proximal LAD -suspected to have been occluded >24 hours, however given patient's depressed EF and hemodynamic instability decision was made to intervene  -- Patient received 1x JOE to LAD    Left Ventricular Impella CP was placed.  Patient brief cardiac arrest lasting <1-minute during Impella placement which recovered without the need for vasoactive medication administration.  Patient returned to the ICU postintervention with Impella CP in place in right femoral artery - on on support P9, hemodynamically stable without the need for vasoactive medications.  Right lower extremity cool, but able to Doppler DP pulse, site marked.  On propofol infusion, heparin infusion, Eptifibatide infusion. Critical care flight team and route for transfer to [facility] CICU for further management of cardiogenic shock.    Pertinent Physical Exam At Time of Discharge  Constitutional:       Comments: Critically ill, intubated   HENT:      Head: Normocephalic and atraumatic.      Mouth/Throat:      Comments: ETT in place  Eyes:      General: No scleral icterus.     Extraocular Movements: Extraocular movements intact.      Pupils: Pupils are equal, round, and reactive to light.   Cardiovascular:      Rate and Rhythm: Regular rhythm. Tachycardia present.      Pulses: RLE cool 2/2 Impella placement - DP doppler pulse present     Heart sounds: Normal heart sounds. No murmur heard.     No friction rub. No gallop.   Pulmonary:      Comments: Intubated, mechanically ventilated  Coarse lung sounds bilaterally  Abdominal:      General: There is no distension.      Palpations: Abdomen is soft.      Tenderness: There is no abdominal tenderness.   Musculoskeletal:         General: Normal range of motion.      Cervical back: Normal  range of motion and neck supple.      Right lower leg: No edema.      Left lower leg: No edema.   Skin:     General: Skin is warm and dry.      Capillary Refill: Capillary refill takes 2 to 3 seconds.      Coloration: Skin is pale.   Neurological:      General: No focal deficit present.      Mental Status: He is oriented to person, place, and time. Mental status is at baseline.      Comments: Prior to intubation, patient with no focal deficits, oriented x 3     Home Medications     Medication List      ASK your doctor about these medications     amLODIPine 10 mg tablet; Commonly known as: Norvasc; Take 0.5 tablets (5   mg) by mouth once daily.   aspirin 81 mg EC tablet   atorvastatin 40 mg tablet; Commonly known as: Lipitor; TAKE 1 TABLET BY   MOUTH EVERY DAY   clopidogrel 75 mg tablet; Commonly known as: Plavix; Take 1 tablet (75   mg) by mouth once daily.   metoprolol tartrate 25 mg tablet; Commonly known as: Lopressor; TAKE 1   TABLET TWICE DAILY   multivitamin tablet   nitroglycerin 0.4 mg SL tablet; Commonly known as: Nitrostat; Place 1   tablet (0.4 mg) under the tongue every 5 minutes if needed for chest pain   (up to 3 doses).       Outpatient Follow-Up  Future Appointments   Date Time Provider Department Center   6/18/2024 10:00 AM Aditya Turner MD [id number] [place]   3/5/2025  1:15 PM KALPESH [id number] ECHO/VASC 4 [id number] [place]   3/12/2025  1:45 PM Mac Lomas MD [id number] [place]       Jaden Way APRN-CNP  Pulmonary & Critical Care Medicine  [facility]

## 2024-06-08 NOTE — POST-PROCEDURE NOTE
Physician Transition of Care Summary  Invasive Cardiovascular Lab    Procedure Date: 6/8/2024  Attending:    * Blaise Hyman - Primary  Resident/Fellow/Other Assistant: Surgeons and Role:  * No surgeons found with a matching role *    Indications:   * No Diagnosis Codes entered *    Post-procedure diagnosis:   * No Diagnosis Codes entered *    Procedure(s):   Impella Insertion  28845 - [place] INSJ PERQ VAD W/RS&I L HRT ARTERIAL ACCESS ONLY    Left And Right Heart Cath, With LV  32460 - [place] R & L HRT CATH W/NJX L VENTRICULOG IMG S&I    PCI  51672 - [place] PRQ TRLUML CORONARY ANGIOPLASTY ONE ART/BRANCH    Thrombectomy - Coronary  10550 - [place] PRQ TRANSLUMINAL CORONARY MECHANICL THROMBECTOMY    IVUS - Coronary  48676 - [place] ENDOLUMINAL CORONARY IVUS OCT I&R INITIAL VESSEL        Procedure Findings:   STEMI   Cardiogenic Shock  Mechanical circulatory support - Impella  [place] belkys  Primary PCI  IVUS    Description of the Procedure:     STEMI  Cardiogenic shock    Patient 63yo diabetic male with prior medical history for multiple PCIs with stent to LM (2017) and RCA (12/2023). Patient stopped his ASA and Clopidogrel to colonoscopy on 06/06/2024. After the procedure, patient complaining of shortness of breath for > 24 hours, but no chest pain. Patient also with poor diuresis during the day. Due to worsening respiratory symptoms over the past 24 hours, he was brought to ED [facility] for further evaluation. BP ~ 110/70mmHg, HR ~140bpm. Initial EKG showed tachycardia with wide complex with old RBBB but with new ST elevations in anterior and lateral walls. Patient was initially cardioverted per ER team. Initial labs showing Lactate 14, pH 7.1, Crea 2. Initial troponin ~103,000. Patient was seem at bedside, denied chest pain and felt better with BIPAP. Due to symptoms >24 hours, elevated troponin consistent with anterior STEMI with late presentation to ER (symptoms > 24h) and clinical decompensation, after discussion with ED  team, strategy was for clinical stabilization in the ICU with subsequent right and left heart catheterization with mechanical circulatory support and possible PCI.     In the ICU, patient with improvement in labs, especially Lactate, but still with shortness of breath. POCUS showing LVEF < 10%. Subsequent trend Troponin ~106,000.     After discussion of the case with the ICU team and with the heart team, we opted for intubation, mechanical ventilation and right and left heart catheterization with mechanical circulatory support. Patient was started on Norepinephrine, with was discontinued in the cath lab with improvement of clinical status.    Patient underwent right and left heart catheterization showing:  Severely reduced LV systolic function <20%  Elevated LVeDP ~38mmHg  Elevated right and left heart filling pressures  PWP 34,  PA 51/35 (42), RV 48/15 (16), RA 14mmHg; LVeDP 38mmHg.  CO 4.94, CI 2.42    Right coronary artery system dominance  Severely reduced LV systolic function  Patent stents to RCA  In-stent restenosis with occlusion of prox LAD    [place] Ti placed.    S/P successful mechanical circulatory support with Impella CP placement.     Patient had cardiac arrest for < 1 min during Impella placement, recovered well afterwards with no need for vasoactive drugs.    Shock team was activated. Discussion of Primary PCI to LAD versus conservative management. After thorough discussion, it was decided that both strategies would be reasonable. Salvage PCI with delayed presentation would be questionable, however also helpful and reasonable once it could help to improve the LV function in the long run. Therefore we opted to Primary PCI to LAD and to transfer the patient to CICU at [facility] afterwards.    S/P Successful Primary PCI to prox-mid LAD using aspiration atherectomy and one drug-eluting stent 2.75/22mm (post-dil 3.0mm NC balloon), guided by IVUS. Successful POBA to 1st Dg.    Patient remained stable during  the procedure. No complications.    ASA and Brilinta. Eptifibatide bolus and IV drip. Would suggest to keep DAPT with ASA and Brilinta for at least 12 months.    Impella and [place] Ti sutured in place.    Patient sent back to ICU and waiting to be transferred to [facility].      Complications:   No    Stents/Implants:   Implants       Stent    Stent, Anne Marie Dunn Boby, 2.75 X 22rx - [id number] - Implanted        Inventory item: STENT, ANNE MARIE FRONTIER BOBY, 2.75 X 22RX Model/Cat number: [id number]    : [employer] Lot number: [id number]      As of 6/8/2024       Status: Implanted                      Other Cardiac Implant    Pump Set, Heart, Impella Cp - [id number] - [id number] - Implanted        Inventory item: PUMP SET, HEART, IMPELLA CP Model/Cat number: [id number]    Serial number: [id number] : [employer]    Lot number: [id number] Device identifier: [id number]      As of 6/8/2024       Status: Implanted                              Anticoagulation/Antiplatelet Plan:   ASA and Brilinta. Eptifibatide bolus and IV drip. Would suggest to keep DAPT with ASA and Brilinta for at least 12 months.    Estimated Blood Loss:   0 mL    Anesthesia: Moderate Sedation Anesthesia Staff: No anesthesia staff entered.    Any Specimen(s) Removed:   No specimens collected during this procedure.    Disposition:   ICU / Transfer to [facility]    Electronically signed by: Blaise Hyman MD, 6/8/2024 11:55 AM

## 2024-06-08 NOTE — H&P
"Subjective     Chief concern:   Chief Complaint   Patient presents with    Shortness of Breath     X2 days, increased this evening, speaking in 1-2 word sentences with expiratory wheezes, and sweating.       History of present illness:  Ranjit Zurita is a 64 y.o. male presenting with PMHx of CAD c/b MI (PCI 12/2023 JOE RCA, 10/2017 JOE pLAD), carotid atherosclerosis s/p stenting & balloon angioplasty 2013, PVD s/p iliac stent 2014, DLD, HTN, remote CVA (documented unable to find date) who presented to [facility] on 6/8/24 early AM with c/f SOB. Found to have troponins >100k, STEMI iso stopping ASA/Plavix for 7 days prior to 6/7/24 colonoscopy. Patient taken emergently to Cath lab, found to have instent thrombosis of LAD with Shock call recommending revascularization although c/f late presentation of likely thrombosis occurrence ~24 hrs prior. Patient transferred post LAD revascularization to [facility] CICU on 6/8/24 with empella in place and mechanically ventilated.    History obtained from chart review and significant other Marlen Allen via phone [[phone]].     Patient had colonoscopy Thursday 6/6/24 and had been cleared by Dr. Lomas, cardiologist, on 5/8/24 office visit to hold ASA & plavix seven days prior to procedure given need for biopsy and polyp removal. Patient had colonoscopy on Thursday and then post procedure was having significant nausea and vomiting once back home. Friday he reportedly slept most of the day and then went to bed around 9PM, per s.o. Marlen reportedly had no SOB, CP when going to bed. Then around 2AM Saturday 6/8/24 he came downstairs and was \"stumbling\" and looked overall unwell and was having difficulty breathing complaining of SOB. Significant other was concerned he may be having another heart attack given his history but patient did not believe this was the case and thought he had pneumonia from all the vomiting and procedure. They called 911 and patient was taken to " "[place].     Once at [facility] found to have initial troponin 102K -> 106K with STEMI ST elevations in V1, V2, II, III, aVF, VBG pH 7.10, lactate 13.1, WBC 27.7, Cr 2.0 (bl 1.3). Loaded with ASA & brilinta. Taken to the ICU and resuscitated prior to emergent cardiac cath. RHC with  PCWP 34, PA 51/35 (42), RV 48/15 (16), RA 14, elevated LVEDP 38 , CI 2.42 followed by [facility] with patent stents to RCA and instent thrombosis of pLAD suspected to have been occluded >24 hrs, LV impella was placed with brief <1 minute asystole cardiac arrest, patient intubated and sedated. SHOCK call advised revascularization recommended and JOE x1 to LAD which was successful. Patient transferred to [facility] CICU on heparin, Integrilin,  norepinephrine 0.1, propofol drips.     Current Vitals:  BP 98/80 (BP Location: Right arm, Patient Position: Lying)   Pulse (!) 113   Temp 37 °C (98.6 °F) (Temporal)   Resp 22   Ht 1.829 m (6' 0.01\")   Wt 82.2 kg (181 lb 3.5 oz)   SpO2 100%   BMI 24.57 kg/m²    Labs:   CBC: WBC 27.6 , HGB 10.6,   BMP: , K 5.1, Cl 99, HCO3 26, BUN 27, CR 1.52, Glu 151  LFTS:  , , ALKPHOS 78 , TBILI 0.9 , DBILI 0.2  TROP: >125,000  BNP: 1,362  COAGS: PT 16.1 , PTT >200  , INR 1.4  UA:   Results from last 7 days   Lab Units 06/08/24  1243   COLOR U  Red*   PH U  6.0   SPEC GRAV UR  1.010   PROTEIN U mg/dL 100 (2+)*   BLOOD UR  LARGE (3+)*   NITRITE U  NEGATIVE   WBC UR /HPF >50*     ABG:   Results from last 7 days   Lab Units 06/08/24  1217 06/08/24  0812 06/08/24  0343   POCT PH, ARTERIAL pH 7.27* 7.19* 7.21*   POCT PCO2, ARTERIAL mm Hg 57* 59* 44*   POCT PO2, ARTERIAL mm Hg 154* 73* 32*   POCT HCO3 CALCULATED, ARTERIAL mmol/L 26.2* 22.5 17.6*   POCT BASE EXCESS, ARTERIAL mmol/L -1.4 -6.2* -9.9*    CALCIUM 8.2 MAG No results found for requested labs within last 365 days. ALB 3.5 LACTATE 1.5 PHOS No results found for requested labs within last 365 days. COVIDNo results found for " requested labs within last 365 days.    Imaging:  CTA CAP 6/8/24  1.   No evidence of aortic aneurysm or dissection.      2.   2 cm soft tissue nodular mass with punctate calcification in the right lower abdomen/pelvis abutting a bowel loop with associated tethering of the mesentery. The finding is highly concerning for the carcinoid tumor, and further evaluation and workup is recommended.      3.  1.6 cm soft tissue nodular lesion superior to the right kidney, enlarged lymph node or benign or malignant etiologies are not excluded and comparison with available outside prior imaging recommended and attention on progress imaging.      4.  Underdistention versus mild wall thickening of the transverse, descending and sigmoid colon which may be infectious or inflammatory in etiology.      5.   Asymmetric atrophy of the right kidney.      6.   Opacity in right mainstem bronchus compatible with aspirated content. There is also evidence of asymmetric right bronchial wall thickening. Opacity in the posterior right upper lobe concerning for pneumonia. Groundglass opacities and bilateral septal thickening and small pleural effusions compatible with edema.      7.   6 mm right lower lobe pulmonary nodule; attention on progress imaging recommended for further evaluation.      8.   Esophageal wall thickening may be secondary to esophagitis, however follow-up upper endoscopy recommended to exclude other underlying esophageal pathology.    Cardiac Hx:  Last echo:   12/5/2023  CONCLUSIONS:   1. Left ventricular systolic function is normal with a 60% estimated ejection fraction.   2. There is no evidence of mitral valve stenosis.   3. Trace mitral valve regurgitation.   4. Trace tricuspid regurgitation is visualized.   5. Aortic valve stenosis is not present.   6. The main pulmonary artery is normal in size, and position, with normal bifurcation into the left and right pulmonary arteries.    Last cath:   6/8/2024  CONCLUSIONS:   1.  STEMI with delay presentation to ER.   2. Cardiogenic Shock.   3. Right coronary artery system dominance.   4. Severely reduced LV systolic function <20%.   5. Patent stents to RCA.   6. In-stent restenosis with occlusion to prox LAD.   7. [place] Ti placement: PWP 34, PA 51/35 (42), RV 48/15 (16), RA 14mmHg; LVeDP 38mmHg. CO 4.94, CI 2.42.   8. S/P successful mechanical circulatory support with Impella CP placement.   9. S/P Successful Salvage Primary PCI to prox-mid LAD using aspiration atherectomy and one drug-eluting stent 2.75/22mm (post-dil 3.0mm NC balloon), guided by IVUS. Successful POBA to 1st Dg.    Colonoscopy   6/6/2024  Adenomatous-appearing mass (traversable) measuring 60 cm x 50 cm in the ileocecal valve and cecum, covering one third of the circumference; electronic chromoendoscopy (NBI) was used. The mass was extending 1 cm into Terminal ileum. EMR attempted but unable to lift. EMR was aborted.  Two sessile polyps measuring from 10 mm up to 12 mm in the transverse colon; removed en bloc by EMR and retrieved specimen. Clear-cut demarcation of the lesion was performed prior to procedure. EMR was performed via cap-assisted technique with a hot snare. Post-procedure bleeding was visualized; placed 1 clip successfully (clip is MRI conditional)  Three sessile, semi-pedunculated polyps measuring from 10 mm up to 30 mm in the rectum; performed hot snare with complete en bloc removal and retrieved specimen; completely removed target lesion en bloc by EMR and retrieved specimen. Clear-cut demarcation of the lesion was performed prior to procedure. EMR was performed via cap-assisted technique with a hot snare. Post-procedure bleeding was visualized; placed 2 clips successfully (clips are MRI conditional); hemostasis achieved      12/7/2023  The terminal ileum appeared normal.;  Polypoid mass (traversable) in the ileocecal valve; no bleeding was identified; performed cold forceps biopsy  The mass appeared  villous and soft, and appeared on the backside of the ICV.  It was 40 mm.  8-10 biopsies taken.  Multiple small and large, extensive pancolonic diverticula; no bleeding was identified  Six sessile polyps measuring from 5 mm up to 10 mm in the transverse colon, descending colon and rectosigmoid  One 15 mm pedunculated polyp in the rectosigmoid 15 cm from the anal verge; performed cold forceps biopsy  None of the polyps were remove because the patient is currently taking DAPT.  Internal hemorrhoids observed during retroflexion; no bleeding was identified  EGD   12/7/2023  The 2nd part of the duodenum appeared normal.  Mild, patchy erythematous mucosa in the duodenal bulb;  Mild, patchy abnormal mucosa with erosion in the fundus of the stomach, body of the stomach and antrum;  Performed forceps biopsies in the body of the stomach, incisura and antrum to rule out H. pylori  Small hiatal hernia  Non-obstructing Schatzki ring in the GE junction  Single 5 mm x 7 mm mucosal nodule was visualized in the GE junction; performed cold forceps biopsy      Past Medical History:    He has a past medical history of Adenomatous polyp of ascending colon (03/12/2024), Atherosclerotic heart disease of native coronary artery without angina pectoris, CAD S/P percutaneous coronary angioplasty (10/13/2023), Carotid atherosclerosis (10/13/2023), CKD (chronic kidney disease), stage III (Multi), Encounter for screening for malignant neoplasm of colon, History of transient ischemic attack (10/13/2023), Hyperlipidemia, Hypertension, Intermittent claudication (CMS-HCC) (10/13/2023), Iron deficiency anemia due to chronic blood loss (03/12/2024), Neuralgia and neuritis, unspecified, PAD (peripheral artery disease) (CMS-HCC) (10/13/2023), and Subclavian artery stenosis, left (CMS-HCC) (05/08/2024).    Past Surgical History:  He has a past surgical history that includes Other surgical history (05/28/2019); Cardiac catheterization (N/A, 12/05/2023);  and Cardiac catheterization (N/A, 12/08/2023).     Social history:  Alcohol:   Alcohol Use: Patient Unable To Answer (6/8/2024)    AUDIT-C     Frequency of Alcohol Consumption: Patient unable to answer     Average Number of Drinks: Patient unable to answer     Frequency of Binge Drinking: Patient unable to answer     Tobacco:   Tobacco Use: High Risk (6/8/2024)    Patient History     Smoking Tobacco Use: Every Day     Smokeless Tobacco Use: Never     Passive Exposure: Not on file      Illicit Drugs:   Social History     Substance and Sexual Activity   Drug Use Never       He reports that he has been smoking cigarettes. He has never used smokeless tobacco. He reports that he does not currently use alcohol. He reports that he does not use drugs.    Family history:  Family History   Problem Relation Name Age of Onset    Breast cancer Mother      Hypertension Father      Heart failure Father          Allergies:  Cyclobenzaprine and Losartan    Home medications:  Prior to Admission medications    Medication Sig Start Date End Date Taking? Authorizing Provider   amLODIPine (Norvasc) 10 mg tablet Take 0.5 tablets (5 mg) by mouth once daily. 12/8/23  Yes Candace Kendrick APRN-CNP   aspirin 81 mg EC tablet Take 1 tablet (81 mg) by mouth once daily. 1/25/22  Yes Historical Provider, MD   atorvastatin (Lipitor) 40 mg tablet TAKE 1 TABLET BY MOUTH EVERY DAY 1/11/24  Yes Mac Lomas MD   clopidogrel (Plavix) 75 mg tablet Take 1 tablet (75 mg) by mouth once daily. 3/13/24  Yes Mac Lomas MD   metoprolol tartrate (Lopressor) 25 mg tablet TAKE 1 TABLET TWICE DAILY 1/11/24  Yes Mac Lomas MD   multivitamin tablet Take 1 tablet by mouth once daily. 1/25/22   Historical Provider, MD   nitroglycerin (Nitrostat) 0.4 mg SL tablet Place 1 tablet (0.4 mg) under the tongue every 5 minutes if needed for chest pain (up to 3 doses). 12/27/23   Mac Lomas MD          Objective   Physical  "Exam  Constitutional:       Comments: Intubated and sedated    HENT:      Mouth/Throat:      Mouth: Mucous membranes are dry.      Pharynx: Oropharynx is clear.   Eyes:      General: No scleral icterus.     Pupils: Pupils are equal, round, and reactive to light.   Cardiovascular:      Rate and Rhythm: Normal rate and regular rhythm.      Pulses: Normal pulses.   Pulmonary:      Comments: Mechanically ventilated, breath sounds heard  Abdominal:      General: Abdomen is flat. Bowel sounds are normal. There is no distension.      Palpations: Abdomen is soft.   Genitourinary:     Comments: Mckeon catheter in place with cherry red urine  Musculoskeletal:      Cervical back: Neck supple.      Right lower leg: No edema.      Left lower leg: No edema.   Skin:     General: Skin is warm and dry.   Neurological:      Comments: Sedated, cough, gag, corneal intact       Vital signs:  Blood pressure 98/80, pulse (!) 113, temperature 37 °C (98.6 °F), temperature source Temporal, resp. rate 22, height 1.829 m (6' 0.01\"), weight 82.2 kg (181 lb 3.5 oz), SpO2 100%.      Relevant Results        Micro/culture data:  No results found for the last 90 days.       Assessment/Plan     Ranjit Zurita is a 64 y.o. male with PMHx of CAD c/b MI (PCI 12/2023 JOE RCA, 10/2017 JOE pLAD), carotid atherosclerosis s/p stenting & balloon angioplasty 2013, PVD s/p iliac stent 2014, DLD, HTN, remote CVA (documented unable to find date) who presented to [facility] on 6/8[facility]24 early AM with c/f SOB. Found to have troponins >100k, STEMI iso stopping ASA/Plavix for 7 days prior to 6/7/24 colonoscopy. Patient taken emergently to Cath lab, found to have instent thrombosis of LAD with Shock call recommending revascularization although c/f late presentation of likely thrombosis occurrence ~24 hrs prior now s/p pLAD JOE x1. Patient transferred post LAD revascularization to [facility] CICU on 6/8/24 with empella in place and mechanically " ventilated.    Neuro:  #Sedation   -Daily sedation holidays   -titrate sedation RASS 0 to -2     Cardiovascular:  #Cardiogenic shock   #STEMI s/p JOE pLAD 6/8/24  #Restent thrombosis   #CAD s/p multiple PCIs   ::RHC 6/8/24 PCWP 34, PA 51/35 (42), RV 48/15 (16), RA 14, elevated LVEDP 38 , CI 2.42   ::[facility] 6/8/24 patent stents to RCA and instent thrombosis of pLAD suspected to have been occluded >24 hr   -maintain LV empella   -continue integrilin ggt x12 hrs   -continue heparin ggt while on impella   -ASA 81mg daily   -Brilinta 90mg BID   -Atorvastatin 80mg daily   -maintain MAPs 60-70    #HTN   -hold home amlodipine 5mg daily       Respiratory:  #AHRF  #Aspiration Pneumonia   ::Recurrent vomiting s/p colonoscopy   ::CT 6/8/24 Opacity in right mainstem bronchus compatible with aspirated content. Opacity in the posterior right upper lobe concerning for pneumonia. Groundglass opacities and bilateral septal thickening and small pleural effusions compatible with edema.  ::MRSA nares negative 6/8/24 (no need for MRSA coverage)   -continue zosyn   -blood cx pending   -urine cx pending   -sputum culture ordered   -wean O2 goal SPO2 >92  -PRN ABG       #Pulmonary nodule   ::6 mm right lower lobe pulmonary nodule; attention on progress imaging recommended for further evaluation.  -follow up imaging outpatient  -recommend smoking cessation      GI:  #CT Findings   ::2 cm soft tissue nodular mass with punctate calcification in the right lower abdomen/pelvis abutting a bowel loop with associated tethering of the mesentery. The finding is highly concerning for the carcinoid tumor, and further evaluation and workup is recommended.  -Ensure outpatient GI follow up     Renal/:  #ANTHONY on CKD   ::Bl Cr 1.3; admission Cr 2.0   -avoid nephrotoxic drugs  -renally dose medications   -Strict I/Os     #Hematuria   -continue to monitor possibly iso anticoagulation and empella     Heme/Onc:  #NETTE c/f colon cancer   ::Admission labs WBC  27.2; Hgb 11.9; Plt 291  -maintain active T&S   -Goal Hgb >8 given significant cardiac disease   -needs further f/up outpatient for CRC workup     Endo:  - No active issues    ID:  #[id number]   #Aspiration Pneumonia   ::WBC 27.7 on admission   -infectious w/up pending   -MRSA nares negative 6/8/24  -continue zosyn       F: Replete PRN  E: Replete PRN  N: NPO  DVT Ppx: Heparin gtt  GI Ppx: Pantoprazole  Access/Lines: R femoral SWAN, R radial a line, tillman catheter, OG tube   Abx: Zosyn  O2: Ventilator     GI Laxative: Miralax     Code status: Full Code  Emergency contact:   Carmen (daughter) [phone]  Catrachito Zurita (son) [phone]     Leti Yates MD, PGY 3  Internal Medicine

## 2024-06-08 NOTE — Clinical Note
Angioplasty of the proximal left anterior descending lesion. Inflation 1: Pressure = 8 sandra; Duration = 15 sec. Inflation 2: Pressure = 12 sandra; Duration = 15 sec. Inflation 3: Pressure = 15 sandra; Duration = 15 sec. Inflation 4: Pressure = 12 sandra; Duration = 8 sec.

## 2024-06-08 NOTE — Clinical Note
Closure device placed in the right femoral artery. Site closed by suture and Tegaderm. Impella sutured and secured in place.

## 2024-06-08 NOTE — Clinical Note
Closure device placed in the right femoral vein. Site closed by suture. [place] catheter sutured and secured in place.

## 2024-06-08 NOTE — CONSULTS
Cardiology Consult Note      Date:   6/8/2024  Patient name:  Ranjit Zurita  Date of admission:  6/8/2024  2:34 AM  MRN:   [id number]  YOB: 1960  Time of Consult:  9:27 AM    Consulting Cardiologist: Dr. Aditya Sanchez      Primary Cardiologist:  Dr. Mac Lomas    Referring Provider: Dr. Catrachito Schaeffer      Admission Diagnosis:     NSTEMI (non-ST elevated myocardial infarction) (Multi)  Cardiogenic shock    History of Present Illness:      Ranjit Zurita is a 64 y.o.  male patient who is being at the request of Dr. Catrachito Schaeffer for inpatient consultation of  cardiogenic shock, non-STEMI .  He was admitted on 6/8/2024.  Previous [facility] and [facility] records have been reviewed in detail.      Is a 64-year-old gentleman with extensive cardiovascular history presented to the hospital with severe shortness of breath and ultimately found to be in cardiogenic shock.  Patient has markedly elevated troponins.  Apparently patient had a gastroenterological procedure and colonoscopy done a few days ago.  He was off antiplatelet drugs for few days.  He also was not eating or drinking properly since the prep for the procedure.  He came to the hospital.  EKGs were abnormal with a right bundle branch block pattern old septal infarct but suggestion of diffuse ST elevation.  Patient was brought to the ICU for stabilization and is now in the heart Cath Lab where I saw him.  Patient is now on a ventilator.  Patient is acidotic and has a high lactate.  Plans are to perform catheterization, place Impella, and likely send to [facility] shock team who Dr. Hyman who is the  in the Cath Lab has already spoken to.    Please refer to the admitting H&P done by the intensive care staff for further details in the chart.  Allergies:     Allergies   Allergen Reactions    Cyclobenzaprine Hives    Losartan Myalgia     Joint pain all over         Past Medical History:     Past Medical History:   Diagnosis  Date    Adenomatous polyp of ascending colon 03/12/2024    Atherosclerotic heart disease of native coronary artery without angina pectoris     3-vessel CAD    CAD S/P percutaneous coronary angioplasty 10/13/2023    Carotid atherosclerosis 10/13/2023    CKD (chronic kidney disease), stage III (Multi)     Encounter for screening for malignant neoplasm of colon     Encounter for screening colonoscopy    History of transient ischemic attack 10/13/2023    Hyperlipidemia     Hypertension     Intermittent claudication (CMS-HCC) 10/13/2023    Iron deficiency anemia due to chronic blood loss 03/12/2024    Neuralgia and neuritis, unspecified     Neuralgia    PAD (peripheral artery disease) (CMS-HCC) 10/13/2023    Subclavian artery stenosis, left (CMS-HCC) 05/08/2024       Past Surgical History:     Past Surgical History:   Procedure Laterality Date    CARDIAC CATHETERIZATION N/A 12/05/2023    Procedure: Left Heart Cath, With LV;  Surgeon: Mac Lomas MD;  Location: [facility] Cardiac Cath Lab;  Service: Cardiovascular;  Laterality: N/A;    CARDIAC CATHETERIZATION N/A 12/08/2023    Procedure: PCI JOE Stent- Coronary;  Surgeon: Mac Lomas MD;  Location: [facility] Cardiac Cath Lab;  Service: Cardiovascular;  Laterality: N/A;  RCA    OTHER SURGICAL HISTORY  05/28/2019    Carotid artery stent placement       Family History:     Family History   Problem Relation Name Age of Onset    Breast cancer Mother      Hypertension Father      Heart failure Father         Social History:     Social History     Tobacco Use    Smoking status: Every Day     Current packs/day: 0.25     Types: Cigarettes    Smokeless tobacco: Never    Tobacco comments:     Had smoked uped to 1 PPD, currently 5-6 a day   Vaping Use    Vaping status: Never Used   Substance Use Topics    Alcohol use: Not Currently     Comment: rare    Drug use: Never       CURRENT MEDICATIONS    aspirin, 324 mg, oral, Daily  ketamine, 150 mg, intravenous,  Once  norepinephrine in dextrose 5 %, , ,   oxygen, , inhalation, Continuous - Inhalation  [START ON 6/9/2024] pantoprazole, 40 mg, intravenous, Daily before breakfast  propofol, , ,   ticagrelor, 90 mg, oral, BID      amiodarone, 0.5-1 mg/min, Last Rate: Stopped (06/08/24 0609)  heparin, 0-4,500 Units/hr, Last Rate: 1,500 Units/hr (06/08/24 0422)  norepinephrine, 0.01-0.5 mcg/kg/min  propofol, 5-50 mcg/kg/min, Last Rate: 5 mcg/kg/min (06/08/24 0844)      Current Outpatient Medications   Medication Instructions    amLODIPine (NORVASC) 5 mg, oral, Daily    aspirin 81 mg EC tablet 1 tablet, oral, Daily    atorvastatin (LIPITOR) 40 mg, oral, Daily    clopidogrel (PLAVIX) 75 mg, oral, Daily    metoprolol tartrate (LOPRESSOR) 25 mg, oral, 2 times daily    multivitamin tablet 1 tablet, oral, Daily    nitroglycerin (NITROSTAT) 0.4 mg, sublingual, Every 5 min PRN        Review of Systems:      12 point review of systems was obtained in detail and is negative other than that detailed above.  Was unable to obtain any additional information.    Vital Signs:     Vitals:    06/08/24 0815 06/08/24 0830 06/08/24 0915 06/08/24 0918   BP: 119/86 130/81 130/87    BP Location:       Patient Position:       Pulse: (!) 122 (!) 126 (!) 124    Resp: 16 20 20    Temp:       TempSrc:       SpO2: 97% 99% 95% 95%   Weight:       Height:           Intake/Output Summary (Last 24 hours) at 6/8/2024 0927  Last data filed at 6/8/2024 0830  Gross per 24 hour   Intake 1899 ml   Output 275 ml   Net 1624 ml       Wt Readings from Last 4 Encounters:   06/08/24 82.2 kg (181 lb 3.5 oz)   06/06/24 81.6 kg (180 lb)   05/21/24 83.7 kg (184 lb 9.6 oz)   05/08/24 83.6 kg (184 lb 4.8 oz)       Physical Examination:     GENERAL APPEARANCE: Pale diaphoretic intubated in the Cath Lab  CHEST: Symmetric and non-tender.  INTEGUMENT: Skin diaphoretic, without gross excoriationis or lesions.  HEENT: No gross abnormalities of conjunctiva, teeth, gums, oral  "mucosa  NECK: Supple, no JVD, no bruit. Thyroid not palpable. Carotid upstrokes normal.  NEURO/PSHCY: Sedated on ventilator  LUNGS: Scattered rhonchi and wheezing  HEART: Regular, tachycardic, soft systolic murmur at the right lower sternal border, gallop noted, PMI laterally displaced   ABDOMEN: Soft, nontender, no palpable hepatosplenomegaly, no mases, no bruits. Abdominal aorta not noted to be enlarged.  MUSCULOSKELETAL: Ambulatory with normal tandem gait.  EXTREMITIES: Cool and diaphoretic, no clubbing or cyanois. There is no edema noted.  PERIPHERAL VASCULAR: Pulses present and equally palpable; 1+ throughout. No femoral bruits.      Lab:     CBC:   Lab Results   Component Value Date    WBC 27.7 (H) 06/08/2024    RBC 4.08 (L) 06/08/2024    HGB 11.9 (L) 06/08/2024    HCT 38.8 (L) 06/08/2024     06/08/2024        CMP:    Lab Results   Component Value Date     06/08/2024    K 4.0 06/08/2024    CL 99 06/08/2024    CO2 15 (L) 06/08/2024    BUN 27 (H) 06/08/2024    CREATININE 2.00 (H) 06/08/2024    GLUCOSE 326 (H) 06/08/2024    CALCIUM 9.1 06/08/2024       Magnesium:    Lab Results   Component Value Date    MG 2.12 06/08/2024       Lipid Profile:    Lab Results   Component Value Date    TRIG 78 06/08/2024    HDL 27.4 06/08/2024    LDLCALC 40 06/08/2024       TSH:    No results found for: \"TSH\"    BNP:   Lab Results   Component Value Date    BNP 1,362 (H) 06/08/2024        PT/INR:    Lab Results   Component Value Date    PROTIME 14.2 (H) 06/08/2024    INR 1.3 (H) 06/08/2024       HgBA1c:    No results found for: \"HGBA1C\"    BMP:  Lab Results   Component Value Date     06/08/2024    K 4.0 06/08/2024    CL 99 06/08/2024    CO2 15 (L) 06/08/2024    BUN 27 (H) 06/08/2024    CREATININE 2.00 (H) 06/08/2024       CBC:  Lab Results   Component Value Date    WBC 27.7 (H) 06/08/2024    RBC 4.08 (L) 06/08/2024    HGB 11.9 (L) 06/08/2024    HCT 38.8 (L) 06/08/2024    MCV 95 06/08/2024    MCH 29.2 06/08/2024    " MCHC 30.7 (L) 06/08/2024    RDW 13.2 06/08/2024     06/08/2024       Cardiac Enzymes:    Lab Results   Component Value Date    TROPHS 106,076 (HH) 06/08/2024    TROPHS 102,665 (HH) 06/08/2024    TROPHS 477 (HH) 12/04/2023       Hepatic Function Panel:    Lab Results   Component Value Date    ALKPHOS 79 06/08/2024     (H) 06/08/2024     (H) 06/08/2024    PROT 6.7 06/08/2024    BILITOT 0.9 06/08/2024       Diagnostic Studies:   Cardiac Cath Post Procedure Notes:  Post Procedure Diagnosis: JOE of RCA.  Blood Loss:               Estimated blood loss during the procedure was 0 mls.  Specimens Removed:        Number of specimen(s) removed: none.     ____________________________________________________________________________________  CONCLUSIONS:   1. Proximal RCA Lesion: The percent stenosis is 95%.   2. Proximal RCA Lesion: pre-dilation Resolute [place] 3.50x22: 0% residual stenosis.     ICD 10 Codes:  Non ST elevation (NSTEMI) myocardial infarction-I21.4; Old myocardial infarction-I25.2     CPT Codes:  Moderate Sedation Services 1st additional 15 minutes patient >5 years-01575; Stent w angioplasty Right Coronary single major Artery branch (PCI)-98177.     [id number] Mac Mathew MD  Performing Physician  Electronically signed by [id number] Mac Mathew MD on 12/8/2023 at 8:23:24  AM    Cardiac Cath Post Procedure Notes:  Post Procedure Diagnosis: Single vessel disease.  Blood Loss:               Estimated blood loss during the procedure was 0 mls.  Specimens Removed:        Number of specimen(s) removed: none.     ____________________________________________________________________________________  CONCLUSIONS:   1. Mid Left Main: 10% stenosis.   2. Left Anterior Descending Artery: contains patent previously placed stents.   3. Proximal and mid LAD Lesion: The percent stenosis is 30%.   4. Proximal and mid CX Lesion: The percent stenosis is 10-30%.   5. Right Coronary Artery: contains patent  previously placed stents.   6. Proximal RCA Lesion: The percent stenosis is 90%.   7. Prox PLVB RCA Lesion: The percent stenosis is 50 %.   8. The Left Ventricular Ejection Fraction is 75%.     ICD 10 Codes:  Non ST elevation (NSTEMI) myocardial infarction-I21.4     CPT Codes:  Left Heart Cath (visualization of coronaries) and LV-65347; Moderate Sedation Services 1st additional 15 minutes patient >5 years-84971     [id number] Mac Mathew MD  Performing Physician  Electronically signed by [id number] Mac Mathew MD on 12/5/2023 at 12:17:02  PM       XR chest 1 view    Result Date: 6/8/2024  Interpreted By:  Garrett Bruce, STUDY: XR CHEST 1 VIEW;  6/8/2024 9:05 am   INDICATION: Signs/Symptoms:Intubation.   COMPARISON: 06/08/2024   ACCESSION NUMBER(S): [id number]   ORDERING CLINICIAN: JOSE F BELL   FINDINGS: AP portable view of the chest is obtained.  Limited exam due to portable nature. Magnified cardiac silhouette. Mild interstitial prominence diffusely. Apparent hyperinflation. NG tube in place with the tip not included in the images..       1. Apparent hyperinflation. Mild interstitial prominence diffusely.       MACRO: None   Signed by: Garrett Bruce 6/8/2024 9:12 AM Dictation workstation:   [id number]    Point of Care Ultrasound    Result Date: 6/8/2024  ARUN Peng-KELSI     6/8/2024  8:13 AM Point of Care Ultrasound Note Indication: STEMI; Suspected Cardiogenic Shock LV Function: Severely depressed, likely ~ 10% LVEF. Global hypokinesis noted with minimal septal wall motion. LA: Mildly Dilated RV Function: Appears normal to slightly depressed IVC: Dilated, non-collapsible Lungs: B-Lines present indicative of pulmonary edema Impression: Patient presenting with shortness of breath, troponin elevation >100,000, ST elevations in leads I, aVL, aVR, V1 through V6 with reciprocal depressions in leads II, III, aVF.  Bedside POCUS with depressed EF of approximately 10%, global hypokinesis, septal  wall appears to be akinetic.  Patient had recent PCI 12/8/23 with JOE placed to RCA, was placed on aspirin and Plavix.  Plavix recently held for colonoscopy 6/6. In the setting of the above with POCUS findings of depressed LVEF with global wall motion abnormalities suspect that patient had large ischemic event now with cardiogenic shock. Plan: Intubated, mechanically ventilated for respiratory distress. Norepinpehrine for hemodynamic support. Spoke with interventional cardiologist Dr. Perrin - Plan for cath lab for ischemic evaluation, possible PCI, and mechanical circulatory support if warranted. Return to ICU vs Transfer to [facility] for higher level of care pending [facility]. Jaden Way, APRN-CNP Pulmonary & Critical Care Medicine [facility]     CT angio chest abdomen pelvis    Result Date: 6/8/2024  Interpreted By:  Jaime Hickman, STUDY: CT ANGIO CHEST ABDOMEN PELVIS;  6/8/2024 3:18 am   INDICATION: Signs/Symptoms:Tachycardia, respiratory failure, severe metabolic acidosis, abdominal pain, colonoscopy yesterday, concern for abdominal catastrophe or pulmonary edema or PE.   COMPARISON: None.   ACCESSION NUMBER(S): [id number]   ORDERING CLINICIAN: CALOS SHARIF   TECHNIQUE: Contiguous axial images of the chest, abdomen and pelvis were obtained after the intravenous administration of  contrast. Coronal and sagittal reformatted images were obtained from the axial images. MIPS and 3D reformatted images were also performed and reviewed.   FINDINGS: CT CHEST:   The examination is limited secondary to motion.   No axillary, mediastinal, hilar lymphadenopathy.   The heart is normal in size. Coronary artery atherosclerotic calcifications. No significant pericardial effusion. No evidence of aortic aneurysm or dissection. The ascending aorta measures 3.2 cm in diameter.   Opacity right mainstem bronchus compatible with aspirated content. Evaluation of the lungs is limited secondary motion. There is pulmonary  emphysema. There is mild opacity in the right upper lobe posteriorly 6 mm nodular opacity right lower lobe. There is bilateral septal thickening And small pleural effusions compatible with edema. Bibasilar atelectasis. No pneumothorax.   Mild esophageal wall thickening.   Multilevel degenerative change of the thoracic spine.   CT ABDOMEN PELVIS:   Evaluation the abdomen and pelvis is limited secondary to motion.   No evidence of liver mass. The gallbladder is present. No dilatation of the common bile duct.   The pancreas, spleen and adrenal glands appear unremarkable.   1.6 cm soft tissue nodular lesion superior to the right kidney. Asymmetric atrophy of the right kidney. No left hydronephrosis.   No evidence of abdominal aortic aneurysm or dissection. Atherosclerotic calcification of the aorta and abdominal and pelvic vasculature. The celiac artery is patent. There is atherosclerotic disease of the superior mesenteric artery which is however patent. There is atherosclerotic disease and stenosis of the right renal artery. The left renal artery is patent. There is atherosclerotic disease abdomen of caliber of the inferior mesenteric artery.   There is a 2 cm soft tissue nodular mass with punctate calcification in the right lower abdomen/pelvis abutting a bowel loop with tethering of the mesentery.   No evidence of bowel obstruction. Surgical clips in the distal transverse colon and in the distal sigmoid colon/rectum. There is underdistention versus wall thickening of the transverse, descending sigmoid colon.   Urinary bladder is underdistended and not well evaluated.   Prostatomegaly.   No acute fracture of the lumbar spine.       1.   No evidence of aortic aneurysm or dissection.   2.   2 cm soft tissue nodular mass with punctate calcification in the right lower abdomen/pelvis abutting a bowel loop with associated tethering of the mesentery. The finding is highly concerning for the carcinoid tumor, and further  evaluation and workup is recommended.   3.  1.6 cm soft tissue nodular lesion superior to the right kidney, enlarged lymph node or benign or malignant etiologies are not excluded and comparison with available outside prior imaging recommended and attention on progress imaging.   4.  Underdistention versus mild wall thickening of the transverse, descending and sigmoid colon which may be infectious or inflammatory in etiology.   5.   Asymmetric atrophy of the right kidney.   6.   Opacity in right mainstem bronchus compatible with aspirated content. There is also evidence of asymmetric right bronchial wall thickening. Opacity in the posterior right upper lobe concerning for pneumonia. Groundglass opacities and bilateral septal thickening and small pleural effusions compatible with edema.   7.   6 mm right lower lobe pulmonary nodule; attention on progress imaging recommended for further evaluation.   8.   Esophageal wall thickening may be secondary to esophagitis, however follow-up upper endoscopy recommended to exclude other underlying esophageal pathology.   MACRO: None   Signed by: Jaime Hickman 6/8/2024 4:04 AM Dictation workstation:   [id number]    XR chest 1 view    Result Date: 6/8/2024  Interpreted By:  Carmina Jason, STUDY: XR CHEST 1 VIEW;  6/8/2024 2:55 am   INDICATION: Signs/Symptoms:cp   COMPARISON: Chest x-ray 12/04/2023.   ACCESSION NUMBER(S): [id number]   ORDERING CLINICIAN: RUBEN SALAS   TECHNIQUE: Portable frontal view of the chest was obtained .   FINDINGS: The patient is in a lordotic position.   Monitoring wires are overlying the patient.   The cardiomediastinal silhouette is within normal limits.   There is diffuse bilateral interstitial thickening. There are ill-defined bilateral airspace opacities. There are trace bilateral pleural effusions. No pneumothorax.       1. Diffuse bilateral interstitial thickening with ill-defined bilateral airspace opacities, may be secondary to pulmonary  edema and/or pneumonia. 2. Trace bilateral pleural effusions.       MACRO: None.   Signed by: Carmina Jason 6/8/2024 3:21 AM Dictation workstation:   [id number]      Radiology:     XR chest 1 view   Final Result   1. Apparent hyperinflation. Mild interstitial prominence diffusely.                  MACRO:   None        Signed by: Garrett Bruce 6/8/2024 9:12 AM   Dictation workstation:   [id number]      Point of Care Ultrasound   Final Result      CT angio chest abdomen pelvis   Final Result   1.   No evidence of aortic aneurysm or dissection.        2.   2 cm soft tissue nodular mass with punctate calcification in the   right lower abdomen/pelvis abutting a bowel loop with associated   tethering of the mesentery. The finding is highly concerning for the   carcinoid tumor, and further evaluation and workup is recommended.        3.  1.6 cm soft tissue nodular lesion superior to the right kidney,   enlarged lymph node or benign or malignant etiologies are not   excluded and comparison with available outside prior imaging   recommended and attention on progress imaging.        4.  Underdistention versus mild wall thickening of the transverse,   descending and sigmoid colon which may be infectious or inflammatory   in etiology.        5.   Asymmetric atrophy of the right kidney.        6.   Opacity in right mainstem bronchus compatible with aspirated   content. There is also evidence of asymmetric right bronchial wall   thickening. Opacity in the posterior right upper lobe concerning for   pneumonia. Groundglass opacities and bilateral septal thickening and   small pleural effusions compatible with edema.        7.   6 mm right lower lobe pulmonary nodule; attention on progress   imaging recommended for further evaluation.        8.   Esophageal wall thickening may be secondary to esophagitis,   however follow-up upper endoscopy recommended to exclude other   underlying esophageal pathology.        MACRO:   None         Signed by: Jaime Hickman 6/8/2024 4:04 AM   Dictation workstation:   [id number]      XR chest 1 view   Final Result   1. Diffuse bilateral interstitial thickening with ill-defined   bilateral airspace opacities, may be secondary to pulmonary edema   and/or pneumonia.   2. Trace bilateral pleural effusions.                  MACRO:   None.        Signed by: Carmina Jason 6/8/2024 3:21 AM   Dictation workstation:   [id number]      Transthoracic Echo (TTE) Complete    (Results Pending)   Cardiac Catheterization Procedure    (Results Pending)       Problem List:     Patient Active Problem List   Diagnosis    Abnormal results of cardiovascular function studies    Bruising    CAD S/P percutaneous coronary angioplasty    Carotid atherosclerosis    Carotid bruit    Carotid stent occlusion (CMS-HCC)    Chest pain    Chronic kidney disease, stage 3 (Multi)    Current smoker    Dizziness    Dyslipidemia    Essential hypertension    History of CVA (cerebrovascular accident)    History of transient ischemic attack    Intermittent claudication (CMS-HCC)    Mixed hyperlipidemia    PAD (peripheral artery disease) (CMS-HCC)    Slurred speech    NSTEMI (non-ST elevated myocardial infarction) (Multi)    BMI 25.0-25.9,adult    Iron deficiency anemia due to chronic blood loss    Adenomatous polyp of ascending colon    Anemia    History of elevated lipids    History of hypertension    History of metabolic disorder    Neuralgia    Nicotine dependence, uncomplicated    Other fatigue    Pain in right shoulder    Pain of foot    Subclavian artery stenosis, left (CMS-HCC)       Assessment:         64-year-old gentleman seen and evaluated in the Cath Lab prior to emergency procedure due to cardiogenic shock.    Meds, vitals, examination as noted.    Chart reviewed in detail discussed with the staff intensive care team and cardiac interventional team.    Impression:  Cardiogenic shock with respiratory failure and congestive heart  failure  Acute myocardial infarction with marked elevation of troponin of over 100,000 and EKGs possibly suggesting diffuse anterior and anterolateral myocardial infarction  Wide-complex tachycardia  Multivessel angioplasty and stenting most recently in the fall of last year including RCA next year hypertension  Advanced COPD  Hyperlipidemia  Peripheral vascular disease  Remote TIA  Chronic dyspnea  Stage III kidney disease  Recent colonoscopy with undetermined malignancy found in the last week  Anemia  Plan:   Recommendation:  Patient is currently in the Cath Lab  Catheterization will be performed  's assist device with Impella will be placed  Patient will likely be sent to [facility] for shock team to address additional issues  Discussed in detail with Dr. Schaeffer with his attending intensivist  Supportive measures while here  Prognosis is extremely poor      Thank you for the opportunity to participate in the care of your patient.  Please do not hesitate to call if you have any questions.    Electronically signed by Aditya Sanchez DO, on 6/8/2024 at 9:27 AM

## 2024-06-08 NOTE — Clinical Note
Angioplasty of the middle left anterior descending lesion. Inflation 1: Pressure = 6 sandra; Duration = 10 sec. Inflation 2: Pressure = 8 sandra; Duration = 15 sec. Inflation 3: Pressure = 8 sandra; Duration = 10 sec.

## 2024-06-08 NOTE — SIGNIFICANT EVENT
[facility] SIGNIFICANT EVENT NOTE:    Date:  6/8/2024  Patient:  Ranjit Zurita  YOB: 1960  MRN:  [id number]   Admit Date:  6/8/2024  =============================================================================================    Patient admitted last evening after presenting to the emergency department with shortness of breath.  Has a history of coronary artery disease with previous MIs.  His Plavix has been on hold for the past 7 days in preparation for colonoscopy.  Was not taking aspirin.  Last stent was placed in December.    Concern for possible STEMI in the emergency department.  Point-of-care ultrasound performed which reportedly showed normal ejection fraction however images are not available.  Patient has been in the intensive care unit on BiPAP.  He continues to have ST elevation on his EKG which was performed this morning.  He remains metabolically deranged with an elevated lactate.  Troponin is significantly elevated above 100,000.    At bedside assessment the patient is in respiratory distress.  Using accessory muscles.  Extremities cool.  Not hypotensive, however I'm concerned for cardiogenic shock.  Bedside ultrasound (images available in PACS) revealed a significantly depressed ejection fraction.  Discussed with interventional cardiology.  Plan to take emergently to the Cath Lab.  May require an Impella or balloon pump.    Decision made to intubate the patient for safety/respiratory support for procedure.  Connected to the defibrillator for treatment of any malignant arrhythmias during induction.  Did require initiation of Levophed for hemodynamic support.  Patient consented verbally at bedside.  Intubated without incident.    Patient to be taken emergently to the Cath Lab.  Further disposition pending Lab findings.  May require transfer to tertiary care center    I have personally spent 45 minutes of critical care time, exclusive of time spent on any procedures, in  evaluation and management of this critically ill patient

## 2024-06-08 NOTE — PROGRESS NOTES
Vancomycin Dosing by Pharmacy- Cessation of Therapy    Consult to pharmacy for vancomycin dosing has been discontinued by the prescriber, pharmacy will sign off at this time.    Please call pharmacy if there are further questions or re-enter a consult if vancomycin is resumed.     Mila Hough, [facility]

## 2024-06-08 NOTE — PROCEDURES
Arterial Line Insertion    Indication: Hemodynamic Monitoring    Catheter: 20g, 5cm    Procedure  The patient was prepped and draped in the normal sterile fashion.  The ultrasound probe was draped with a sterile probe cover. Using ultrasound guidance, the Right Radial artery was identified and cannulated.  There was good pulsatile flow through the needle.  A wire was then introduced into the artery.  The needle was removed and using the seldinger technique, the catheter was advanced over the wire.  The catheter was then secured with sutures and connected to the monitor with a good arterial waveform noted.  A dressing was applied.      Complications: None      The patient tolerated the procedure well.    ARUN Peng-CNP  Pulmonary & Critical Care Medicine  [facility]

## 2024-06-08 NOTE — PROCEDURES
ENDOTRACHEAL INTUBATION     Indication: Acute Hypoxic Respiratory Failure in setting of suspected Cardiogenic Shock    Provider: CON Peng    Assistants: Dr. Laury JENSEN Medications:  150 mg Ketamine  100 mg Rocuronium    Hemodynamic Medications:  Norepinephrine Infusion    Position: Sniffing    Pre-Oxygenation: 100% BiPAP    Using a hyper-angulated S4 blade with the assistance of videolaryngoscopy, the vocal cords were able to be visualized.  A Grade 11 view was obtained.  A 7.5mm endotracheal tube was then advanced past the vocal cords without resistance. Sylet removed.  The tube was then connected to EtCO2 monitoring with positive color change.  Fogging of the tube was noted with bagging.  Bilateral breath sounds noted.  The tube was then secured at 25 cm at the lip.  Patient then connected to the ventilator without incident.     Complications: None    Post intubated CXR:  Pending    CON Peng  Pulmonary & Critical Care Medicine  [facility]

## 2024-06-08 NOTE — Clinical Note
Angioplasty of the middle left anterior descending lesion. Inflation 1: Pressure = 15 sandra; Duration = 10 sec.

## 2024-06-08 NOTE — PROGRESS NOTES
[facility] Critical Care Medicine       Date:  6/8/2024  Patient:  Ranjit Zurita  YOB: 1960  MRN:  [id number]   Admit Date:  6/8/2024  ========================================================================================================    Chief Complaint   Patient presents with    Shortness of Breath     X2 days, increased this evening, speaking in 1-2 word sentences with expiratory wheezes, and sweating.         History of Present Illness:  Ranjit Zurita is a 64 y.o. year old male patient with significant PMH listed below including CAD with prior PCI and JOE (2017, 12/2023) on DAPT, carotid artery atherosclerosis s/p carotid artery stent with in-stent restenosis s/p successful balloon angioplasty, recurrent NSTEMI, HTN, HLD, PAD, TIA, CKD 3, NETTE, presented to [facility] emergency department 6/8 with complaints of shortness of breath since Thursday 6/6.    Patient states that on Thursday 6/6 he had a colonoscopy performed.  He then he back pain and chest pain with radiation to both shoulders.  This lasted about 24 hours and then subsided, which is when he had sudden increasing shortness of breath.  He has not been taking his Plavix due to the planned colonoscopy.  He said the chest pain was different than when he had his prior MI.  He denies any fevers or illnesses.  Denies any current chest pain or palpitations.  Denies any nausea or vomiting.  Complains of decreased intake and decreased urine output.  Continues to smoke cigarettes daily.    ED Course  On arrival to ED patient was diaphoretic.  EKG showed what was thought to be a wide-complex tachycardia and he was complaining of shortness of breath, therefore he was giving Versed and cardioverted 200 J without any change in heart rate or rhythm.  He was afebrile, heart rate 130, /69 with MAP 93, oxygen saturation 97% but he had significant work of breathing and respiratory distress therefore was placed on NIMV with BiPAP.  Laboratory workup  revealed leukocytosis with WBC 27.7.  Chemistry showed ANTHONY with creatinine 2.0, bicarb 15.  BNP 1362.  VBG showed metabolic/lactic acidosis with pH 7.10, pCO2 45, lactate 13, HCO3 14.0.  Troponin elevated to 102,665.  EKG showed ST elevations in leads I, aVL, aVR, V1 1 through V6 with reciprocal depressions in leads II, III, aVF.  Per ED provider documentation he did a bedside ultrasound which showed normal LVEF without wall motion abnormalities.  A STEMI alert was called but given POCUS findings and respiratory status patient was not taken to the Cath Lab.  He was given IV fluid and broad-spectrum antibiotics as it was thought that his troponin elevation and acidosis may have been metabolic/infectious in nature.  He was also given amiodarone bolus and infusion without improvement of heart rate or rhythm.  CT PE study was done which was negative for pulmonary embolism, showed opacity in right upper lobe and right mainstem bronchus concerning for possible aspiration and possible pneumonia, as well as bilateral groundglass opacities concerning for edema.  He was continued on BiPAP and admitted to the ICU for further care.    Interval ICU Events:  6/8: Upon morning assessment patient awake, alert, orient x 3.  Significant respiratory distress on BiPAP 40% FiO2 with respiratory rate 40.  Remains tachycardic with heart rate 120.  Normotensive.  EKG continues to show anterolateral changes concerning for acute myocardial infarction.  Troponin 106,000.  Bedside POCUS done which shows severely depressed EF, global hypokinesis with minimal septal wall motion, concerning for large infarction.  There is current concern that patient has cardiogenic shock, with continued elevated lactate at 3.3, acute kidney injury, elevated LFTs, metabolic acidosis.  Due to respiratory distress decision was made to intubate patient placed on mechanical ventilation.  Given 324 mg aspirin, 180 mg Brilinta. Dr. Hyman of cath lab called -will plan  to take patient for ischemic evaluation, placement of Impella.  Will plan to transfer to [facility] for higher level of care.    Medical History:  Past Medical History:   Diagnosis Date    Adenomatous polyp of ascending colon 03/12/2024    Atherosclerotic heart disease of native coronary artery without angina pectoris     3-vessel CAD    CAD S/P percutaneous coronary angioplasty 10/13/2023    Carotid atherosclerosis 10/13/2023    CKD (chronic kidney disease), stage III (Multi)     Encounter for screening for malignant neoplasm of colon     Encounter for screening colonoscopy    History of transient ischemic attack 10/13/2023    Hyperlipidemia     Hypertension     Intermittent claudication (CMS-HCC) 10/13/2023    Iron deficiency anemia due to chronic blood loss 03/12/2024    Neuralgia and neuritis, unspecified     Neuralgia    PAD (peripheral artery disease) (CMS-HCC) 10/13/2023    Subclavian artery stenosis, left (CMS-HCC) 05/08/2024     Past Surgical History:   Procedure Laterality Date    CARDIAC CATHETERIZATION N/A 12/05/2023    Procedure: Left Heart Cath, With LV;  Surgeon: Mac Lomas MD;  Location: [facility] Cardiac Cath Lab;  Service: Cardiovascular;  Laterality: N/A;    CARDIAC CATHETERIZATION N/A 12/08/2023    Procedure: PCI JOE Stent- Coronary;  Surgeon: Mac Lomas MD;  Location: [facility] Cardiac Cath Lab;  Service: Cardiovascular;  Laterality: N/A;  RCA    OTHER SURGICAL HISTORY  05/28/2019    Carotid artery stent placement     Medications Prior to Admission   Medication Sig Dispense Refill Last Dose    amLODIPine (Norvasc) 10 mg tablet Take 0.5 tablets (5 mg) by mouth once daily. 30 tablet 5 6/7/2024    aspirin 81 mg EC tablet Take 1 tablet (81 mg) by mouth once daily.   Past Week    atorvastatin (Lipitor) 40 mg tablet TAKE 1 TABLET BY MOUTH EVERY DAY 90 tablet 3 6/7/2024    clopidogrel (Plavix) 75 mg tablet Take 1 tablet (75 mg) by mouth once daily. 90 tablet 3 Past Week    metoprolol tartrate  (Lopressor) 25 mg tablet TAKE 1 TABLET TWICE DAILY 180 tablet 3 6/7/2024    multivitamin tablet Take 1 tablet by mouth once daily.       nitroglycerin (Nitrostat) 0.4 mg SL tablet Place 1 tablet (0.4 mg) under the tongue every 5 minutes if needed for chest pain (up to 3 doses). 25 tablet 5      Cyclobenzaprine and Losartan  Social History     Tobacco Use    Smoking status: Every Day     Current packs/day: 0.25     Types: Cigarettes    Smokeless tobacco: Never    Tobacco comments:     Had smoked uped to 1 PPD, currently 5-6 a day   Vaping Use    Vaping status: Never Used   Substance Use Topics    Alcohol use: Not Currently     Comment: rare    Drug use: Never     Family History   Problem Relation Name Age of Onset    Breast cancer Mother      Hypertension Father      Heart failure Father         Hospital Medications:    amiodarone, 0.5-1 mg/min, Last Rate: Stopped (06/08/24 0609)  heparin, 0-4,500 Units/hr, Last Rate: 1,500 Units/hr (06/08/24 0422)  norepinephrine, 0.01-0.5 mcg/kg/min  propofol, 5-50 mcg/kg/min, Last Rate: 5 mcg/kg/min (06/08/24 0844)          Current Facility-Administered Medications:     amiodarone (Nexterone) 360 mg in dextrose,iso-osm 200 mL (1.8 mg/mL) infusion (premix), 0.5-1 mg/min, intravenous, Continuous, Torres Mejía PA-C, Stopped at 06/08/24 0609    aspirin chewable tablet 324 mg, 324 mg, oral, Daily, Jaden Way, APRN-CNP    heparin 25,000 Units in dextrose 5% 250 mL (100 Units/mL) infusion (premix), 0-4,500 Units/hr, intravenous, Continuous, Chris Ray MD, Last Rate: 15 mL/hr at 06/08/24 0422, 1,500 Units/hr at 06/08/24 0422    heparin bolus from bag 3,000-6,000 Units, 3,000-6,000 Units, intravenous, q4h PRN, Chris Ray MD    HYDROmorphone PF (Dilaudid) injection 0.2 mg, 0.2 mg, intravenous, q2h PRN, Catrachito Schaeffer DO    ketamine in NaCl, iso-osmotic injection 150 mg, 150 mg, intravenous, Once, Catrachito Schaeffer,     levalbuterol (Xopenex) 1.25 mg/0.5 mL  "nebulizer solution 1.25 mg, 1.25 mg, nebulization, q4h PRN, Catrachito Schaeffer DO    norepinephrine (Levophed) 8 mg in dextrose 5% 250 mL (0.032 mg/mL) infusion (premix), 0.01-0.5 mcg/kg/min, intravenous, Continuous, Catrachito Schaeffer DO    norepinephrine in dextrose 5 % (Levophed) infusion  - Omnicell Override Pull, , , ,     oxygen (O2) therapy, , inhalation, Continuous PRN - O2/gases, Marilee ROSALIA Gloverito, APRN-CNP    oxygen (O2) therapy, , inhalation, Continuous - Inhalation, Jaden Way APRSTEPHANE-CNP    [START ON 6/9/2024] pantoprazole (ProtoNix) injection 40 mg, 40 mg, intravenous, Daily before breakfast, Jaden Way APRN-CNP    propofol (Diprivan) infusion, 5-50 mcg/kg/min, intravenous, Continuous, Catrachito Schaeffer DO, Last Rate: 2.47 mL/hr at 06/08/24 0844, 5 mcg/kg/min at 06/08/24 0844    propofol (Diprivan) injection  - Omnicell Override Pull, , , ,     ticagrelor (Brilinta) tablet 180 mg, 180 mg, oral, Once **FOLLOWED BY** ticagrelor (Brilinta) tablet 90 mg, 90 mg, oral, BID, Jaden Way APRN-CNP    Review of Systems:  14 point review of systems was completed and negative except for those specially mention in my HPI    Physical Exam:    Heart Rate:  [108-145]   Temp:  [35.2 °C (95.4 °F)-35.8 °C (96.4 °F)]   Resp:  [15-40]   BP: ()/(60-87)   Height:  [182.9 cm (6')-182.9 cm (6' 0.01\")]   Weight:  [81.6 kg (179 lb 14.3 oz)-82.2 kg (181 lb 3.5 oz)]   SpO2:  [67 %-100 %]     Physical Exam  Constitutional:       Comments: Critically ill, intubated   HENT:      Head: Normocephalic and atraumatic.      Mouth/Throat:      Comments: ETT in place  Eyes:      General: No scleral icterus.     Extraocular Movements: Extraocular movements intact.      Pupils: Pupils are equal, round, and reactive to light.   Cardiovascular:      Rate and Rhythm: Regular rhythm. Tachycardia present.      Pulses: Normal pulses.      Heart sounds: Normal heart sounds. No murmur heard.     No friction rub. No gallop. "   Pulmonary:      Comments: Intubated, mechanically ventilated  Coarse lung sounds bilaterally  Abdominal:      General: There is no distension.      Palpations: Abdomen is soft.      Tenderness: There is no abdominal tenderness.   Musculoskeletal:         General: Normal range of motion.      Cervical back: Normal range of motion and neck supple.      Right lower leg: No edema.      Left lower leg: No edema.   Skin:     General: Skin is warm and dry.      Capillary Refill: Capillary refill takes 2 to 3 seconds.      Coloration: Skin is pale.   Neurological:      General: No focal deficit present.      Mental Status: He is oriented to person, place, and time. Mental status is at baseline.      Comments: Prior to intubation, patient with no focal deficits, oriented x 3         Objective:    I have reviewed all medications, laboratory results, and imaging pertinent for today's encounter.    FiO2 (%):  [40 %-70 %] 40 %  S RR:  [14] 14  MAP (cm H2O):  [7.7] 7.7      Intake/Output Summary (Last 24 hours) at 6/8/2024 0853  Last data filed at 6/8/2024 0830  Gross per 24 hour   Intake 1899 ml   Output 275 ml   Net 1624 ml         Assessment/Plan:    I am currently managing this critically ill patient for the following problems:    Neuro/Psych/Pain Ctrl/Sedation:  Mentation intact prior to intubation  -- Sedation with propofol, RASS goal -1-0  -- Dilaudid as needed for pain control    Respiratory/ENT:  #Acute Hypoxic Respiratory Failure, Respiratory Distress  Secondary to cardiogenic shock and pulmonary edema   -- Intubated 6/8  -- Cardiology workup and management as below  -- May benefit from diuresis when hemodynamically stable  -- Nebs as needed  -- On 100%, PEEP 8, wean FiO2 for SpO2 goal >92%    Cardiovascular:  #Cardiogenic Shock 2/2 STEMI  #Hx HTN, HLD, CAD with prior PCI  Prior PCI in December 2023 with JOE to RCA, recently stopped Plavix for colonoscopy, now with anterolateral ST elevations and reciprocal ST  depressions, respiratory distress, troponin >100,000  -- Bedside POCUS with severely depressed LVEF ~ 10%, global hypokinesis, volume overload  -- Continued anterolateral ST elevations and reciprocal depressions  -- Given 324 mg aspirin, 180 mg of Brilinta load  -- Heparin infusion for STEMI protocol  -- Hemodynamic support with norepinephrine for MAP goal >60  -- Plan for left heart catheterization and placement of Impella, will transfer to [facility] [place] higher level of care of cardiogenic shock    GI:  #Transaminitis  #Abnormal Abdominal Imaging  Transaminitis likely congestive hepatopathy versus ischemic injury in the setting of cardiogenic shock  -- CT A/P with concern for possible carcinoid tumor, will need outpatient GI follow-up and further imaging in the future when acute issues have resolved  -- Trend LFTs daily    Renal/Volume Status (Intra & Extravascular):  #ANTHONY on CKD III  #Metabolic/Lactic Acidosis  Arrived to ED with creatinine 2.0, lactate >14, pH 7.1, HCO3 14 secondary to cardiogenic shock and low output state  -- Given IVF resuscitation, will avoid further IV fluids as patient has slight volume overload  -- Impella and vasoactive medications to increase organ perfusion  -- High risk for ATN and may require CRRT  -- Trend lactate, BMP, renal function closely  -- [place] cathter, Strict intake and output monitoring    Endocrine  #Hyperglycemia  Possibly stress-induced  -- Check hemoglobin A1c  -- Moderate SSI every 4 hours    Infectious Disease:  #Leukocytosis  Possible pneumonia although CT chest with minimal infiltrates and appears more like groundglass opacities consistent with pulmonary edema, overall low suspicion for infection  -- Received vancomycin, azithromycin, Zosyn emergency department  -- Can obtain sputum culture, blood cultures, strep and Legionella urine antigens  -- Check procalcitonin  -- Will defer antibiotics at this time to [facility]    Heme/Onc:  #Anemia of Chronic Disease  -- Will be  anticoagulated with Impella and STEMI  -- Monitor CBC closely, transfuse for Hb goal >7    ORTHO/MSK:  -- Will need PT/OT when medically stable    Ethics/Code Status:  FULL CODE    :  DVT Prophylaxis: Heparin infusion  GI Prophylaxis: PPI  Bowel Regimen: PRN  Diet: NPO  CVC: No  [place]: Yes  [place]: Yes  Restraints: Yes  Dispo: ICU - [place] [facility]    Critical Care Time:  60 minutes spent in preparing to see patient (I.e. review of medical records), evaluation of diagnostics (I.e. labs, imaging, etc.), documentation, discussing plan of care with patient/ family/ caregiver, and/ or coordination of care with multidisciplinary team. Time does not include completion of procedure time.       ARUN Peng-CNP  Pulmonary & Critical Care Medicine  [facility]

## 2024-06-08 NOTE — Clinical Note
Vessel: LAD (mid). Stent inserted. Inflation 1: Pressure = 8 sandra; Duration = 10 sec. Inflation 2: Pressure = 8 sandra; Duration = 8 sec. Inflation 3: Pressure = 8 sandra; Duration = 12 sec.

## 2024-06-08 NOTE — CONSULTS
Vancomycin Dosing by Pharmacy- INITIAL    Ranjit Zurita is a 64 y.o. year old male who Pharmacy has been consulted for vancomycin dosing for sepsis/pneumonia. Based on the patient's indication and renal status this patient will be dosed based on a goal AUC of 400-600.     Renal function is currently stable.    Visit Vitals  BP 99/73   Pulse (!) 138   Temp 35.2 °C (95.4 °F) (Tympanic)   Resp (!) 33        Lab Results   Component Value Date    CREATININE 2.00 (H) 2024    CREATININE 1.39 (H) 2023    CREATININE 1.32 (H) 2023    CREATININE 1.27 2023        Patient weight is as follows:   Vitals:    24 0248   Weight: 81.6 kg (179 lb 14.3 oz)       Cultures:  No results found for the encounter in last 14 days.        No intake/output data recorded.  I/O during current shift:  No intake/output data recorded.    Temp (24hrs), Av.2 °C (95.4 °F), Min:35.2 °C (95.4 °F), Max:35.2 °C (95.4 °F)         Assessment/Plan     Patient has already been given a loading dose of 2000 mg in the ER on 24 @0506  Will initiate vancomycin maintenance,  1000 mg every 24 hours.    This dosing regimen is predicted by [employer] to result in the following pharmacokinetic parameters:  Regimen: 1000 mg IV every 24 hours.  Start time: 17:06 on 2024  Exposure target: AUC24 (range)400-600 mg/L.hr   AUC24,ss: 497 mg/L.hr  Probability of AUC24 > 400: 73 %  Ctrough,ss: 16 mg/L  Probability of Ctrough,ss > 20: 30 %  Probability of nephrotoxicity (Lodise GREG ): 11 %      Follow-up level will be ordered on 24 at 1000 and 1400 unless clinically indicated sooner.  Will continue to monitor renal function daily while on vancomycin and order serum creatinine at least every 48 hours if not already ordered.  Follow for continued vancomycin needs, clinical response, and signs/symptoms of toxicity.       NANCY AMADO, PharmD

## 2024-06-08 NOTE — Clinical Note
Angioplasty of the left anterior descending lesion. Inflation 1: Pressure = 20 sandra; Duration = 15 sec. Inflation 2: Pressure = 20 sandra; Duration = 20 sec.

## 2024-06-08 NOTE — Clinical Note
Vessel: LAD (proximal). Stent inserted. Inflation 1: Pressure = 12 sandra; Duration = 8 sec. Inflation 2: Pressure = 12 sandra; Duration = 20 sec. Inflation 3: Pressure = 8 sandra; Duration = 10 sec.

## 2024-06-08 NOTE — Clinical Note
Angioplasty of the middle left anterior descending lesion. Inflation 1: Pressure = 8 sandra; Duration = 13 sec. Inflation 2: Pressure = 8 sandra; Duration = 12 sec. Inflation 3: Pressure = 10 sandra; Duration = 13 sec.

## 2024-06-08 NOTE — Clinical Note
Single view of the left anterior descending obtained using power injection. Post angiogram taken and reviewed.

## 2024-06-09 NOTE — SIGNIFICANT EVENT
Updated Plan of Care Note:    Vascular Surgery was consulted with concern for expanding LLE hematoma at ECMO cannulation site.    Seen and examined again at bedside this morning during AM rounds.     Proximal LLE remains soft and compressible. Low concern for uncontrolled bleeding into left thigh, as compartment would be tense after 15u of pRBCs. Hemodynamics have since improved. Patient no longer on pressor support.      Recommendations:  - No indication for operative intervention by Vascular Surgery at this time given high risk 2/2 coagulopathy  -will continue to monitor     Seen with chief resident, Dr. Metz. To be discussed with attending, Dr. Kendrick.     Ann Andujar MD  General Surgery PGY1  Vascular Surgery

## 2024-06-09 NOTE — SIGNIFICANT EVENT
Acute care surgery updated plan of care    Reexamined patient this morning given overnight concern for potential compartment syndrome.  Currently abdomen is soft and not significantly distended on exam.  Ventilator settings with peak pressures of 30 however means and 20s.  Recent blood pressure was 8 1 paralyze per the ICU.  Nothing on exam or objective findings are concerning at this point for compartment syndrome.  If increasing distention or worsening hypotension despite resuscitation, please let ACS know.  At this time no concern.    Please call with further questions

## 2024-06-09 NOTE — PROGRESS NOTES
CTICU Progress Note  Ranjit Zurita/[id number]    Admit Date: 6/8/2024  Hospital Length of Stay: 1   ICU Length of Stay: 8h   CT SURGEON:     SUBJECTIVE:   Pt resuscitated for low flows and anemia s/p VA ECMO cannulation and L groin hematoma after cannulation due to AMI related cardiogenic shock. Early this AM, Impella with persistent low flow and suction events, ECHO showing clot in LV and around Impella.  Impella placed on P0 to prevent complications.      MEDICATIONS  Infusions:  amiodarone, Last Rate: 1 mg/min (06/09/24 0843)  cisatracurium, Last Rate: 3 mcg/kg/min (06/09/24 0415)  fentaNYL, Last Rate: 50 mcg/hr (06/09/24 0415)  lactated Ringer's  lactated Ringer's  nitroglycerin, Last Rate: 160 mcg/min (06/09/24 0805)  norepinephrine, Last Rate: Stopped (06/09/24 0415)  propofol, Last Rate: 50 mcg/kg/min (06/09/24 0604)  sodium bicarbonate infusion Impella Purge 50 mEq/1000 mL D5W      Scheduled:  albumin human, ,   albumin human, ,   amiodarone, 150 mg, Once  aspirin, 81 mg, Daily  atorvastatin, 80 mg, Nightly  calcium chloride, ,   calcium chloride, ,   EPINEPHrine HCl (PF), ,   fentaNYL, 25 mcg, Once  heparin, ,   insulin lispro, 0-15 Units, q4h  lubricating eye drops, 2 drop, q6h  magnesium sulfate, ,   nitroglycerin in 5 % dextrose, ,   oxygen, , Continuous - Inhalation  pantoprazole, 40 mg, BID   Or  pantoprazole, 40 mg, BID  pantoprazole, 40 mg, Daily before breakfast  perflutren lipid microspheres, 0.5-10 mL of dilution, Once in imaging  perflutren protein A microsphere, 0.5 mL, Once in imaging  piperacillin-tazobactam, 4.5 g, q6h  polyethylene glycol, 17 g, Daily  sulfur hexafluoride microsphr, 2 mL, Once in imaging  [Held by provider] ticagrelor, 90 mg, BID      PRN:  albumin human, ,   albumin human, ,   calcium chloride, ,   calcium chloride, ,   cisatracurium, 0.1 mg/kg, PRN  dextrose, 25 g, q15 min PRN   Or  glucagon, 1 mg, q15 min PRN  EPINEPHrine HCl (PF), ,   fentaNYL, 25 mcg, q10 min  "PRN  heparin, ,   HYDROmorphone, 0.2 mg, q15 min PRN  magnesium sulfate, ,   naloxone, 0.2 mg, q5 min PRN  nitroglycerin in 5 % dextrose, ,         PHYSICAL EXAM:   Visit Vitals  BP (!) 50/28   Pulse 76   Temp 35.8 °C (96.4 °F)   Resp 18   Ht 1.83 m (6' 0.05\")   Wt 85.6 kg (188 lb 11.4 oz)   SpO2 100%   BMI 25.56 kg/m²   Smoking Status Every Day   BSA 2.09 m²     Wt Readings from Last 5 Encounters:   06/08/24 85.6 kg (188 lb 11.4 oz)   06/08/24 82.2 kg (181 lb 3.5 oz)   06/06/24 81.6 kg (180 lb)   05/21/24 83.7 kg (184 lb 9.6 oz)   05/08/24 83.6 kg (184 lb 4.8 oz)     INTAKE/OUTPUT:  I/O last 3 completed shifts:  In: 578.1 (6.8 mL/kg) [I.V.:378.1 (4.4 mL/kg); IV Piggyback:200]  Out: 855 (10 mL/kg) [Urine:855 (0.3 mL/kg/hr)]  Weight: 85.6 kg        LDA:  CVC 06/09/24 Double lumen Right Internal jugular (Active)   Placement Date/Time: 06/09/24 (c) 0730   Hand Hygiene Performed Prior to CVC Insertion: Yes  Site Prep: Chlorhexidine   All 5 Sterile Barriers Used (Gloves, Gown, Cap, Mask, Large Sterile Drape): Yes  Local Anesthetic: None  Lumen Type: Double lumen  ...   Number of days: 0       Arterial Line 06/08/24 Right Radial (Active)   Placement Date/Time: 06/08/24 1000   Placed by External Staff?: Other hospital  Orientation: Right  Location: Radial   Number of days: 0       Ventricular Assist Device Impella (Active)   Placement Date/Time: 06/08/24 1002   Placed by: [employer]  Site Location: Groin right  VAD Type: Left ventricular assist device  VAD Brand: Impella   Number of days: 0       Venous Sheath Other (Comment) Right Femoral (Active)   Placement Date/Time: 06/08/24 0927   Hand Hygiene Completed: Yes  Sheath Size: (c) Other (Comment)  Line Orientation: Right  Sheath Insertion Site: Femoral  Sutured: Yes  Local Anesthetic: Injectable  Site Prep: Chlorhexidine   Free Flowing Blood Retu...   Number of days: 0       Pulmonary Artery Catheter Femoral (Active)   Placement Date/Time: 06/08/24 1000   Placed by External " Staff?: Other hospital  Description (optional): via venous sheath  Location: Femoral  Securement Method: Sutured   Number of days: 0       ETT  7.5 mm (Active)   Placement Date/Time: 06/08/24 0755   Earliest Known Present: 06/08/24  Mask Ventilation: Vent by mask  Technique: Direct laryngoscopy;Flexible bronchoscopy;Lighted stylet;Video laryngoscopy  ETT Type: ETT - single  Single Lumen Tube Size: 7.5 mm  Cuff...   Number of days: 1       Urethral Catheter 18 Fr. (Active)   Placement Date/Time: 06/08/24 1510   Hand Hygiene Completed: Yes  Tube Size (Fr.): 18 Fr.  Catheter Balloon Size: 10 mL  Urine Returned: Yes   Number of days: 0       NG/OG/Feeding Tube OG - Hughes sump 16 Fr Center mouth (Active)   Placement Date/Time: 06/08/24 0810   Placed by: Yeimy Ayala RN  Type of Tube: NG/OG Tube  Tube Length: 70 cm  Tube Type: OG - Hughes sump  NG/OG Tube Size: 16 Fr  Tube Location: Center mouth   Number of days: 1         Vent settings:  Vent Mode: Volume control/assist control  FiO2 (%):  [40 %-80 %] 40 %  S RR:  [18-20] 18  S VT:  [4 mL-480 mL] 400 mL  PEEP/CPAP (cm H2O):  [8 cm H20-10 cm H20] 10 cm H20  MAP (cm H2O):  [11-21] 18    Physical Exam:   Constitutional: ill appearing  HEENT: ETT in place, NGT in place with dark sanguinous output  Cardiac: RRR, rate 70 NSR  Pulmonary: mechanically ventilated, scant secretions from ETT  Abdomen: distended but compressible  GI: indwelling tillman  Extremities: L common fem AV cannula, R fem Impella, LLE thigh hematoma but soft and compressible  Neuro: sedated, paralyzed  Skin: pale, cool, dry  Vascular: unable to doppler signals in b/l DP, PT, radial      Images:     Invasive Hemodynamics:    Most Recent Range Past 24hrs   BP (Art) 76/63 Arterial Line BP 1  Min: 54/50  Max: 128/83   MAP(Art) 68 mmHg Arterial Line MAP 1 (mmHg)   Min: 44 mmHg  Max: 101 mmHg   RA/CVP   No data recorded   PA 19/15 PAP  Min: 19/15  Max: 44/32   PA(mean) 18 mmHg PAP (Mean)  Min: 6 mmHg  Max: 35 mmHg    CO 3.8 L/min CO (L/min)  Min: 3.8 L/min  Max: 3.8 L/min   CI 1.8 L/min/m2 CI (L/min/m2)  Min: 1.8 L/min/m2  Max: 1.8 L/min/m2   Mixed Venous 0.52 % SVO2 (%)  Min: 0.52 %  Max: 0.52 %   SVR  1142 (dyne*sec)/cm5 SVR (dyne*sec)/cm5  Min: 1142 (dyne*sec)/cm5  Max: 1142 (dyne*sec)/cm5       Daily Risk Screen:  Hemodynamic monitoring  critically ill patient who need accurate urinary output measurements  they have continued cardiopulmonary lability/instability      Assessment/Plan   Ranjit Zurita is a 64 y.o. male with PMHx of CAD c/b MI (PCI 12/2023 JOE RCA, 10/2017 JOE pLAD), carotid atherosclerosis s/p stenting & balloon angioplasty 2013, PVD s/p iliac stent 2014, DLD, HTN, remote CVA who was transferred from [facility] with concern for cardiogenic shock s/p PCI of LAD in the setting of MI. Cannulated for ECMO at bedside and transfer to CTICU.     Patient underwent colonoscopy 6/6 and had complaints of chest pressure after. Presented to [facility] ED 6/8 for chest pain, SOB, diaphoresis, and nausea. Found to have troponins >100k, STEMI iso stopping ASA/Plavix for 7 days prior to 6/7/24 colonoscopy. Patient taken emergently to Cath lab, found to have instent thrombosis of LAD. Had reintervention and an Impella was placed, during which he arrested <1 min. Patient transferred post LAD revascularization to [facility] CICU on 6/8/24 with Impella in place and mechanically ventilated. Became more tachycardic with increasing lactate and pressure support. S/p ECMO cannulation 6/9 to left common fem.     Plan:  NEURO:  No PMH. Pt paralyzed on cisatracurium, also on prop/fentanyl.  -->  - Serial neuro and pain assessments   - Will stop cisatracurium and perform SAT this AM.   - Continue propofol infusion while intubated  - PT Consult, OOB to chair as tolerated, chair position if not tolerated   - CAM ICU score qshift  - Sleep/wake cycle hygiene     CV:  Patient has a history HTN, HLD, CAD s/p PCI, Instent rethrombosis of LAD, now  recanalized with good flow, but c/b AMI related cardiogenic shock with EF of 5-10%. Impella placed at OSH nonfunctional, presumably due to clot. VA ECMO cannulated, with bleeding from distal perfusion cath, which is now turned off. Pt currently Epi 02, Ntg 100 (will wean down). Sweep of 5/100%. Will wean Ntg off. Impella at P0. Started on amio for afib RVR, no in NSR  - D/W IC cards, remove Impella this AM  - Maintain goal MAP >70  - Start ASA and Cangrelor after impella removed and hemostatsis achieved at groin site  - Statin when appropriate, waiting for CK levels  - Wean Nitroglycerine gtt  - Maintain epi 0.2  - Continue amio  - Wean sweep as tolerated  - Poor perfusion to LLE after distal perfusion canula off, vascular following, will trend CK  - Daily LDH     PULM:  No history of pulmonary disease.  Currently intubated on ventilator. Pulm edema vs TRALI on CXR. -->  - F/u CXR  - Once reversed, wean ventilator settings towards CPAP & extubation   - Wean FiO2 and maintain higher PEEP maintaining SpO2 >92%.   - IS q1h and OOB to chair when extubated      GI:  Pt had stopped DAPT for colonoscopy d/t NETTE. 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 3 clips placed during colonoscopy for small bleeds. OG with some mild coffee ground emesis. -->  - Continue PPI BID   - NPO  - Trend LFTs     :   History of CKD stage III, Creatinine uptrending. Tillman in place and making adequate UOP. -->  - Continue tillman catheter for strict I/Os.  - Goal UOP 0.5ml/kg/hr  - RFP as clinically indicated  - Replete electrolytes per CTICU protocol     ENDO:  NO PMH->  - Maintain BG <180, insulin per CTICU protocol     HEME:  Acute blood loss anemia and thrombocytopenia s/p MTP after cannulation and L groin hematoma->    - Monitor groin site  - CBC, coags, and fibrinogen post op and as clinically indicated  - Hold systemic AC due to high risk of bleeding   - Start ASA and Cangrelor after impella removed and  hemostatsis achieved at groin site  - SCDs for DVT prophylaxis.        ID:  Afebrile, no current indications of infection. ->  - Trend temp q4h  - Give 1 time dose of Vanco for cannulation  - Stop Zosyn     Skin:  No active skin issues.  - preventative Mepilex dressings in place on sacrum and heels  - change preventative Mepilex weekly or more frequently as indicated (when moist/soiled)   - every shift skin assessment per nursing and weekly ICU skin rounds  - moisture barrier to be applied with moshe care  - active skin problems addressed with nursing on daily rounds     Proph:  SCDs  PPI     G:  Line  Right IJ MAC 6/9/24  R radial  a-line placed 6/8/24  Fem Fem VA EMCO, R groin PAC/impella     F: Family: will update at bedside postoperatively.     A,B,C,D,E,F,G: reviewed     Dispo: CTICU care for now.    CTICU TEAM PHONE [phone]          Code status: Full Code    I personally spent 90 minutes of critical care time directly and personally managing the patient exclusive of separately billable procedures.     A,B,C,D,E,F,G: reviewed     Dispo: CTICU care for now.    CTICU TEAM PHONE [phone]

## 2024-06-09 NOTE — SIGNIFICANT EVENT
Vascular Surgery Plan of Care Update  Vascular surgery involved in patient case since early this morning initially due to bleeding concerns following ECMO cannulation.   Issues maintaining flow following cannulation due to concern for volume loss, MTP activated and transfused nearly 24u of blood product; initial concern was for bleeding into thigh from dislodged reperfusion cannula, however abdomen also progressively distended and therefore RP bleed suspected.    Ultimately, hemodynamics improved with resuscitation and operative intervention for any suspected thigh or RP hematoma deemed too risky and may ultimately be deadly. Due to need for reperfusion cannula to be turned off to avoid ongoing bleeding, no signal present in LLE; no signals were present on vascular exam in RLE as well (this may be due to patient having underlying occlusive disease with R femoral impella still in place).     Femoral impella removed at bedside by cardiology around 11am and following manual pressure no signals still in RLE. Vascular surgery called to evaluate around 430p. On my exam, bilateral feet with mottling, L ankle in alexandre rigor and R ankle close to similar rigidity. No signals present at ankles or in feet (only venous signals RLE).    Long-standing ischemic b/l due to cardiogenic shock, as well as hypoperfusion due to devices in bilateral femoral arteries, possibly in the context of pre-existing occlusive disease. Due to patient thrombocytopenia and resolving coagulopathy, bleeding risk is quite high as operative intervention for RLE (which is the only one that may have some change of salvagability, although that is highly debatable), would require open femoral exploration, thrombectomy, followed by fasciotomies. Therefore, vascular surgery has elected not to pursue operative intervention for LE ischemia at this time.     In the context of patient's ongoing cardiogenic shock, it seems discussions with family have resulted in  impending comfort measures.    Discussed with Dr. Aroldo Metz MD  PGY5 - Integrated Vascular Surgery

## 2024-06-09 NOTE — SIGNIFICANT EVENT
Called to perform TTE for patient given impella having suction alarms with 0L flow. Under TTE impella appeared to be slightly pulled back, Dr. Joe manipulated impella forward to ensure correct position under TTE guidance, inspite of this there remained no flow, discussed with impella support and most likely there is clot in the impella preventing flow.     Discussed with CTICU team Dr. Dillon and Dr. Carrasquillo that the impella should be removed given the clot and risks associated with it given patient is not on systemic AC and the impella is not flowing. However patient noted to have PLT <40, had already had bleeding with concern for coagulopathy so the risk of removing the impella and having a massive bleed outweighed the benefit. Decision was made to hold off on removal at this time and re-assess in a few hours pending stability and improvement in numbers.

## 2024-06-09 NOTE — PROGRESS NOTES
Ranjit Zurita is a 64 y.o. male on day 1 of admission presenting with STEMI (ST elevation myocardial infarction) (Multi).    Patient requires ECMO support. Drainage cannula site, cannula, pump, oxygenator, return cannula and return cannula site clinically inspected. RPM and sweep reviewed and adjusted appropriate to clinical context. Anticoagulation status and intensity discussed. Plan for weaning, next clinical steps discussed with team and family. Discussed with cardiothoracic surgeon, perfusion, palliative care and physical/occupational therapy    ECMO Indication: Cardiogenic shock following AMI  ECMO Cannula Configuration: Fem Fem VA ECMO  ECMO circuit run from drainage cannula to pump to oxygenator to return cannula: Yes  Presence of cannula bleeding: No  Presence of oxygenator clot: No  Pre/post PaO2 adequate: Yes  LV Unloading/Pulse Pressure: 20, with Epi  Distal Perfusion: DPC in left leg is off, concern that that site was source of significant bleeding. No pulse as a result distal to that site. Vascular involved  Anticoagulation Plan/Monitoring: Will start ASA/Cangrelor post Impella pull  Mobility Plan/Candidacy: Not a candidate  Path to decannulation/anticipated decannulation date: uncertain  ECMO:     Most Recent Range Past 24hrs   Flow 3.4 Patient Flow (L/min)  Min: 3.2  Max: 3.51   Speed   No data recorded   Sweep   No data recorded       amiodarone, 0.5-1 mg/min, Last Rate: 1 mg/min (06/09/24 0900)  cangrelor, 0.75 mcg/kg/min  EPINEPHrine, 0.01-1 mcg/kg/min  fentaNYL, 0-300 mcg/hr, Last Rate: 50 mcg/hr (06/09/24 0900)  lactated Ringer's, 5 mL/hr, Last Rate: 5 mL/hr (06/09/24 0919)  lactated Ringer's, 30 mL/hr, Last Rate: 30 mL/hr (06/09/24 0920)  nitroglycerin, 5-200 mcg/min, Last Rate: 100 mcg/min (06/09/24 0900)  norepinephrine, 0.01-0.5 mcg/kg/min, Last Rate: Stopped (06/09/24 0415)  propofol, 5-50 mcg/kg/min, Last Rate: 50 mcg/kg/min (06/09/24 0922)  sodium bicarbonate infusion Impella Purge 50  mEq/1000 mL D5W, 10 mL/hr          Vent Mode: Volume control/assist control  FiO2 (%):  [40 %-80 %] 40 %  S RR:  [18-20] 18  S VT:  [4 mL-480 mL] 400 mL  PEEP/CPAP (cm H2O):  [8 cm H20-10 cm H20] 10 cm H20  MAP (cm H2O):  [11-21] 18      Impella:      Most Recent Range Past 24hrs   Performance Level 0 P Level  Min: 0   Min taken time: 06/09/24 0800  Max: 9   Max taken time: 06/08/24 2300   Flow (L/min) 0 Flow (L/min)  Min: 0   Min taken time: 06/09/24 0800  Max: 3.6   Max taken time: 06/08/24 2200   Motor Current 0/0 Motor Current  Min: 0/0   Min taken time: 06/09/24 0800  Max: 960/788   Max taken time: 06/08/24 1700   Placement Signal  (clotted off)  Placement OK could not be evaluated. This SmartLink does not work with rows of the type: Custom List   Purge (mmHg) 560 Purge Pressure (mmHg)  Min: 541   Min taken time: 06/08/24 2000  Max: 594   Max taken time: 06/08/24 1700   Purge rate (mL/hr) 7 Purge Rate (mL/hr)  Min: 6.1   Min taken time: 06/08/24 1600  Max: 7   Max taken time: 06/09/24 0800       Impella has clotted off. LV thrombus present. Will remove.         Christopher Leong MD

## 2024-06-09 NOTE — H&P
History Of Present Illness  Ranjit Zurita is a 64 y.o. male with PMHx of CAD c/b MI (PCI 12/2023 JOE RCA, 10/2017 JOE pLAD), carotid atherosclerosis s/p stenting & balloon angioplasty 2013, PVD s/p iliac stent 2014, DLD, HTN, remote CVA who was transferred from [facility] with concern for cardiogenic shock s/p PCI of LAD in the setting of MI. Cannulated for ECMO at bedside and transfer to CTICU.     Patient underwent colonoscopy 6/6 and had complaints of chest pressure after. Presented to [facility] ED 6/8 for chest pain, SOB, diaphoresis, and nausea. Found to have troponins >100k, STEMI iso stopping ASA/Plavix for 7 days prior to 6/7/24 colonoscopy. Patient taken emergently to Cath lab, found to have instent thrombosis of LAD. Had reintervention and an Impella was placed, during which he arrested <1 min. Patient transferred post LAD revascularization to [facility] CICU on 6/8/24 with Impella in place and mechanically ventilated. Became more tachycardic with increasing lactate and pressure support. S/p ECMO cannulation 6/9 to left common fem.      Past Medical History  He has a past medical history of Adenomatous polyp of ascending colon (03/12/2024), Atherosclerotic heart disease of native coronary artery without angina pectoris, CAD S/P percutaneous coronary angioplasty (10/13/2023), Carotid atherosclerosis (10/13/2023), CKD (chronic kidney disease), stage III (Multi), Encounter for screening for malignant neoplasm of colon, History of transient ischemic attack (10/13/2023), Hyperlipidemia, Hypertension, Intermittent claudication (CMS-HCC) (10/13/2023), Iron deficiency anemia due to chronic blood loss (03/12/2024), Neuralgia and neuritis, unspecified, PAD (peripheral artery disease) (CMS-HCC) (10/13/2023), and Subclavian artery stenosis, left (CMS-HCC) (05/08/2024).     Surgical History  He has a past surgical history that includes Other surgical history (05/28/2019); Cardiac catheterization (N/A, 12/05/2023); and  Cardiac catheterization (N/A, 12/08/2023).     Social History  He reports that he has been smoking cigarettes. He has never used smokeless tobacco. He reports that he does not currently use alcohol. He reports that he does not use drugs.     Family History  Family History          Family History   Problem Relation Name Age of Onset    Breast cancer Mother        Hypertension Father        Heart failure Father                Allergies  Cyclobenzaprine and Losartan     Review of Systems  Unable to obtain due to intubation/sedation/paralyzation      Physical Exam  Physical Exam  Constitutional: ill appearing  HEENT: ETT in place, NGT in place with dark sanguinous output  Cardiac: RRR, on levo 0.2, vaso 0.04  Pulmonary: mechanically ventilated  Abdomen: distended but compressible  GI: indwelling tillman  Extremities: L common fem AV cannula, R fem Impella, LLE thigh hematoma but soft and compressible  Neuro: sedated, paralyzed  Skin: pale, cool, dry  Vascular: unable to doppler signals in b/l DP, PT, radial     Past Medical History  Past Medical History:   Diagnosis Date    Adenomatous polyp of ascending colon 03/12/2024    Atherosclerotic heart disease of native coronary artery without angina pectoris     3-vessel CAD    CAD S/P percutaneous coronary angioplasty 10/13/2023    Carotid atherosclerosis 10/13/2023    CKD (chronic kidney disease), stage III (Multi)     Encounter for screening for malignant neoplasm of colon     Encounter for screening colonoscopy    History of transient ischemic attack 10/13/2023    Hyperlipidemia     Hypertension     Intermittent claudication (CMS-HCC) 10/13/2023    Iron deficiency anemia due to chronic blood loss 03/12/2024    Neuralgia and neuritis, unspecified     Neuralgia    PAD (peripheral artery disease) (CMS-HCC) 10/13/2023    Subclavian artery stenosis, left (CMS-HCC) 05/08/2024       Surgical History  Past Surgical History:   Procedure Laterality Date    CARDIAC CATHETERIZATION N/A  "12/05/2023    Procedure: Left Heart Cath, With LV;  Surgeon: Mac Lomas MD;  Location: [facility];  Service: Cardiovascular;  Laterality: N/A;    CARDIAC CATHETERIZATION N/A 12/08/2023    Procedure: PCI JOE Stent- Coronary;  Surgeon: Mac Lomas MD;  Location: [facility];  Service: Cardiovascular;  Laterality: N/A;  RCA    OTHER SURGICAL HISTORY  05/28/2019    Carotid artery stent placement        Social History  He reports that he has been smoking cigarettes. He has never used smokeless tobacco. He reports that he does not currently use alcohol. He reports that he does not use drugs.    Family History  Family History   Problem Relation Name Age of Onset    Breast cancer Mother      Hypertension Father      Heart failure Father          Allergies  Cyclobenzaprine and Losartan    Review of Systems: Patient sedated and intubated     Physical Exam     Last Recorded Vitals  Blood pressure (!) 50/28, pulse 81, temperature 35.9 °C (96.6 °F), temperature source Core, resp. rate 20, height 1.83 m (6' 0.05\"), weight 85.6 kg (188 lb 11.4 oz), SpO2 100%.    Relevant Results           Assessment/Plan   Principal Problem:    STEMI (ST elevation myocardial infarction) (Multi)     64 y.o. male with PMHx of CAD c/b MI (PCI 12/2023 JOE RCA, 10/2017 JOE pLAD), carotid atherosclerosis s/p stenting & balloon angioplasty 2013, PVD s/p iliac stent 2014, DLD, HTN, remote CVA who was transferred from [facility] with concern for cardiogenic shock s/p PCI of LAD in the setting of MI. Cannulated for ECMO at bedside and transfer to CTICU.     Plan:  NEURO:  No PMH. Patient is intubated and sedated on propofol infusion..   -->  - Serial neuro and pain assessments   - Continue propofol infusion while intubated  - Scheduled Tylenol   - PRN oxycodone  - PRN dilaudid for pain   - PT Consult, OOB to chair as tolerated, chair position if not tolerated   - CAM ICU score qshift  - Sleep/wake cycle hygiene   "   CV:  Patient has a history HTN, HLD, PVD, CAD c/b MI (PCI 12/2023 JOE RCA, 10/2017 JOE pLAD), s/p thrombosis of stent c/b MI, c/b cardiogenic shock s/p [facility] ECMO on 6/8/2024  - Maintain goal MAP >70  - Volume resuscitate as clinically indicated  - Maintain epicardial wires set VVI @ 90        PULM:  No history of pulmonary disease.  Currently intubated on ventilator. Pulmonary edema noted on CXR-->  - F/u CXR  - Once reversed, wean ventilator settings towards CPAP & extubation   - Wean FiO2 maintaining SpO2 >92%.   - IS q1h and OOB to chair when extubated       GI:  NO PMH.-->  - Continue PPI until extubated  - NPO, will perform bedside swallow eval post extubation   - Colace/senna BID and miralax BID     :   History of CKD stage III, . Creatinine uptrending. Tillman in place and making adequate UOP. -->  - Continue tillman catheter for strict I/Os.  - Goal UOP 0.5ml/kg/hr  - RFP as clinically indicated  - Replete electrolytes per CTICU protocol     ENDO:  NO PMH->  - Maintain BG <180, insulin per CTICU protocol     HEME:  Acute blood loss anemia and thrombocytopenia..-->    - Monitor drain output volume and characteristics  - CBC, coags, and fibrinogen post op and as clinically indicated  - Hold AC due to high risk of bleeding   - SCDs for DVT prophylaxis.       ID:  Afebrile, no current indications of infection. ->  - Trend temp q4h     Skin:  No active skin issues.  - preventative Mepilex dressings in place on sacrum and heels  - change preventative Mepilex weekly or more frequently as indicated (when moist/soiled)   - every shift skin assessment per nursing and weekly ICU skin rounds  - moisture barrier to be applied with moshe care  - active skin problems addressed with nursing on daily rounds     Proph:  SCDs  PPI     G:  Line  Right IJ MAC 6/9/24  Left radial  a-line placed 6/8/24     F: Family: will update at bedside postoperatively.     A,B,C,D,E,F,G: reviewed     Dispo: CTICU care for now.    CTICU TEAM PHONE  [phone]                    Amando Ortiz MD  Anesthesiology and critical care medicine

## 2024-06-09 NOTE — SIGNIFICANT EVENT
Patient requires ECMO support. Drainage cannula site, cannula, pump, oxygenator, return cannula and return cannula site clinically inspected. RPM and sweep reviewed and adjusted appropriate to clinical context. Anticoagulation status and intensity discussed. Plan for weaning, next clinical steps discussed with team and family. Discussed with cardiothoracic surgeon, perfusion, palliative care and physical/occupational therapy    ECMO Indication: cardiogenic shock   ECMO Cannula Configuration: left fem fem   ECMO circuit run from drainage cannula to pump to oxygenator to return cannula: yes  Presence of cannula bleeding: no  Presence of oxygenator clot: no  Pre/post PaO2 adequate: yes  LV Unloading/Pulse Pressure: epi/yes   Distal Perfusion: yes now turned off   Anticoagulation Plan/Monitoring: none at this time   Mobility Plan/Candidacy: no  Path to decannulation/anticipated decannulation date:undermined   ECMO:     Most Recent Range Past 24hrs   Flow   No data recorded   Speed   No data recorded   Sweep   No data recorded       amiodarone, 0.5-1 mg/min, Last Rate: 1 mg/min (06/08/24 2311)  cisatracurium, 0.5-10 mcg/kg/min (Ideal)  fentaNYL, 0-300 mcg/hr, Last Rate: 75 mcg/hr (06/08/24 2223)  heparin, 0-4,000 Units/hr, Last Rate: Stopped (06/08/24 2120)  norepinephrine, 0.01-0.5 mcg/kg/min, Last Rate: 0.3 mcg/kg/min (06/08/24 2248)  propofol, 5-50 mcg/kg/min, Last Rate: 30 mcg/kg/min (06/09/24 0048)  sodium bicarbonate infusion Impella Purge 50 mEq/1000 mL D5W, 10 mL/hr          Vent Mode: Volume control/assist control  FiO2 (%):  [40 %-80 %] 40 %  S RR:  [14-20] 20  S VT:  [4 mL-480 mL] 4 mL  PEEP/CPAP (cm H2O):  [8 cm H20-10 cm H20] 10 cm H20  MAP (cm H2O):  [7.7-21] 21    Keysha Dillon MD  Anesthesia and Critical care staff

## 2024-06-09 NOTE — CONSULTS
[facility]  ACUTE CARE SURGERY - HISTORY AND PHYSICAL / CONSULT    Patient Name: Ranjit Zurita  MRN: [id number]  Admit Date: 608  : 1960  AGE: 64 y.o.   GENDER: male  ==============================================================================  TODAY'S ASSESSMENT AND PLAN OF CARE:  Ranjit Zurita is a 64 y.o. male with PMHx of CAD c/b MI (PCI 2023 OJE RCA, 10/2017 JOE pLAD), carotid atherosclerosis s/p stenting & balloon angioplasty , PVD s/p iliac stent , DLD, HTN, remote CVA who was transferred from [facility] with concern for cardiogenic shock s/p PCI of LAD in the setting of MI. Vascular Surgery was consulted with concern for retroperitoneal bleeding s/p ECMO cannulation.    S/p 15u pRBCs, 12u FFP, and 1u platelets. Hemodynamics improved. Pressors weaned off. Abdomen is distended but remains compressible.     Recommendations:  - No indication for operative intervention by ACS at this time given improvement in hemodynamics and high operative risk 2/2 coagulopathy     Seen with Dr. Casillas and Dr. Bolden.    Lola Bahena MD  ACS  [id number]    ==============================================================================  CHIEF COMPLAINT/REASON FOR CONSULT:  Concern for retroperitoneal bleeding s/p ECMO    HPI:  Ranjit Zurita is a 64 y.o. male with PMHx of CAD c/b MI (PCI 2023 JOE RCA, 10/2017 JOE pLAD), carotid atherosclerosis s/p stenting & balloon angioplasty , PVD s/p iliac stent , DLD, HTN, remote CVA who was transferred from [facility] with concern for cardiogenic shock s/p PCI of LAD in the setting of MI. Vascular Surgery was consulted with concern for retroperitoneal bleeding s/p ECMO cannulation.     Per EMR: Patient underwent colonoscopy  and had complaints of chest pressure after. Presented to [facility] ED  for chest pain, SOB, diaphoresis, and nausea. Found to have troponins >100k, STEMI iso stopping ASA/Plavix for 7 days  prior to 6/7/24 colonoscopy. Patient taken emergently to Cath lab, found to have instent thrombosis of LAD. Had reintervention and an Impella was placed, during which he arrested <1 min. Patient transferred post LAD revascularization to [facility] CICU on 6/8/24 with Impella in place and mechanically ventilated. Became more tachycardic with increasing lactate and pressure support. S/p ECMO cannulation 6/9 to left common fem.     Several hours after the procedure, patient became hypotensive w/ MAPS 40s and increasing pressor requirements. Concern for retroperitoneal bleeding. Patient given 15u pRBCs, 12u FFP, and 1u platelets at this point without appropriate improvement in hemodynamics or Hgb (5.6 to 6.4).    PAST MEDICAL HISTORY:   PMH:   Past Medical History:   Diagnosis Date    Adenomatous polyp of ascending colon 03/12/2024    Atherosclerotic heart disease of native coronary artery without angina pectoris     3-vessel CAD    CAD S/P percutaneous coronary angioplasty 10/13/2023    Carotid atherosclerosis 10/13/2023    CKD (chronic kidney disease), stage III (Multi)     Encounter for screening for malignant neoplasm of colon     Encounter for screening colonoscopy    History of transient ischemic attack 10/13/2023    Hyperlipidemia     Hypertension     Intermittent claudication (CMS-HCC) 10/13/2023    Iron deficiency anemia due to chronic blood loss 03/12/2024    Neuralgia and neuritis, unspecified     Neuralgia    PAD (peripheral artery disease) (CMS-HCC) 10/13/2023    Subclavian artery stenosis, left (CMS-HCC) 05/08/2024     PSH:   Past Surgical History:   Procedure Laterality Date    CARDIAC CATHETERIZATION N/A 12/05/2023    Procedure: Left Heart Cath, With LV;  Surgeon: Mac Lomas MD;  Location: [facility]iac [facility];  Service: Cardiovascular;  Laterality: N/A;    CARDIAC CATHETERIZATION N/A 12/08/2023    Procedure: PCI JOE Stent- Coronary;  Surgeon: Mac Lomas MD;  Location: [facility]  Cath Lab;  Service: Cardiovascular;  Laterality: N/A;  RCA    OTHER SURGICAL HISTORY  05/28/2019    Carotid artery stent placement     FH:   Family History   Problem Relation Name Age of Onset    Breast cancer Mother      Hypertension Father      Heart failure Father       SOCIAL HISTORY:    Smoking:    Social History     Tobacco Use   Smoking Status Every Day    Current packs/day: 0.25    Types: Cigarettes   Smokeless Tobacco Never   Tobacco Comments    Had smoked uped to 1 PPD, currently 5-6 a day       Alcohol:    Social History     Substance and Sexual Activity   Alcohol Use Not Currently    Comment: rare       Drug use: unable to obtain    MEDICATIONS:   Prior to Admission medications    Medication Sig Start Date End Date Taking? Authorizing Provider   amLODIPine (Norvasc) 10 mg tablet Take 0.5 tablets (5 mg) by mouth once daily. 12/8/23  Yes Candace Kendrick APRN-CNP   aspirin 81 mg EC tablet Take 1 tablet (81 mg) by mouth once daily. 1/25/22  Yes Historical Provider, MD   atorvastatin (Lipitor) 40 mg tablet TAKE 1 TABLET BY MOUTH EVERY DAY 1/11/24  Yes Mac Lomas MD   clopidogrel (Plavix) 75 mg tablet Take 1 tablet (75 mg) by mouth once daily. 3/13/24  Yes Mac Lomas MD   metoprolol tartrate (Lopressor) 25 mg tablet TAKE 1 TABLET TWICE DAILY 1/11/24  Yes Mac Lomas MD   multivitamin tablet Take 1 tablet by mouth once daily. 1/25/22  Yes Historical Provider, MD   nitroglycerin (Nitrostat) 0.4 mg SL tablet Place 1 tablet (0.4 mg) under the tongue every 5 minutes if needed for chest pain (up to 3 doses). 12/27/23  Yes Mac Lomas MD     ALLERGIES:   Allergies   Allergen Reactions    Cyclobenzaprine Hives    Losartan Myalgia     Joint pain all over     REVIEW OF SYSTEMS:  Review of Systems  Unable to obtain due to intubation/sedation/paralyzation     PHYSICAL EXAM:  Physical Exam  Physical Exam  Constitutional: ill appearing  HEENT: ETT in place, NGT in place  with dark sanguinous output  Cardiac: RRR, on levo 0.2, vaso 0.04  Pulmonary: mechanically ventilated  Abdomen: distended but compressible  GI: indwelling tillman  Extremities: L common fem AV cannula, R fem Impella, LLE thigh hematoma but soft and compressible  Neuro: sedated, paralyzed  Skin: pale, cool, dry    IMAGING SUMMARY:     LABS:  Results from last 7 days   Lab Units 06/09/24  0201 06/08/24  2047 06/08/24  1258 06/08/24  0255   WBC AUTO x10*3/uL 13.1* 28.8* 27.6* 27.7*   HEMOGLOBIN g/dL 5.3* 8.6* 10.6* 11.9*   HEMATOCRIT % 16.8* 27.4* 32.2* 38.8*   PLATELETS AUTO x10*3/uL 98* 213 196 291   NEUTROS PCT AUTO %  --   --  89.6 86.5   LYMPHS PCT AUTO %  --   --  1.8 4.6   MONOS PCT AUTO %  --   --  7.5 8.1   EOS PCT AUTO %  --   --  0.0 0.0     Results from last 7 days   Lab Units 06/09/24  0201 06/08/24  1400 06/08/24  0255   APTT seconds 95* >200*  --    INR  2.1* 1.4* 1.3*     Results from last 7 days   Lab Units 06/09/24  0201 06/08/24  1400 06/08/24  1239 06/08/24  0255   SODIUM mmol/L 135* 133* 133* 137   POTASSIUM mmol/L 5.3 5.1 4.8 4.0   CHLORIDE mmol/L 105 99 100 99   CO2 mmol/L 16* 26 27 15*   BUN mg/dL 42* 27* 26* 27*   CREATININE mg/dL 1.90* 1.52* 1.41* 2.00*   CALCIUM mg/dL 7.9* 8.2* 8.0* 9.1   PROTEIN TOTAL g/dL  --  5.5* 5.6* 6.7   BILIRUBIN TOTAL mg/dL  --  0.9 0.8 0.9   ALK PHOS U/L  --  78 68 79   ALT U/L  --  118* 107* 106*   AST U/L  --  555* 463* 443*   GLUCOSE mg/dL 230* 151* 169* 326*     Results from last 7 days   Lab Units 06/08/24  1400 06/08/24  1239 06/08/24  0255   BILIRUBIN TOTAL mg/dL 0.9 0.8 0.9   BILIRUBIN DIRECT mg/dL 0.2  --   --      Results from last 7 days   Lab Units 06/09/24  0226 06/09/24  0155 06/09/24  0034   POCT PH, ARTERIAL pH 7.28* 7.38 7.38   POCT PCO2, ARTERIAL mm Hg 28* 32* 34*   POCT PO2, ARTERIAL mm Hg 205* 201* 305*   POCT HCO3 CALCULATED, ARTERIAL mmol/L 13.2* 18.9* 20.1*   POCT BASE EXCESS, ARTERIAL mmol/L -12.4* -5.8* -4.6*         I have reviewed all  laboratory and imaging results ordered/pertinent for this encounter.

## 2024-06-09 NOTE — PROCEDURES
Central Line    Date/Time: 6/9/2024 7:30 AM    Performed by: Amando Ortiz MD  Authorized by: Amando Ortiz MD    Consent:     Consent obtained:  Emergent situation  Universal protocol:     Test results available: yes      Imaging studies available: yes      Required blood products, implants, devices, and special equipment available: yes      Patient identity confirmed:  Hospital-assigned identification number, verbally with patient, arm band and anonymous protocol, patient vented/unresponsive  Pre-procedure details:     Indication(s): central venous access, hemodynamic monitoring and insufficient peripheral access      Hand hygiene: Hand hygiene performed prior to insertion      Sterile barrier technique: All elements of maximal sterile technique followed      Skin preparation:  Chlorhexidine  Sedation:     Sedation type:  Deep  Anesthesia:     Anesthesia method:  None  Procedure details:     Location:  R internal jugular    Patient position:  Trendelenburg    Procedural supplies:  Double lumen    Ultrasound guidance: yes      Ultrasound guidance timing: prior to insertion and real time      Number of attempts:  1    Successful placement: yes    Post-procedure details:     Post-procedure:  Dressing applied and line sutured    Assessment:  Blood return through all ports, no pneumothorax on x-ray, free fluid flow and placement verified by x-ray    Procedure completion:  Tolerated

## 2024-06-09 NOTE — CONSULTS
Vancomycin Dosing by Pharmacy- INITIAL    Ranjit Zurita is a 64 y.o. year old male who Pharmacy has been consulted for vancomycin dosing for other surgical PPX . Based on the patient's indication and renal status this patient will be dosed based on a goal AUC of 500-600.     Renal function is currently stable.    Visit Vitals  BP (!) 50/28   Pulse 77   Temp 35.8 °C (96.4 °F)   Resp 18        Lab Results   Component Value Date    CREATININE 1.92 (H) 2024    CREATININE 1.73 (H) 2024    CREATININE 1.90 (H) 2024    CREATININE 1.52 (H) 2024        Patient weight is as follows:   Vitals:    24 1417   Weight: 85.6 kg (188 lb 11.4 oz)       Cultures:  No results found for the encounter in last 14 days.        I/O last 3 completed shifts:  In: 578.1 (6.8 mL/kg) [I.V.:378.1 (4.4 mL/kg); IV Piggyback:200]  Out: 855 (10 mL/kg) [Urine:855 (0.3 mL/kg/hr)]  Weight: 85.6 kg   I/O during current shift:  I/O this shift:  In: 440.7 [I.V.:440.7]  Out: 100 [Urine:100]    Temp (24hrs), Av.9 °C (96.7 °F), Min:35.5 °C (95.9 °F), Max:37 °C (98.6 °F)         Assessment/Plan     Patient 2000 mg x1 given yesterday.  Will initiate vancomycin maintenance,  1000 mg every 24 hours.    This dosing regimen is predicted by [employer] to result in the following pharmacokinetic parameters:  Loading dose: N/A  Regimen: 1000 mg IV every 24 hours.  Start time: 17:06 on 2024  Exposure target: AUC24 (range)400-600 mg/L.hr   AUC24,ss: 455 mg/L.hr  Probability of AUC24 > 400: 64 %  Ctrough,ss: 14.5 mg/L  Probability of Ctrough,ss > 20: 22 %  Probability of nephrotoxicity (Lodise GREG ): 10 %    Follow-up level will be ordered on 6/10/24 at 1st AM labs unless clinically indicated sooner.  Will continue to monitor renal function daily while on vancomycin and order serum creatinine at least every 48 hours if not already ordered.  Follow for continued vancomycin needs, clinical response, and signs/symptoms of toxicity.        Trever Saravia, PharmD

## 2024-06-09 NOTE — PROGRESS NOTES
Ranjit Zurita is a 64 y.o. male on day 1 of admission presenting with STEMI (ST elevation myocardial infarction) (Multi).    I have seen the patient either independently or with an associated resident physician or advanced practice provider    Overnight Events: Pt cannulated for VA ECMO due to AMI related cardiogenic shock. Complicated by bleeding from DPC catheter site. High transfusion needs pericannulation but has slowed into this AM.     Neuro: Pt paralyzed on cisatracurium, also on prop/fentanyl. Will stop cisatracurium and perform SAT this AM.   Cardiac: Instent rethrombosis of LAD, now recanalized with good flow, but c/b AMI related cardiogenic shock with EF of 5-10%. Impella placed at OSH nonfunctional, presumably due to clot. VA ECMO cannulated, with bleeding from distal perfusion cath, which is now turned off. Pt currently Epi 02, Ntg 100 (will wean down). Sweep of 5/100%. Will wean Ntg off. Impella at P0, will need to be removed by IC. Will start IN ASA, IV cangrelor given recent bleeding, but after impella pull. Pt does have LV thrombus on most recent echo   Pacer: None  Pulmonary:   Vent Mode: Volume control/assist control  FiO2 (%):  [40 %-80 %] 40 %  S RR:  [18-20] 18  S VT:  [4 mL-480 mL] 400 mL  PEEP/CPAP (cm H2O):  [8 cm H20-10 cm H20] 10 cm H20  MAP (cm H2O):  [11-21] 18  Pulm edema vs TRALI on CXR. Will manage with higher PEEP strategy.   Gastrointestinal: NPO, NG with some mild coffee ground emesis. Pt had stopped DAPT for colonoscopy, the stoppage of which we infer is the impetus for his cardiac event. 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 3 clips placed during colonoscopy for small bleeds. PPI BID  Renal: CKD stage 3 with baseline Cr 1.3. Cr 1.7 this AM. Making some urine, but presume will develop ANTHONY given hemodynamic insults. K is ok  Endocrine: SSI, no hx of DM  Hematology: Pt required MTP last evening. Hx of anemia with baseline HgB 8.2. Now 13 after  robust resuscitation. No bleeding currently. Will initiate DAPT with ASA/Cangrelor post impella pull.   Infectious Disease: Pt started on zosyn empirically. Will give vanc for procedure prophy given lack of MRSA swab, stop zosyn.   Musculoskeletal: Pulses in all extremities except LLE foot, where the DPC is nonfunctional. Vascular is involved and following. Will trend CKs    Lines/Tubes/Drains: R radial chaz, Rij MAC, tillman, Fem Fem VA EMCO, R groin PAC/impella    Mechanical Circulatory Support: ECMO:     Most Recent Range Past 24hrs   Flow 3.4 Patient Flow (L/min)  Min: 3.2  Max: 3.51   Speed   No data recorded   Sweep   No data recorded     Impella:      Most Recent Range Past 24hrs   Performance Level 0 P Level  Min: 0   Min taken time: 06/09/24 0800  Max: 9   Max taken time: 06/08/24 2300   Flow (L/min) 0 Flow (L/min)  Min: 0   Min taken time: 06/09/24 0800  Max: 3.6   Max taken time: 06/08/24 2200   Motor Current 0/0 Motor Current  Min: 0/0   Min taken time: 06/09/24 0800  Max: 960/788   Max taken time: 06/08/24 1700   Placement Signal  (clotted off)  Placement OK could not be evaluated. This SmartLink does not work with rows of the type: Custom List   Purge (mmHg) 560 Purge Pressure (mmHg)  Min: 541   Min taken time: 06/08/24 2000  Max: 594   Max taken time: 06/08/24 1700   Purge rate (mL/hr) 7 Purge Rate (mL/hr)  Min: 6.1   Min taken time: 06/08/24 1600  Max: 7   Max taken time: 06/09/24 0800         Prophylaxis: PPI BID, SQH, SCDs  Med to Discontinue: Nitroglycerin    Disposition: CTICU    ABCDEF Checklist  Analgesia: Spontaneous awakening trial to be pursued if clinically appropriate. RASS goal reviewed  Breathing: Spontaneous breathing trial to be pursued if clinically appropriate. Mechanical power of assisted ventilation reviewed  Choice of analgesia/sedation: Analgesic and sedative agents adjusted per clinical context.   Delirium assessed by CAM, will avoid exacerbating factors  Early mobility and  exercise: Physical and occupational therapy engaged  Family: Plan of care, overall trajectory of patient shared with family. Questions elicited and answered as appropriate.       Due to the high probability of life threatening clinical decompensation, the patient required critical care time evaluating and managing this patient.  Critical care time included obtaining a history, examining the patient, ordering and reviewing studies, discussing, developing, and implementing a management plan, evaluating the patient's response to treatment, and discussion with other care team providers. I saw and evaluated the patient myself.  Critical care time was performed exclusive of billable procedures.    Critical care time: 58            Christopher Leong MD

## 2024-06-09 NOTE — PROCEDURES
ECMO CANNULATION:     DATE of PROCEDURE:  June 9, 2024    Preprocedure diagnosis: Cardiogenic shock status post PCI of LAD in the setting of myocardial infarction    Postprocedure diagnosis: Same as above    Procedure:  1.  Direct dynamic ultrasound-guided access to the left common femoral artery in an antegrade fashion with a 8 Martiniquais wire reinforced cannula  2.  Direct dynamic ultrasound-guided access to the left common femoral artery in a retrograde fashion with a 17 Martiniquais cannula  3.  Direct dynamic ultrasound-guided access to the left common femoral vein in a retrograde fashion with a 23 Martiniquais cannula.  4.  Initiation of extracorporeal membranous oxygenator    Surgeon: Regis Bolden MD; Stanislaw De La Torre SA.    Anesthesia: Versed 2 mg    Indications for procedure: This patient is a 64-year-old man who underwent colonoscopy this past Thursday.  He never really felt quite right, and complained to his family of having an elephant sitting on his chest.  He slept most of the day Friday, he presented to the local emergency department on Saturday after having nausea sweats and chest pain with shortness of breath.  His troponin was markedly elevated he was taken to the heart catheterization laboratory where he had reintervention to a prior placed percutaneous stent in the left anterior descending coronary artery.  An Impella was placed, the patient did arrest during this procedure he was then life flighted to [facility].  Over the course of the day he has become increasingly requiring more vasoactive agents and his lactate is now starting to rise and he has become markedly tachycardic.  A shock call was initiated and the consensus of the shock team was to offer him venoarterial extracorporeal membranous oxygenation.    Procedure details:  Informed consent was obtained by the family and eldest daughter signed the consent form I had a nice discussion with him regarding the risk and  benefits of anticoagulation, vascular access injury, stroke, renal failure requiring dialysis up to including death.    Patient was then prepped and draped in standard sterile surgical fashion, using direct dynamic ultrasound guidance a micropuncture needle was used to access the left common femoral artery in an antegrade fashion.  A microwire was placed.  A micro catheter was placed and used to exchange 0.35 guidewire.  This 035 guidewire was then used to place an 8 Ukrainian wire reinforced catheter.  Next an 18-gauge needle was used to access the left common femoral artery in a retrograde fashion directly and dynamically.  Next the left common femoral vein was accessed using an 18-gauge needle and 0.35 guidewire was placed.  At this point after having all access we then heparinized the patient with 7000 units of heparin.    The skin and subcutaneous tissue and femoral artery were dilated to 16 Ukrainian, the 17 Ukrainian arterial cannula was then placed over the guidewire and sutured into place.  Next attention was placed to the left common femoral vein the 23 Ukrainian cannula was then placed over the guidewire after serial dilation of the skin and soft venous tissue and the vein up to 26 Ukrainian.  ECMO circuitry lines were brought to the field, the venous and arterial cannulas were connected to the ECMO circuit ensuring that no air was entrained into the system.  The patient then had initiation of extracorporeal membranous oxygenation.  The antegrade distal limb perfusion catheter was de-aired, this was also connected to the sideport on the return arterial cannula.    Patient tolerated this procedure well, there were no complications, abdominal and chest x-ray verified excellent position of cannula.  I updated the family, and prepared him for transfer to the cardiothoracic surgery intensive care unit.

## 2024-06-09 NOTE — SIGNIFICANT EVENT
Update and CODE STATUS:  Briefly, 64-year-old male who suffered an occlusion myocardial infarction due to in-stent thrombosis following cessation of dual antiplatelet therapy for an outpatient colonoscopy.  Patient's acute coronary syndrome was collocated by cardiogenic shock requiring initially a percutaneous Impella CP and then due to worsening hemodynamics and VA ECMO.  There was concern for right lower extremity ischemia following ECMO placement.  Distal perfusion cannula was placed though there was concern for bleeding at that site.  Echocardiogram prior to VA ECMO showed concern for LV thrombus and Impella was nonfunctional due to thrombus.  This Impella, that was placed in the right lower extremity was also pulled as it was a greater risk of LV thrombus aggregation and had no benefit the patient.  Upon pulling patient lost pulses to the right lower extremity.  Vascular surgery had already been following for the ischemia in the left leg and reconvened for now given the right leg.  They deemed that both legs were likely nonviable and would not benefit from intervention.  As such both legs  will likely die.  Given patient's decompensated heart failure his recently diagnosed colonic mass and now his bilateral lower extremity ischemic insult patient will likely die from these insults.  I called patient's daughter Talisha and communicated my worry that his medical challenges would likely lead to his death.  Carmen asserted that her father would not want long-term life-sustaining measures.  I told Jeremy that my recommendation would be to convert her father to a DO NOT RESUSCITATE given that if his heart were to stop given his multiple medical problems resuscitation would unlikely lead to restoration of his minimally acceptable outcome.  Patient will be made DNR with likely transition to comfort care over the next 24 to 48 hours.  Family is going to gather to say goodbye

## 2024-06-09 NOTE — PROCEDURES
Procedures   Bronchoscopy  .Emergent situation with desaturation requiring bronch,      At this point, a bronchoscope was inserted via the existing 7.5  ETT .  The bronchoscope was then advanced to the right upper lobe and the bronchoscope was wedged into the apical segment.  Bronchoscope was then pulled back at the level of the tavon and advanced into the right middle lobe.  Bronchoscope was then pulled back and then advanced into the right lower lobe.  There were no significant abnormalities in any segment.  There was significant thin fluid consistent with pulmonary edema.  All areas were inspected for any acute hemorrhage, but none were seen.  The bronchoscope was withdrawn.    The scope was then directed into the left side, where the left main bronchus was normal.  Left lower lobe bronchus with its sub segments was inspected, there was think fluid consistent with pulmonary edema.  The bronchoscope was then pulled back to the level of the tavon, and advanced into the left upper lobe bronchus, which was noted to have no significant amount of mucous secretions.  All areas were inspected for hemorrhage, none apparent.  The bronchoscope was withdrawn.      The patient tolerated the procedure well.  There were no apparent complications related to the procedure.    Chest x-ray ordered.

## 2024-06-09 NOTE — CONSULTS
Reason For Consult  Concern for LLE expanding hematoma s/p ECMO    History Of Present Illness  Ranjit Zurita is a 64 y.o. male with PMHx of CAD c/b MI (PCI 12/2023 JOE RCA, 10/2017 JOE pLAD), carotid atherosclerosis s/p stenting & balloon angioplasty 2013, PVD s/p iliac stent 2014, DLD, HTN, remote CVA who was transferred from [facility] with concern for cardiogenic shock s/p PCI of LAD in the setting of MI. Vascular Surgery was consulted with concern for expanding LLE hematoma at ECMO cannulation site.    Per EMR: Patient underwent colonoscopy 6/6 and had complaints of chest pressure after. Presented to [facility] ED 6/8 for chest pain, SOB, diaphoresis, and nausea. Found to have troponins >100k, STEMI iso stopping ASA/Plavix for 7 days prior to 6/7/24 colonoscopy. Patient taken emergently to Cath lab, found to have instent thrombosis of LAD. Had reintervention and an Impella was placed, during which he arrested <1 min. Patient transferred post LAD revascularization to [facility] C[facility] on 6/8/24 with Impella in place and mechanically ventilated. Became more tachycardic with increasing lactate and pressure support. S/p ECMO cannulation 6/9 to left common fem.    Several hours after the procedure, patient became hypotensive w/ MAPS 40s and increasing pressor requirements. Concern for expanding hematoma of LLE. Patient given 15u pRBCs, 12u FFP, and 1u platelets at this point without appropriate improvement in hemodynamics or Hgb (5.6 to 6.4).     Past Medical History  He has a past medical history of Adenomatous polyp of ascending colon (03/12/2024), Atherosclerotic heart disease of native coronary artery without angina pectoris, CAD S/P percutaneous coronary angioplasty (10/13/2023), Carotid atherosclerosis (10/13/2023), CKD (chronic kidney disease), stage III (Multi), Encounter for screening for malignant neoplasm of colon, History of transient ischemic attack (10/13/2023), Hyperlipidemia, Hypertension, Intermittent  "claudication (CMS-HCC) (10/13/2023), Iron deficiency anemia due to chronic blood loss (03/12/2024), Neuralgia and neuritis, unspecified, PAD (peripheral artery disease) (CMS-HCC) (10/13/2023), and Subclavian artery stenosis, left (CMS-HCC) (05/08/2024).    Surgical History  He has a past surgical history that includes Other surgical history (05/28/2019); Cardiac catheterization (N/A, 12/05/2023); and Cardiac catheterization (N/A, 12/08/2023).     Social History  He reports that he has been smoking cigarettes. He has never used smokeless tobacco. He reports that he does not currently use alcohol. He reports that he does not use drugs.    Family History  Family History   Problem Relation Name Age of Onset    Breast cancer Mother      Hypertension Father      Heart failure Father          Allergies  Cyclobenzaprine and Losartan    Review of Systems  Unable to obtain due to intubation/sedation/paralyzation      Physical Exam  Physical Exam  Constitutional: ill appearing  HEENT: ETT in place, NGT in place with dark sanguinous output  Cardiac: RRR, on levo 0.2, vaso 0.04  Pulmonary: mechanically ventilated  Abdomen: distended but compressible  GI: indwelling tillman  Extremities: L common fem AV cannula, R fem Impella, LLE thigh hematoma but soft and compressible  Neuro: sedated, paralyzed  Skin: pale, cool, dry  Vascular: unable to doppler signals in b/l DP, PT, radial     Last Recorded Vitals  Blood pressure (!) 50/28, pulse 84, temperature 35.6 °C (96.1 °F), temperature source Temporal, resp. rate 20, height 1.83 m (6' 0.05\"), weight 85.6 kg (188 lb 11.4 oz), SpO2 95%.    Vent Mode: Volume control/assist control  FiO2 (%):  [40 %-80 %] 40 %  S RR:  [14-20] 20  S VT:  [480 mL] 480 mL  PEEP/CPAP (cm H2O):  [8 cm H20] 8 cm H20  MAP (cm H2O):  [7.7-16] 16    Relevant Results  Scheduled medications  albumin human, , ,   albumin human, , ,   amiodarone, 150 mg, intravenous, Once  artificial tears (hypromellose), 2 drop, Both " Eyes, q6h  aspirin, 81 mg, oral, Daily  atorvastatin, 80 mg, oral, Nightly  calcium chloride, , ,   calcium chloride, , ,   cisatracurium, 0.1 mg/kg, intravenous, Once  EPINEPHrine HCl (PF), , ,   fentaNYL, 25 mcg, intravenous, Once  heparin, , ,   nitroglycerin in 5 % dextrose, , ,   oxygen, , inhalation, Continuous - Inhalation  pantoprazole, 40 mg, intravenous, Daily before breakfast  piperacillin-tazobactam, 4.5 g, intravenous, q6h  polyethylene glycol, 17 g, oral, Daily  ticagrelor, 90 mg, oral, BID      Continuous medications  amiodarone, 0.5-1 mg/min, Last Rate: 1 mg/min (06/08/24 2311)  cisatracurium, 0.5-10 mcg/kg/min (Ideal)  fentaNYL, 0-300 mcg/hr, Last Rate: 75 mcg/hr (06/08/24 2223)  heparin, 0-4,000 Units/hr, Last Rate: Stopped (06/08/24 2120)  norepinephrine, 0.01-0.5 mcg/kg/min, Last Rate: 0.3 mcg/kg/min (06/08/24 2248)  propofol, 5-50 mcg/kg/min, Last Rate: 30 mcg/kg/min (06/09/24 0048)  sodium bicarbonate infusion Impella Purge 50 mEq/1000 mL D5W, 10 mL/hr      PRN medications  PRN medications: albumin human, albumin human, calcium chloride, calcium chloride, cisatracurium, EPINEPHrine HCl (PF), fentaNYL, heparin, nitroglycerin in 5 % dextrose    Results for orders placed or performed during the hospital encounter of 06/08/24 (from the past 24 hour(s))   Blood Gas Venous Full Panel   Result Value Ref Range    POCT pH, Venous 7.38 7.33 - 7.43 pH    POCT pCO2, Venous 44 41 - 51 mm Hg    POCT pO2, Venous 237 (H) 35 - 45 mm Hg    POCT SO2, Venous 100 (H) 45 - 75 %    POCT Oxy Hemoglobin, Venous 97.9 (H) 45.0 - 75.0 %    POCT Hematocrit Calculated, Venous 32.0 (L) 41.0 - 52.0 %    POCT Sodium, Venous 131 (L) 136 - 145 mmol/L    POCT Potassium, Venous 5.4 (H) 3.5 - 5.3 mmol/L    POCT Chloride, Venous 100 98 - 107 mmol/L    POCT Ionized Calicum, Venous 1.13 1.10 - 1.33 mmol/L    POCT Glucose, Venous 151 (H) 74 - 99 mg/dL    POCT Lactate, Venous 1.5 0.4 - 2.0 mmol/L    POCT Base Excess, Venous 0.6 -2.0 -  3.0 mmol/L    POCT HCO3 Calculated, Venous 26.0 22.0 - 26.0 mmol/L    POCT Hemoglobin, Venous 10.5 (L) 13.5 - 17.5 g/dL    POCT Anion Gap, Venous 10.0 10.0 - 25.0 mmol/L    Patient Temperature 37.0 degrees Celsius    FiO2 80 %   Troponin I, High Sensitivity   Result Value Ref Range    Troponin I, High Sensitivity >125,000 (HH) 0 - 53 ng/L   Calcium, Ionized   Result Value Ref Range    POCT Calcium, Ionized 1.06 (L) 1.1 - 1.33 mmol/L   Lactate   Result Value Ref Range    Lactate 1.5 0.4 - 2.0 mmol/L   Magnesium   Result Value Ref Range    Magnesium 1.93 1.60 - 2.40 mg/dL   Coagulation Screen   Result Value Ref Range    Protime 16.1 (H) 9.8 - 12.8 seconds    INR 1.4 (H) 0.9 - 1.1    aPTT >200 (HH) 27 - 38 seconds   Hepatic Function Panel   Result Value Ref Range    Albumin 3.5 3.4 - 5.0 g/dL    Bilirubin, Total 0.9 0.0 - 1.2 mg/dL    Bilirubin, Direct 0.2 0.0 - 0.3 mg/dL    Alkaline Phosphatase 78 33 - 136 U/L     (H) 10 - 52 U/L     (H) 9 - 39 U/L    Total Protein 5.5 (L) 6.4 - 8.2 g/dL   Type And Screen   Result Value Ref Range    ABO TYPE O     Rh TYPE POS     ANTIBODY SCREEN NEG    Phosphorus   Result Value Ref Range    Phosphorus 3.8 2.5 - 4.9 mg/dL   Basic Metabolic Panel   Result Value Ref Range    Glucose 151 (H) 74 - 99 mg/dL    Sodium 133 (L) 136 - 145 mmol/L    Potassium 5.1 3.5 - 5.3 mmol/L    Chloride 99 98 - 107 mmol/L    Bicarbonate 26 21 - 32 mmol/L    Anion Gap 13 10 - 20 mmol/L    Urea Nitrogen 27 (H) 6 - 23 mg/dL    Creatinine 1.52 (H) 0.50 - 1.30 mg/dL    eGFR 51 (L) >60 mL/min/1.73m*2    Calcium 8.2 (L) 8.6 - 10.6 mg/dL   ACTIVATED CLOTTING TIME LOW   Result Value Ref Range    POCT Activated Clotting Time Low Range 260 (H) 83 - 199 sec   BLOOD GAS MIXED VENOUS   Result Value Ref Range    POCT pH, Mixed 7.33 7.33 - 7.43 pH    POCT pCO2, Mixed 53 (H) 41 - 51 mm Hg    POCT pO2, Mixed 36 35 - 45 mm Hg    POCT SO2, Mixed 52 45 - 75 %    POCT Oxy Hemoglobin, Mixed 51.6 45.0 - 75.0 %     POCT Base Excess, Mixed 1.0 -2.0 - 3.0 mmol/L    POCT HCO3 Calculated, Mixed 27.9 (H) 22.0 - 26.0 mmol/L    Patient Temperature 37.0 degrees Celsius    FiO2 60 %   ACTIVATED CLOTTING TIME LOW   Result Value Ref Range    POCT Activated Clotting Time Low Range 158 83 - 199 sec   Blood Gas Arterial   Result Value Ref Range    POCT pH, Arterial 7.37 (L) 7.38 - 7.42 pH    POCT pCO2, Arterial 41 38 - 42 mm Hg    POCT pO2, Arterial 163 (H) 85 - 95 mm Hg    POCT SO2, Arterial 100 94 - 100 %    POCT Oxy Hemoglobin, Arterial 97.3 94.0 - 98.0 %    POCT Base Excess, Arterial -1.5 -2.0 - 3.0 mmol/L    POCT HCO3 Calculated, Arterial 23.7 22.0 - 26.0 mmol/L    Patient Temperature 37.0 degrees Celsius    FiO2 60 %   Lactate dehydrogenase   Result Value Ref Range    LDH 1,465 (H) 84 - 246 U/L   Lactate   Result Value Ref Range    Lactate 1.7 0.4 - 2.0 mmol/L   Heparin Assay, UFH   Result Value Ref Range    Heparin Unfractionated 0.2 See Comment Below for Therapeutic Ranges IU/mL   CBC   Result Value Ref Range    WBC 28.8 (H) 4.4 - 11.3 x10*3/uL    nRBC 0.1 (H) 0.0 - 0.0 /100 WBCs    RBC 3.02 (L) 4.50 - 5.90 x10*6/uL    Hemoglobin 8.6 (L) 13.5 - 17.5 g/dL    Hematocrit 27.4 (L) 41.0 - 52.0 %    MCV 91 80 - 100 fL    MCH 28.5 26.0 - 34.0 pg    MCHC 31.4 (L) 32.0 - 36.0 g/dL    RDW 13.7 11.5 - 14.5 %    Platelets 213 150 - 450 x10*3/uL   Blood Gas Venous Full Panel   Result Value Ref Range    POCT pH, Venous 7.32 (L) 7.33 - 7.43 pH    POCT pCO2, Venous 47 41 - 51 mm Hg    POCT pO2, Venous 32 (L) 35 - 45 mm Hg    POCT SO2, Venous 46 45 - 75 %    POCT Oxy Hemoglobin, Venous 45.6 45.0 - 75.0 %    POCT Hematocrit Calculated, Venous 25.0 (L) 41.0 - 52.0 %    POCT Sodium, Venous 135 (L) 136 - 145 mmol/L    POCT Potassium, Venous 4.4 3.5 - 5.3 mmol/L    POCT Chloride, Venous 104 98 - 107 mmol/L    POCT Ionized Calicum, Venous 1.11 1.10 - 1.33 mmol/L    POCT Glucose, Venous 149 (H) 74 - 99 mg/dL    POCT Lactate, Venous 2.2 (H) 0.4 - 2.0 mmol/L     POCT Base Excess, Venous -1.9 -2.0 - 3.0 mmol/L    POCT HCO3 Calculated, Venous 24.2 22.0 - 26.0 mmol/L    POCT Hemoglobin, Venous 8.4 (L) 13.5 - 17.5 g/dL    POCT Anion Gap, Venous 11.0 10.0 - 25.0 mmol/L    Patient Temperature 37.0 degrees Celsius    FiO2 40 %   BLOOD GAS ARTERIAL FULL PANEL   Result Value Ref Range    POCT pH, Arterial 7.36 (L) 7.38 - 7.42 pH    POCT pCO2, Arterial 39 38 - 42 mm Hg    POCT pO2, Arterial 92 85 - 95 mm Hg    POCT SO2, Arterial 99 94 - 100 %    POCT Oxy Hemoglobin, Arterial 96.3 94.0 - 98.0 %    POCT Hematocrit Calculated, Arterial 27.0 (L) 41.0 - 52.0 %    POCT Sodium, Arterial 133 (L) 136 - 145 mmol/L    POCT Potassium, Arterial 4.6 3.5 - 5.3 mmol/L    POCT Chloride, Arterial 103 98 - 107 mmol/L    POCT Ionized Calcium, Arterial 1.13 1.10 - 1.33 mmol/L    POCT Glucose, Arterial 153 (H) 74 - 99 mg/dL    POCT Lactate, Arterial 2.4 (H) 0.4 - 2.0 mmol/L    POCT Base Excess, Arterial -3.2 (L) -2.0 - 3.0 mmol/L    POCT HCO3 Calculated, Arterial 22.0 22.0 - 26.0 mmol/L    POCT Hemoglobin, Arterial 8.9 (L) 13.5 - 17.5 g/dL    POCT Anion Gap, Arterial 13 10 - 25 mmo/L    Patient Temperature 37.0 degrees Celsius    FiO2 40 %   Lactate   Result Value Ref Range    Lactate 2.7 (H) 0.4 - 2.0 mmol/L   Blood Gas Lactic Acid, Venous   Result Value Ref Range    POCT Lactate, Venous 1.5 0.4 - 2.0 mmol/L   Lactate   Result Value Ref Range    Lactate 1.6 0.4 - 2.0 mmol/L   Blood Gas Arterial Full Panel Unsolicited   Result Value Ref Range    POCT pH, Arterial 7.38 7.38 - 7.42 pH    POCT pCO2, Arterial 34 (L) 38 - 42 mm Hg    POCT pO2, Arterial 305 (H) 85 - 95 mm Hg    POCT SO2, Arterial 99 94 - 100 %    POCT Oxy Hemoglobin, Arterial 97.8 94.0 - 98.0 %    POCT Hematocrit Calculated, Arterial 19.0 (L) 41.0 - 52.0 %    POCT Sodium, Arterial 131 (L) 136 - 145 mmol/L    POCT Potassium, Arterial 4.6 3.5 - 5.3 mmol/L    POCT Chloride, Arterial 104 98 - 107 mmol/L    POCT Ionized Calcium, Arterial 1.09 (L)  1.10 - 1.33 mmol/L    POCT Glucose, Arterial 200 (H) 74 - 99 mg/dL    POCT Lactate, Arterial 3.1 (H) 0.4 - 2.0 mmol/L    POCT Base Excess, Arterial -4.6 (L) -2.0 - 3.0 mmol/L    POCT HCO3 Calculated, Arterial 20.1 (L) 22.0 - 26.0 mmol/L    POCT Hemoglobin, Arterial 6.3 (LL) 13.5 - 17.5 g/dL    POCT Anion Gap, Arterial 12 10 - 25 mmo/L    Patient Temperature 37.0 degrees Celsius   Prepare RBC: 3 Units   Result Value Ref Range    PRODUCT CODE D9596P93     Unit Number [id number]     Unit ABO O     Unit RH POS     XM INTEP COMP     Dispense Status IS     Blood Expiration Date June 27, 2024 23:59 EDT     PRODUCT BLOOD TYPE 5100     UNIT VOLUME 350     PRODUCT CODE C2005P07     Unit Number [id number]     Unit ABO O     Unit RH POS     XM INTEP COMP     Dispense Status IS     Blood Expiration Date June 29, 2024 23:59 EDT     PRODUCT BLOOD TYPE 5100     UNIT VOLUME 350     PRODUCT CODE D5165U31     Unit Number [id number]     Unit ABO O     Unit RH POS     XM INTEP COMP     Dispense Status IS     Blood Expiration Date June 27, 2024 23:59 EDT     PRODUCT BLOOD TYPE 5100     UNIT VOLUME 279    ABO/Rh   Result Value Ref Range    ABO TYPE O     Rh TYPE POS    TEG Clot Global Profile   Result Value Ref Range    R (Reaction Time) K >17.0 (H) 4.6 - 9.1 min    K (Clot Kinetics) >5.0 (H) 0.8 - 2.1 min    ANGLE 44.0 (L) 63.0 - 78.0 deg    MA (Max Amplitude) K 45.0 (L) 52.0 - 69.0 mm    R (Reaction Time) KH 11.7 (H) 4.3 - 8.3 min    MA (Max Amplitude) RT 56.0 52.0 - 70.0 mm    MA ( Iván Amplitude) FF 16.0 15.0 - 32.0 mm    FLEV 296 278 - 581 mg/dL   Blood Gas Arterial Full Panel Unsolicited   Result Value Ref Range    POCT pH, Arterial 7.38 7.38 - 7.42 pH    POCT pCO2, Arterial 32 (L) 38 - 42 mm Hg    POCT pO2, Arterial 201 (H) 85 - 95 mm Hg    POCT SO2, Arterial 100 94 - 100 %    POCT Oxy Hemoglobin, Arterial 96.6 94.0 - 98.0 %    POCT Hematocrit Calculated, Arterial 12.0 (L) 41.0 - 52.0 %    POCT Sodium, Arterial 132  (L) 136 - 145 mmol/L    POCT Potassium, Arterial 4.9 3.5 - 5.3 mmol/L    POCT Chloride, Arterial 105 98 - 107 mmol/L    POCT Ionized Calcium, Arterial 1.23 1.10 - 1.33 mmol/L    POCT Glucose, Arterial 209 (H) 74 - 99 mg/dL    POCT Lactate, Arterial 5.6 (HH) 0.4 - 2.0 mmol/L    POCT Base Excess, Arterial -5.8 (L) -2.0 - 3.0 mmol/L    POCT HCO3 Calculated, Arterial 18.9 (L) 22.0 - 26.0 mmol/L    POCT Hemoglobin, Arterial 4.0 (LL) 13.5 - 17.5 g/dL    POCT Anion Gap, Arterial 13 10 - 25 mmo/L    Patient Temperature 37.0 degrees Celsius   Prepare RBC   Result Value Ref Range    PRODUCT CODE C5608C05     Unit Number [id number]     Unit ABO O     Unit RH POS     XM INTEP COMP     Dispense Status IS     Blood Expiration Date June 26, 2024 23:59 EDT     PRODUCT BLOOD TYPE 5100     UNIT VOLUME 350     PRODUCT CODE P4330O48     Unit Number [id number]     Unit ABO O     Unit RH POS     XM INTEP COMP     Dispense Status IS     Blood Expiration Date June 26, 2024 23:59 EDT     PRODUCT BLOOD TYPE 5100     UNIT VOLUME 350     PRODUCT CODE X6805S50     Unit Number [id number]     Unit ABO O     Unit RH POS     XM INTEP COMP     Dispense Status IS     Blood Expiration Date June 26, 2024 23:59 EDT     PRODUCT BLOOD TYPE 5100     UNIT VOLUME 277     PRODUCT CODE X5086E59     Unit Number [id number]     Unit ABO O     Unit RH POS     XM INTEP COMP     Dispense Status IS     Blood Expiration Date June 28, 2024 23:59 EDT     PRODUCT BLOOD TYPE 5100     UNIT VOLUME 283     PRODUCT CODE P5053R92     Unit Number [id number]     Unit ABO O     Unit RH POS     XM INTEP COMP     Dispense Status IS     Blood Expiration Date June 30, 2024 23:59 EDT     PRODUCT BLOOD TYPE 5100     UNIT VOLUME 282    Prepare Plasma   Result Value Ref Range    PRODUCT CODE L6103F84     Unit Number [id number]     Unit ABO O     Unit RH POS     Dispense Status IS     Blood Expiration Date June 13, 2024 23:20 EDT     PRODUCT BLOOD TYPE 5100      UNIT VOLUME 383     PRODUCT CODE B9320T01     Unit Number [id number]     Unit ABO O     Unit RH POS     Dispense Status IS     Blood Expiration Date June 13, 2024 23:20 EDT     PRODUCT BLOOD TYPE 5100     UNIT VOLUME 237     PRODUCT CODE W3199G65     Unit Number [id number]     Unit ABO O     Unit RH POS     Dispense Status IS     Blood Expiration Date June 13, 2024 23:20 EDT     PRODUCT BLOOD TYPE 5100     UNIT VOLUME 204     PRODUCT CODE D1467Y72     Unit Number [id number]     Unit ABO O     Unit RH POS     Dispense Status IS     Blood Expiration Date June 13, 2024 23:20 EDT     PRODUCT BLOOD TYPE 5100     UNIT VOLUME 220     PRODUCT CODE H7857L79     Unit Number [id number]     Unit ABO O     Unit RH POS     Dispense Status IS     Blood Expiration Date June 13, 2024 23:20 EDT     PRODUCT BLOOD TYPE 5100     UNIT VOLUME 217    CBC   Result Value Ref Range    WBC 13.1 (H) 4.4 - 11.3 x10*3/uL    nRBC 0.3 (H) 0.0 - 0.0 /100 WBCs    RBC 1.84 (L) 4.50 - 5.90 x10*6/uL    Hemoglobin 5.3 (LL) 13.5 - 17.5 g/dL    Hematocrit 16.8 (L) 41.0 - 52.0 %    MCV 91 80 - 100 fL    MCH 28.8 26.0 - 34.0 pg    MCHC 31.5 (L) 32.0 - 36.0 g/dL    RDW 14.5 11.5 - 14.5 %    Platelets 98 (L) 150 - 450 x10*3/uL   Heparin Assay, UFH   Result Value Ref Range    Heparin Unfractionated 0.1 See Comment Below for Therapeutic Ranges IU/mL   Renal function panel   Result Value Ref Range    Glucose 230 (H) 74 - 99 mg/dL    Sodium 135 (L) 136 - 145 mmol/L    Potassium 5.3 3.5 - 5.3 mmol/L    Chloride 105 98 - 107 mmol/L    Bicarbonate 16 (L) 21 - 32 mmol/L    Anion Gap 19 10 - 20 mmol/L    Urea Nitrogen 42 (H) 6 - 23 mg/dL    Creatinine 1.90 (H) 0.50 - 1.30 mg/dL    eGFR 39 (L) >60 mL/min/1.73m*2    Calcium 7.9 (L) 8.6 - 10.6 mg/dL    Phosphorus 4.5 2.5 - 4.9 mg/dL    Albumin 2.7 (L) 3.4 - 5.0 g/dL   Coagulation Screen   Result Value Ref Range    Protime 24.1 (H) 9.8 - 12.8 seconds    INR 2.1 (H) 0.9 - 1.1    aPTT 95 (H) 27 - 38  seconds   Magnesium   Result Value Ref Range    Magnesium 1.51 (L) 1.60 - 2.40 mg/dL   Calcium, ionized   Result Value Ref Range    POCT Calcium, Ionized 1.07 (L) 1.1 - 1.33 mmol/L   Prepare Platelets   Result Value Ref Range    PRODUCT CODE I4591Q90     Unit Number [id number]     Unit ABO O     Unit RH NEG     Dispense Status IS     Blood Expiration Date June 09, 2024 23:59 EDT     PRODUCT BLOOD TYPE 9500     UNIT VOLUME 350     PRODUCT CODE M2698P19     Unit Number [id number]     Unit ABO O     Unit RH POS     Dispense Status IS     Blood Expiration Date June 11, 2024 23:59 EDT     PRODUCT BLOOD TYPE 5100     UNIT VOLUME 220    Prepare RBC   Result Value Ref Range    PRODUCT CODE T1210P22     Unit Number [id number]     Unit ABO O     Unit RH POS     XM INTEP COMP     Dispense Status IS     Blood Expiration Date June 26, 2024 23:59 EDT     PRODUCT BLOOD TYPE 5100     UNIT VOLUME 350     PRODUCT CODE H8986T03     Unit Number [id number]     Unit ABO O     Unit RH POS     XM INTEP COMP     Dispense Status IS     Blood Expiration Date June 30, 2024 23:59 EDT     PRODUCT BLOOD TYPE 5100     UNIT VOLUME 350     PRODUCT CODE Z7642U43     Unit Number [id number]     Unit ABO O     Unit RH POS     XM INTEP COMP     Dispense Status IS     Blood Expiration Date June 28, 2024 23:59 EDT     PRODUCT BLOOD TYPE 5100     UNIT VOLUME 283     PRODUCT CODE G7786R32     Unit Number [id number]     Unit ABO O     Unit RH POS     XM INTEP COMP     Dispense Status IS     Blood Expiration Date June 28, 2024 23:59 EDT     PRODUCT BLOOD TYPE 5100     UNIT VOLUME 290     PRODUCT CODE T5264W75     Unit Number [id number]     Unit ABO O     Unit RH POS     XM INTEP COMP     Dispense Status IS     Blood Expiration Date June 28, 2024 23:59 EDT     PRODUCT BLOOD TYPE 5100     UNIT VOLUME 278    Prepare Plasma   Result Value Ref Range    PRODUCT CODE L7197Z05     Unit Number [id number]     Unit ABO O     Unit  RH POS     Dispense Status IS     Blood Expiration Date June 13, 2024 23:20 EDT     PRODUCT BLOOD TYPE 5100     UNIT VOLUME 220     PRODUCT CODE Z0449D16     Unit Number [id number]     Unit ABO O     Unit RH POS     Dispense Status IS     Blood Expiration Date June 13, 2024 23:20 EDT     PRODUCT BLOOD TYPE 5100     UNIT VOLUME 221     PRODUCT CODE W0325F09     Unit Number [id number]     Unit ABO O     Unit RH POS     Dispense Status IS     Blood Expiration Date June 12, 2024 12:08 EDT     PRODUCT BLOOD TYPE 5100     UNIT VOLUME 318     PRODUCT CODE L2653U97     Unit Number [id number]     Unit ABO A     Unit RH POS     Dispense Status IS     Blood Expiration Date June 14, 2024 00:45 EDT     PRODUCT BLOOD TYPE 6200     UNIT VOLUME 305     PRODUCT CODE N2162N04     Unit Number [id number]     Unit ABO A     Unit RH NEG     Dispense Status IS     Blood Expiration Date June 14, 2024 00:45 EDT     PRODUCT BLOOD TYPE 0600     UNIT VOLUME 335    Blood Gas Arterial Full Panel Unsolicited   Result Value Ref Range    POCT pH, Arterial 7.28 (L) 7.38 - 7.42 pH    POCT pCO2, Arterial 28 (L) 38 - 42 mm Hg    POCT pO2, Arterial 205 (H) 85 - 95 mm Hg    POCT SO2, Arterial 100 94 - 100 %    POCT Oxy Hemoglobin, Arterial 98.4 (H) 94.0 - 98.0 %    POCT Hematocrit Calculated, Arterial 20.0 (L) 41.0 - 52.0 %    POCT Sodium, Arterial 135 (L) 136 - 145 mmol/L    POCT Potassium, Arterial 5.4 (H) 3.5 - 5.3 mmol/L    POCT Chloride, Arterial 107 98 - 107 mmol/L    POCT Ionized Calcium, Arterial 0.47 (LL) 1.10 - 1.33 mmol/L    POCT Glucose, Arterial 223 (H) 74 - 99 mg/dL    POCT Lactate, Arterial 6.4 (HH) 0.4 - 2.0 mmol/L    POCT Base Excess, Arterial -12.4 (L) -2.0 - 3.0 mmol/L    POCT HCO3 Calculated, Arterial 13.2 (L) 22.0 - 26.0 mmol/L    POCT Hemoglobin, Arterial 6.8 (L) 13.5 - 17.5 g/dL    POCT Anion Gap, Arterial 20 10 - 25 mmo/L    Patient Temperature 37.0 degrees Celsius   Prepare Plasma   Result Value Ref Range     PRODUCT CODE C4288V81     Unit Number [id number]     Unit ABO A     Unit RH POS     Dispense Status IS     Blood Expiration Date June 14, 2024 00:45 EDT     PRODUCT BLOOD TYPE 6200     UNIT VOLUME 218     PRODUCT CODE J3649W05     Unit Number [id number]     Unit ABO A     Unit RH POS     Dispense Status IS     Blood Expiration Date June 14, 2024 00:45 EDT     PRODUCT BLOOD TYPE 6200     UNIT VOLUME 300     PRODUCT CODE D3026P67     Unit Number [id number]     Unit ABO A     Unit RH POS     Dispense Status IS     Blood Expiration Date June 14, 2024 00:45 EDT     PRODUCT BLOOD TYPE 6200     UNIT VOLUME 296     PRODUCT CODE I9512Y20     Unit Number [id number]     Unit ABO A     Unit RH POS     Dispense Status IS     Blood Expiration Date June 14, 2024 00:45 EDT     PRODUCT BLOOD TYPE 6200     UNIT VOLUME 316     PRODUCT CODE F0367O31     Unit Number [id number]     Unit ABO A     Unit RH NEG     Dispense Status IS     Blood Expiration Date June 14, 2024 00:45 EDT     PRODUCT BLOOD TYPE 0600     UNIT VOLUME 288    Prepare Cryoprecipitated AHF (Pooled Units): 4 Pools   Result Value Ref Range    PRODUCT CODE Q0908L85     Unit Number [id number]     Unit ABO O     Unit RH POS     Dispense Status IS     Blood Expiration Date June 09, 2024 06:45 EDT     PRODUCT BLOOD TYPE 5100     UNIT VOLUME 86     PRODUCT CODE D4499U19     Unit Number [id number]     Unit ABO O     Unit RH POS     Dispense Status IS     Blood Expiration Date June 09, 2024 08:55 EDT     PRODUCT BLOOD TYPE 5100     UNIT VOLUME 88     PRODUCT CODE [id number]     Unit Number [id number]     Unit ABO O     Unit RH POS     Dispense Status IS     Blood Expiration Date June 09, 2024 08:55 EDT     PRODUCT BLOOD TYPE 5100     UNIT VOLUME 94     PRODUCT CODE X9506A37     Unit Number [id number]     Unit ABO O     Unit RH POS     Dispense Status IS     Blood Expiration Date June 09, 2024 08:55 EDT     PRODUCT BLOOD TYPE 5100      UNIT VOLUME 91    Prepare RBC   Result Value Ref Range    PRODUCT CODE [id number]     Unit Number [id number]     Unit ABO O     Unit RH POS     XM INTEP COMP     Dispense Status IS     Blood Expiration Date June 26, 2024 23:59 EDT     PRODUCT BLOOD TYPE 5100     UNIT VOLUME 350     PRODUCT CODE [id number]     Unit Number [id number]     Unit ABO O     Unit RH POS     XM INTEP COMP     Dispense Status IS     Blood Expiration Date June 27, 2024 23:59 EDT     PRODUCT BLOOD TYPE 5100     UNIT VOLUME 350     PRODUCT CODE M5324S81     Unit Number [id number]     Unit ABO O     Unit RH POS     XM INTEP COMP     Dispense Status IS     Blood Expiration Date June 30, 2024 23:59 EDT     PRODUCT BLOOD TYPE 5100     UNIT VOLUME 350     PRODUCT CODE J0324Y21     Unit Number [id number]     Unit ABO O     Unit RH POS     XM INTEP COMP     Dispense Status IS     Blood Expiration Date July 07, 2024 23:59 EDT     PRODUCT BLOOD TYPE 5100     UNIT VOLUME 340     PRODUCT CODE H3810E66     Unit Number [id number]     Unit ABO O     Unit RH POS     XM INTEP COMP     Dispense Status IS     Blood Expiration Date June 27, 2024 23:59 EDT     PRODUCT BLOOD TYPE 5100     UNIT VOLUME 340    Prepare RBC   Result Value Ref Range    PRODUCT CODE M1920T37     Unit Number [id number]     Unit ABO O     Unit RH POS     XM INTEP COMP     Dispense Status IS     Blood Expiration Date June 21, 2024 23:59 EDT     PRODUCT BLOOD TYPE 5100     UNIT VOLUME 350     PRODUCT CODE H7018O05     Unit Number [id number]     Unit ABO O     Unit RH POS     XM INTEP COMP     Dispense Status IS     Blood Expiration Date June 26, 2024 23:59 EDT     PRODUCT BLOOD TYPE 5100     UNIT VOLUME 350     PRODUCT CODE D8654A43     Unit Number [id number]     Unit ABO O     Unit RH POS     XM INTEP COMP     Dispense Status IS     Blood Expiration Date June 30, 2024 23:59 EDT     PRODUCT BLOOD TYPE 5100     UNIT VOLUME 350     PRODUCT CODE C4492H63      Unit Number [id number]     Unit ABO O     Unit RH POS     XM INTEP COMP     Dispense Status IS     Blood Expiration Date June 28, 2024 23:59 EDT     PRODUCT BLOOD TYPE 5100     UNIT VOLUME 288     PRODUCT CODE M6055P53     Unit Number [id number]     Unit ABO O     Unit RH POS     XM INTEP COMP     Dispense Status IS     Blood Expiration Date June 28, 2024 23:59 EDT     PRODUCT BLOOD TYPE 5100     UNIT VOLUME 287    Prepare Plasma   Result Value Ref Range    PRODUCT CODE J0059P90     Unit Number [id number]     Unit ABO O     Unit RH POS     Dispense Status IS     Blood Expiration Date June 14, 2024 02:20 EDT     PRODUCT BLOOD TYPE 5100     UNIT VOLUME 225     PRODUCT CODE X2502D98     Unit Number [id number]     Unit ABO O     Unit RH POS     Dispense Status IS     Blood Expiration Date June 14, 2024 02:20 EDT     PRODUCT BLOOD TYPE 5100     UNIT VOLUME 217     PRODUCT CODE G7869S21     Unit Number [id number]     Unit ABO O     Unit RH POS     Dispense Status IS     Blood Expiration Date June 14, 2024 02:20 EDT     PRODUCT BLOOD TYPE 5100     UNIT VOLUME 220         Assessment/Plan   Ranjit Zurita is a 64 y.o. male with PMHx of CAD c/b MI (PCI 12/2023 JOE RCA, 10/2017 JOE pLAD), carotid atherosclerosis s/p stenting & balloon angioplasty 2013, PVD s/p iliac stent 2014, DLD, HTN, remote CVA who was transferred from [facility] with concern for cardiogenic shock s/p PCI of LAD in the setting of MI. Vascular Surgery was consulted with concern for expanding LLE hematoma at ECMO cannulation site.    Proximal LLE remains soft and compressible. Low concern for uncontrolled bleeding into left thigh, as compartment would be tense after 15u of pRBCs. Hemodynamics have since improved. Patient no longer on pressor support.     Recommendations:  - No indication for operative intervention by Vascular Surgery at this time given high risk 2/2 coagulopathy    Seen with Dr. Metz and discussed with   Dominick.    Lola Bahena MD  Vascular Surgery  [id number]

## 2024-06-10 NOTE — SIGNIFICANT EVENT
End of life discussion    I was called to speak with I spoke with Mr. Zurita family, including his next of kin Talisha (daughter). They expressed their desire to withdrew care and let Mr. Zurita go peacefully. I discussed extensively what withdrawing care means and that stopping the ECMO machine will ultimately lead to Mr. Zurita dead. They expressed their understanding on the situation and agreed to move forward. I also spoke about the options we have to make him comfortable during this extremely difficult moment and that family can be at bedside during and after the ECMO machine is turned off. Dr. Bolden was notified about the decision.

## 2024-06-10 NOTE — PROGRESS NOTES
Patient placed emergently on extraoral memberless oxygenation last evening via the left come from larger in common femoral vein. Distal perfusion catheter placed in the left common femoral artery. Approximately four hours later, there was significant swelling in the abdomen, as well as some moderate swelling in the left thigh. This required transfusion of products in a one-to-one fashion. He received balance transfusion as he was bleeding from nose mouth peripheral IVs. I came to the bedside at approximately 3:30 in the morning to evaluate the patient.  Upon my examination, there was some hematoma in the left thigh, although not significant with the amount of ongoing transfusion requirements. The abdomen was moderately distended. The bladder pressure was 11 to 10 mmHg. As the appeared to be ongoing bleeding, and no abdominal compartment syndrome, we elected to continue with extra Kaporal member auction support and balanced transfusion to correct his coagulopathy.  There was a multidisciplinary discussion between the vascular surgery service, the acute care, surgery service, and myself at the bedside we all agreed non-operative management was the safest course at this time.

## 2024-06-10 NOTE — SIGNIFICANT EVENT
Death Within 24 Hours of Soft Wrist Restraint Utilization    Death within 24 hours of soft wrist restraint logged at 6/10/2024 at 11:31 AM.    Stephie Davis RN  Date: 6/10/2024  Time: 11:31 AM

## 2024-06-10 NOTE — DISCHARGE SUMMARY
Discharge Diagnosis  STEMI (ST elevation myocardial infarction) (Multi)    Issues Requiring Follow-Up  NONE    Test Results Pending At Discharge  Pending Labs       Order Current Status    aPTT - baseline Collected (24 0433)    Blood Gas Arterial In process    Blood Gas Arterial In process    Blood Gas Arterial In process    Blood Gas Lactic Acid, Venous In process    Blood Gas Lactic Acid, Venous In process    Blood Gas Lactic Acid, Venous In process            Hospital Course   64 y.o. male with PMHx of CAD c/b MI (PCI 2023 JOE RCA, 10/2017 JOE pLAD), carotid atherosclerosis s/p stenting & balloon angioplasty , PVD s/p iliac stent , DLD, HTN, remote CVA who was transferred from [facility] with concern for cardiogenic shock s/p PCI of LAD in the setting of MI. Patient underwent colonoscopy  and had complaints of chest pressure after. Presented to [facility] ED  for chest pain, SOB, diaphoresis, and nausea. Found to have troponins >100k, STEMI iso stopping ASA/Plavix for 7 days prior to 24 colonoscopy. Patient taken emergently to Cath lab, found to have instent thrombosis of LAD. Had reintervention and an Impella was placed, during which he arrested <1 min. Patient transferred post LAD revascularization to [facility] CICU on 24 with Impella in place and mechanically ventilated. Became more tachycardic with increasing lactate and pressure support. S/p ECMO cannulation  to left common fem. Patient continued to deteriorate with increasing pressors required and massively transfused overnight. Source of bleeding unverified, but controlled when coagulopathy reversed. Impella unable to flow due to large clot on inlet.  When Impella cannula removed, flow to right leg was compromised. Patient code status changed to DNRCCA after family meeting. Family requested withdraw of care. Patient  peacefully with family at bedside on 6/10/2024 at 2:21 am.     Pertinent Physical Exam At Time of  Discharge  Physical Exam  Constitutional: adult male intubated  HEENT: atraumatic, normocephalic, Pupils mid dilation fixed  Chest: No lung sounds bilaterally  Heart: No heart sounds noted on auscultation  GI: abdomen soft  : Mckeon in place  Extremities: No edema noted      Home Medications     Medication List      ASK your doctor about these medications     amLODIPine 10 mg tablet; Commonly known as: Norvasc; Take 0.5 tablets (5   mg) by mouth once daily.   aspirin 81 mg EC tablet   atorvastatin 40 mg tablet; Commonly known as: Lipitor; TAKE 1 TABLET BY   MOUTH EVERY DAY   clopidogrel 75 mg tablet; Commonly known as: Plavix; Take 1 tablet (75   mg) by mouth once daily.   metoprolol tartrate 25 mg tablet; Commonly known as: Lopressor; TAKE 1   TABLET TWICE DAILY   multivitamin tablet   nitroglycerin 0.4 mg SL tablet; Commonly known as: Nitrostat; Place 1   tablet (0.4 mg) under the tongue every 5 minutes if needed for chest pain   (up to 3 doses).       Outpatient Follow-Up  Future Appointments   Date Time Provider Department Center   6/10/2024  3:00 AM [facility] 3 [facility]R [facility]   6/18/2024 10:00 AM Aditya Turner MD [id number] [place]   3/5/2025  1:15 PM [facility] [id number] ECHO/VASC 4 [id number] [facility]   3/12/2025  1:45 PM Mac Lomas MD [id number] [place]       Amando Ortiz MD

## 2024-06-10 NOTE — SIGNIFICANT EVENT
Death Note    I was called to patients bedside to pronounce that patient Mr. Yudith Church has . Family at bedside. No spontaneous movements were present. There was not response to verbal or tactile stimuli. Pupils were mid-dilated and fixed. No breath sounds were appreciated over either lung field. No carotid pulses were palpable.  No heart sounds were auscultated over entire precordium. No gag reflex. Patient pronounced dead at 02:21 am on 6/10/2024. Family and attending were notified. Patient was DNRCCA. Time of dead 02:21 am confirmed and witnessed by Radha Boyer RN.

## 2024-06-11 LAB
BLOOD EXPIRATION DATE: NORMAL
DISPENSE STATUS: NORMAL
PRODUCT BLOOD TYPE: 5100
PRODUCT BLOOD TYPE: 600
PRODUCT BLOOD TYPE: 6200
PRODUCT CODE: NORMAL
UNIT ABO: NORMAL
UNIT NUMBER: NORMAL
UNIT RH: NORMAL
UNIT VOLUME: 216
UNIT VOLUME: 217
UNIT VOLUME: 218
UNIT VOLUME: 220
UNIT VOLUME: 221
UNIT VOLUME: 225
UNIT VOLUME: 249
UNIT VOLUME: 287
UNIT VOLUME: 288
UNIT VOLUME: 288
UNIT VOLUME: 296
UNIT VOLUME: 300
UNIT VOLUME: 316
UNIT VOLUME: 340
UNIT VOLUME: 340
UNIT VOLUME: 350
XM INTEP: NORMAL

## 2024-06-11 RX ORDER — PROPOFOL 10 MG/ML
5-50 INJECTION, EMULSION INTRAVENOUS CONTINUOUS
Status: DISCONTINUED | OUTPATIENT
Start: 2024-06-11 | End: 2024-06-11

## 2024-06-11 RX ORDER — PROPOFOL 10 MG/ML
5-50 INJECTION, EMULSION INTRAVENOUS CONTINUOUS
Status: ACTIVE | OUTPATIENT
Start: 2024-01-01

## 2024-06-11 RX ORDER — NOREPINEPHRINE BITARTRATE/D5W 8 MG/250ML
.01-.5 PLASTIC BAG, INJECTION (ML) INTRAVENOUS CONTINUOUS
Status: DISCONTINUED | OUTPATIENT
Start: 2024-06-11 | End: 2024-06-11

## 2024-06-11 RX ORDER — NOREPINEPHRINE BITARTRATE/D5W 8 MG/250ML
.01-.5 PLASTIC BAG, INJECTION (ML) INTRAVENOUS CONTINUOUS
Status: ACTIVE | OUTPATIENT
Start: 2024-01-01

## 2024-06-12 LAB
BACTERIA BLD CULT: NORMAL
BACTERIA BLD CULT: NORMAL

## 2024-06-16 LAB
ATRIAL RATE: 117 BPM
P AXIS: 71 DEGREES
P OFFSET: 199 MS
P ONSET: 155 MS
PR INTERVAL: 148 MS
Q ONSET: 229 MS
QRS COUNT: 20 BEATS
QRS DURATION: 160 MS
QT INTERVAL: 356 MS
QTC CALCULATION(BAZETT): 496 MS
QTC FREDERICIA: 445 MS
R AXIS: -81 DEGREES
T AXIS: 77 DEGREES
T OFFSET: 407 MS
VENTRICULAR RATE: 117 BPM

## 2024-06-18 ENCOUNTER — APPOINTMENT (OUTPATIENT)
Dept: SURGERY | Facility: CLINIC | Age: 64
End: 2024-06-18
Payer: COMMERCIAL

## 2024-06-21 LAB
LABORATORY COMMENT REPORT: NORMAL
PATH REPORT.FINAL DX SPEC: NORMAL
PATH REPORT.GROSS SPEC: NORMAL
PATH REPORT.RELEVANT HX SPEC: NORMAL
PATH REPORT.TOTAL CANCER: NORMAL

## 2024-06-24 ENCOUNTER — TELEPHONE (OUTPATIENT)
Dept: GASTROENTEROLOGY | Facility: CLINIC | Age: 64
End: 2024-06-24
Payer: COMMERCIAL

## 2025-03-05 ENCOUNTER — APPOINTMENT (OUTPATIENT)
Dept: CARDIOLOGY | Facility: CLINIC | Age: 65
End: 2025-03-05
Payer: COMMERCIAL

## 2025-03-12 ENCOUNTER — APPOINTMENT (OUTPATIENT)
Dept: CARDIOLOGY | Facility: CLINIC | Age: 65
End: 2025-03-12
Payer: COMMERCIAL

## 2025-05-07 ENCOUNTER — APPOINTMENT (OUTPATIENT)
Dept: CARDIOLOGY | Facility: CLINIC | Age: 65
End: 2025-05-07
Payer: COMMERCIAL

## (undated) DEVICE — SHEATH, GLIDESHEATH, SLENDER, 6FR 10CM

## (undated) DEVICE — CLOSURE SYSTEM, VASCULAR, VASCADE, 5 F

## (undated) DEVICE — CATHETER, DIAGNOSTIC, 5FR,  PIG-145, 110CM, 6SH ANGLED

## (undated) DEVICE — Device

## (undated) DEVICE — SHEATH, PINNACLE, W/.038 GUIDEWIRE, 10 CM,  6FR INTRODUCER, 6FR DIA, 2.5 CM DIALATOR

## (undated) DEVICE — CATHETER, BALLOON DILATION, EUPHORA SEMICOMPLIANT 3.00  X 15 MM X 142CM

## (undated) DEVICE — ACCESS KIT, S-MAK MINI, 5FR 10CM 0.018IN 40CM, SS/SS, ECHO ENHANCE NEEDLE

## (undated) DEVICE — CATHETER, BALLOON DILATION, EUPHORA SEMICOMPLIANT 2.0  X 20 MM X 142CM

## (undated) DEVICE — SYRINGE, ANGIOGRAPHIC, MULTIDOSE

## (undated) DEVICE — CATHETER, BALLOON DILATION, EUPHORA SEMICOMPLIANT 2.0  X 15 MM X 142CM

## (undated) DEVICE — TUBING, MANIFOLD, LOW PRESSURE

## (undated) DEVICE — CATHETER, DIAGNOSTIC, JUDKINS, LEFT, 5 FR-JL 4.0

## (undated) DEVICE — CATHETER, INFINITI DIAGNOSTIC, 5 FR 100CM 3DRC, WILLIAMS RIGHT OR NO TORQUE

## (undated) DEVICE — BANDAGE, QUIKCLOT, INTERVENTIONAL HEMO, W/O SLIT

## (undated) DEVICE — CATHETER, BALLOON, NC EUPHORA NONCOMPLIANT 3.0 X 15 X 142CM

## (undated) DEVICE — GUIDEWIRE, UNIVERSAL BALANCE MID WEIGHT, 190CM, STR

## (undated) DEVICE — CATHETER, GUIDING, VISTA BRITE 6FR, XB 3.5 100CM

## (undated) DEVICE — INFLATION DEVICE, COPILOT VALVE AND 20/30 INDEFLATOR

## (undated) DEVICE — GUIDEWIRE, UNIVERSAL BALANCE MID WEIGHT, 190CM, J-TIP

## (undated) DEVICE — CATHETER, VISTA BRIGHT TIP 6FR 100CM, JR 4

## (undated) DEVICE — SHEATH, PINNACLE, W/.038 GW 10CM, 5FR INTRODUCER, 2.5 CM DIALATOR

## (undated) DEVICE — CLOSURE SYSTEM, VASCULAR, VASCADE 6/7F VCS

## (undated) DEVICE — CATHETER, EAGLE EYE, PLATNIUM (IVUS)